# Patient Record
Sex: MALE | Race: WHITE | NOT HISPANIC OR LATINO | Employment: OTHER | URBAN - METROPOLITAN AREA
[De-identification: names, ages, dates, MRNs, and addresses within clinical notes are randomized per-mention and may not be internally consistent; named-entity substitution may affect disease eponyms.]

---

## 2017-01-04 ENCOUNTER — TRANSCRIBE ORDERS (OUTPATIENT)
Dept: ADMINISTRATIVE | Facility: HOSPITAL | Age: 72
End: 2017-01-04

## 2017-01-04 DIAGNOSIS — R91.8 PULMONARY INFILTRATE: Primary | ICD-10-CM

## 2017-01-06 ENCOUNTER — OFFICE VISIT (OUTPATIENT)
Dept: ORTHOPEDIC SURGERY | Facility: CLINIC | Age: 72
End: 2017-01-06

## 2017-01-06 VITALS — BODY MASS INDEX: 30.96 KG/M2 | WEIGHT: 209 LBS | HEIGHT: 69 IN | TEMPERATURE: 98.2 F

## 2017-01-06 DIAGNOSIS — G89.29 CHRONIC RIGHT SHOULDER PAIN: Primary | ICD-10-CM

## 2017-01-06 DIAGNOSIS — M25.511 CHRONIC RIGHT SHOULDER PAIN: Primary | ICD-10-CM

## 2017-01-06 PROCEDURE — 73030 X-RAY EXAM OF SHOULDER: CPT | Performed by: ORTHOPAEDIC SURGERY

## 2017-01-06 PROCEDURE — 73010 X-RAY EXAM OF SHOULDER BLADE: CPT | Performed by: ORTHOPAEDIC SURGERY

## 2017-01-06 PROCEDURE — 20610 DRAIN/INJ JOINT/BURSA W/O US: CPT | Performed by: ORTHOPAEDIC SURGERY

## 2017-01-06 PROCEDURE — 99204 OFFICE O/P NEW MOD 45 MIN: CPT | Performed by: ORTHOPAEDIC SURGERY

## 2017-01-06 RX ORDER — PANTOPRAZOLE SODIUM 40 MG/1
40 GRANULE, DELAYED RELEASE ORAL DAILY
COMMUNITY
End: 2019-08-05

## 2017-01-06 RX ORDER — QUETIAPINE FUMARATE 50 MG/1
50 TABLET, FILM COATED ORAL NIGHTLY
COMMUNITY
End: 2019-11-05 | Stop reason: SDUPTHER

## 2017-01-06 RX ORDER — MELOXICAM 15 MG/1
15 TABLET ORAL DAILY
Status: ON HOLD | COMMUNITY
End: 2019-04-17

## 2017-01-06 RX ADMIN — METHYLPREDNISOLONE ACETATE 160 MG: 80 INJECTION, SUSPENSION INTRA-ARTICULAR; INTRALESIONAL; INTRAMUSCULAR; SOFT TISSUE at 13:04

## 2017-01-06 RX ADMIN — LIDOCAINE HYDROCHLORIDE 2 ML: 10 INJECTION, SOLUTION INFILTRATION; PERINEURAL at 13:04

## 2017-01-06 RX ADMIN — BUPIVACAINE HYDROCHLORIDE 2 ML: 5 INJECTION, SOLUTION PERINEURAL at 13:04

## 2017-01-06 NOTE — MR AVS SNAPSHOT
Godfrey Shah   1/6/2017 8:45 AM   Office Visit    Dept Phone:  [phone]   Encounter #:  [id number]    Provider:  Cipriano Meng MD   Department:  [facility]                Your Full Care Plan              Your Updated Medication List          This list is accurate as of: 1/6/17 10:18 AM.  Always use your most recent med list.                cetirizine 10 MG tablet   Commonly known as:  zyrTEC       meloxicam 15 MG tablet   Commonly known as:  MOBIC       mirtazapine 7.5 MG tablet   Commonly known as:  REMERON       PROTONIX 40 MG pack packet   Generic drug:  pantoprazole       QUEtiapine 50 MG tablet   Commonly known as:  SEROquel               We Performed the Following     XR Scapula Right     XR Shoulder 2+ View Right       You Were Diagnosed With        Codes Comments    Chronic right shoulder pain    -  Primary ICD-10-CM: M25.511, G89.29  ICD-9-CM: 719.41, 338.29       Instructions     None    Patient Instructions History      Upcoming Appointments     Visit Type Date Time Department    NEW PATIENT 1/6/2017  8:45 AM [facility]    CT Y[facility]ZA CHEST W CONTRAST 4/4/2017  7:00 AM  [facility] CT      MyChart Signup     Our records indicate that your [facility] [employer] account has been deactivated. If you would like to reactivate your account, please email [email or web address] or call [phone] to talk to our [employer] staff.             Other Info from Your Visit           Your Appointments     Apr 04, 2017  7:00 AM EDT   CT yaritza chest w contrast with YARITZA CT 3   [facility]    [address] [facility] [address] [phone]           Bring current list of meds Please arrive 15 minutes prior to exam              Allergies     No Known Allergies      Reason for Visit     Right Shoulder - Establish Care           Vital Signs     Temperature Height Weight Body Mass Index Smoking Status    "   98.2 °F (36.8 °C) (Temporal Artery ) 68.5\" (174 cm) 209 lb (94.8 kg) 31.32 kg/m2 Former Smoker       Problems and Diagnoses Noted     Chronic right shoulder pain    -  Primary        "

## 2017-01-07 RX ORDER — METHYLPREDNISOLONE ACETATE 80 MG/ML
160 INJECTION, SUSPENSION INTRA-ARTICULAR; INTRALESIONAL; INTRAMUSCULAR; SOFT TISSUE
Status: COMPLETED | OUTPATIENT
Start: 2017-01-06 | End: 2017-01-06

## 2017-01-07 RX ORDER — BUPIVACAINE HYDROCHLORIDE 5 MG/ML
2 INJECTION, SOLUTION PERINEURAL
Status: COMPLETED | OUTPATIENT
Start: 2017-01-06 | End: 2017-01-06

## 2017-01-07 RX ORDER — LIDOCAINE HYDROCHLORIDE 10 MG/ML
2 INJECTION, SOLUTION INFILTRATION; PERINEURAL
Status: COMPLETED | OUTPATIENT
Start: 2017-01-06 | End: 2017-01-06

## 2017-01-07 NOTE — PROGRESS NOTES
"  Patient: Godfrey Shah    YOB: 1945    Medical Record Number: [id number]    Chief Complaints:   Right shoulder pain    History of Present Illness:     71 y.o. male patient who presents with right shoulder pain and weakness.  He tells me that this has been and off and on issue for approximately 1 year. It has gotten especially bad over the past 2 months.  He cannot recall any specific inciting event or factor.  Pain is mostly lateral.  He does experience a lot of night pain.  He has been taking Mobic and it seems to help somewhat.  He describes the pain as varying in severity from mild to severe depending upon his activity level.  Pain is constant and aching.  He is right-hand dominant and this is been a hardship for him.  He rates his current pain as 5 out of 10 in severity. Denies any shooting pain down the arm, distal weakness, numbness, or paresthesias.    Allergies: No Known Allergies    Home Medications:    Current Outpatient Prescriptions:   •  meloxicam (MOBIC) 15 MG tablet, Take 15 mg by mouth Daily., Disp: , Rfl:   •  QUEtiapine (SEROquel) 50 MG tablet, Take 50 mg by mouth Every Night., Disp: , Rfl:   •  cetirizine (ZyrTEC) 10 MG tablet, Take 10 mg by mouth daily., Disp: , Rfl:   •  mirtazapine (REMERON) 7.5 MG tablet, Take 7.5 mg by mouth every night., Disp: , Rfl:   •  pantoprazole (PROTONIX) 40 MG pack packet, Take  by mouth., Disp: , Rfl:     Past Medical History   Diagnosis Date   • Anxiety    • GERD (gastroesophageal reflux disease)        Past Surgical History   Procedure Laterality Date   • Penis surgery       Penile implant       Social History     Occupational History   • Not on file.     Social History Main Topics   • Smoking status: Former Smoker   • Smokeless tobacco: Not on file      Comment: Quit 2000   • Alcohol use Yes      Comment: \"I was drinking about a bottle a day for the last month\".    • Drug use: Defer   • Sexual activity: Defer      Social History     Social " "History Narrative       No family history on file.    Review of Systems:      Constitutional: Denies fever, shaking or chills   Eyes: Denies change in visual acuity   HEENT: Denies nasal congestion or sore throat   Respiratory: Denies cough or shortness of breath   Cardiovascular: Denies chest pain or edema  Endocrine: Denies tremors, palpitations, intolerance of heat or cold, polyuria, polydipsia.  GI: Denies abdominal pain, nausea, vomiting, bloody stools or diarrhea  : Denies frequency, urgency, incontinence, retention, or nocturia.  Musculoskeletal: Denies numbness tingling or loss of motor function except as above  Integument: Denies rash, lesion or ulceration   Neurologic: Denies headache or focal weakness, deficits  Heme: Denies epistaxis, spontaneous or excessive bleeding, epistaxis, hematuria, melena, fatigue, enlarged or tender lymph nodes.      All other pertinent positives and negatives as noted above in HPI.    Physical Exam: 71 y.o. male  Vitals:    01/06/17 0948   Temp: 98.2 °F (36.8 °C)   TempSrc: Temporal Artery    Weight: 209 lb (94.8 kg)   Height: 68.5\" (174 cm)       General:  Patient is awake and alert.  Appears in no acute distress or discomfort.    Psych:  Affect and demeanor are appropriate.    Eyes:  Conjunctiva and sclera appear grossly normal.  Eyes track well and EOM seem to be intact.    Ears:  No gross abnormalities.  Hearing adequate for the exam.    Cardiovascular:  Regular rate and rhythm.    Lungs:  Good chest expansion.  Breathing unlabored.    Lymph:  No palpable adenopathy about neck or axilla.    Neck:  Supple.  Normal ROM.  Negative Spurling's for shoulder or arm pain.    Right upper extremity:  Skin benign and intact without evidence for swelling, masses or atrophy.  No palpable masses.  No focal areas of exquisite tenderness.  Full active ROM.  No evident instability or apprehension.  Positive Neer and Rowan impingement maneuvers.  Negative Speeds, Yergason's and active " compression maneuvers.  Pain and weakness with resistive testing of elevation in scapular plane and external rotation.  Negative Hornblower's and ER lag sign.  Good strength in wrist and hand.  Intact sensation in arm, hand.  Palpable radial pulse with brisk cap refill.         Radiology:   AP, scapular Y, and axillary views of the right shoulder are ordered by myself and reviewed to evaluate the patient's complaint.  No comparison films are immediately available.  The x-rays show no obvious acute abnormalities, lesions, masses, or other concerning findings.  Type II acromion.  Moderate acromioclavicular osteoarthritis.The acromiohumeral interval is normal.  Glenoid version appears normal as well.    Assessment/Plan:   Right rotator cuff tear versus tendinopathy    Discussed options in detail including conservative versus surgical options. I have recommended that we start with a conservative approach. With regards to conservative options, we discussed appropriate activity modifications, icing as needed, anti-inflammatories, physical therapy, and injections.  Patient has acknowledged understanding of this information and stated that he would like to try an injection.  The risk benefits and alternatives were thoroughly discussed.  He consented to proceed as described below.  Going forward, I will release him to follow-up as needed.  If his pain persists and/or recurs, I told him I'll be happy to see him back at any point.    Large Joint Arthrocentesis  Date/Time: 1/6/2017 1:04 PM  Consent given by: patient  Site marked: site marked  Timeout: Immediately prior to procedure a time out was called to verify the correct patient, procedure, equipment, support staff and site/side marked as required   Supporting Documentation  Indications: pain and joint swelling   Procedure Details  Location: shoulder - R subacromial bursa  Preparation: Patient was prepped and draped in the usual sterile fashion  Needle size: 25 G  Approach:  posterior  Medications administered: 2 mL lidocaine 1 %; 160 mg methylPREDNISolone acetate 80 MG/ML; 2 mL bupivacaine 0.5 %  Patient tolerance: patient tolerated the procedure well with no immediate complications            Cirpiano Meng MD    01/06/2017    CC to Godfrey Jackson MD

## 2017-01-27 ENCOUNTER — TELEPHONE (OUTPATIENT)
Dept: ORTHOPEDIC SURGERY | Facility: CLINIC | Age: 72
End: 2017-01-27

## 2017-01-27 DIAGNOSIS — G89.29 CHRONIC RIGHT SHOULDER PAIN: Primary | ICD-10-CM

## 2017-01-27 DIAGNOSIS — M25.511 CHRONIC RIGHT SHOULDER PAIN: Primary | ICD-10-CM

## 2017-04-04 ENCOUNTER — HOSPITAL ENCOUNTER (OUTPATIENT)
Dept: CT IMAGING | Facility: HOSPITAL | Age: 72
Discharge: HOME | End: 2017-04-04
Attending: INTERNAL MEDICINE | Admitting: INTERNAL MEDICINE

## 2017-04-04 DIAGNOSIS — R91.8 PULMONARY INFILTRATE: ICD-10-CM

## 2017-04-04 LAB — CREAT BLDA-MCNC: 0.9 MG/DL (ref 0.6–1.3)

## 2017-04-04 PROCEDURE — 0 IOPAMIDOL 61 % SOLUTION: Performed by: INTERNAL MEDICINE

## 2017-04-04 PROCEDURE — 82565 ASSAY OF CREATININE: CPT

## 2017-04-04 PROCEDURE — 71260 CT THORAX DX C+: CPT

## 2017-04-04 RX ADMIN — IOPAMIDOL 75 ML: 612 INJECTION, SOLUTION INTRAVENOUS at 06:50

## 2017-04-13 ENCOUNTER — TRANSCRIBE ORDERS (OUTPATIENT)
Dept: ADMINISTRATIVE | Facility: HOSPITAL | Age: 72
End: 2017-04-13

## 2017-04-13 DIAGNOSIS — R93.89 INFILTRATE NOTED ON IMAGING STUDY: Primary | ICD-10-CM

## 2017-04-21 ENCOUNTER — TRANSCRIBE ORDERS (OUTPATIENT)
Dept: ADMINISTRATIVE | Facility: HOSPITAL | Age: 72
End: 2017-04-21

## 2017-04-21 DIAGNOSIS — R22.31 MASS OF UPPER EXTREMITY, RIGHT: Primary | ICD-10-CM

## 2017-05-01 ENCOUNTER — HOSPITAL ENCOUNTER (OUTPATIENT)
Dept: MRI IMAGING | Facility: HOSPITAL | Age: 72
Discharge: HOME | End: 2017-05-01
Admitting: FAMILY MEDICINE

## 2017-05-01 DIAGNOSIS — R22.31 MASS OF UPPER EXTREMITY, RIGHT: ICD-10-CM

## 2017-05-01 PROCEDURE — 73219 MRI UPPER EXTREMITY W/DYE: CPT

## 2017-05-01 PROCEDURE — A9577 INJ MULTIHANCE: HCPCS | Performed by: FAMILY MEDICINE

## 2017-05-01 PROCEDURE — 0 GADOBENATE DIMEGLUMINE 529 MG/ML SOLUTION: Performed by: FAMILY MEDICINE

## 2017-05-01 RX ADMIN — GADOBENATE DIMEGLUMINE 20 ML: 529 INJECTION, SOLUTION INTRAVENOUS at 15:15

## 2017-05-19 ENCOUNTER — OFFICE VISIT (OUTPATIENT)
Dept: ORTHOPEDIC SURGERY | Facility: CLINIC | Age: 72
End: 2017-05-19

## 2017-05-19 DIAGNOSIS — G89.29 CHRONIC RIGHT SHOULDER PAIN: Primary | ICD-10-CM

## 2017-05-19 DIAGNOSIS — M25.511 CHRONIC RIGHT SHOULDER PAIN: Primary | ICD-10-CM

## 2017-05-19 PROCEDURE — 99212 OFFICE O/P EST SF 10 MIN: CPT | Performed by: ORTHOPAEDIC SURGERY

## 2017-05-19 RX ORDER — ALBUTEROL SULFATE 90 UG/1
AEROSOL, METERED RESPIRATORY (INHALATION)
Refills: 3 | COMMUNITY
Start: 2017-04-12 | End: 2022-09-15 | Stop reason: SDUPTHER

## 2017-10-04 ENCOUNTER — HOSPITAL ENCOUNTER (OUTPATIENT)
Dept: CT IMAGING | Facility: HOSPITAL | Age: 72
Discharge: HOME | End: 2017-10-04
Attending: INTERNAL MEDICINE | Admitting: INTERNAL MEDICINE

## 2017-10-04 DIAGNOSIS — R93.89 INFILTRATE NOTED ON IMAGING STUDY: ICD-10-CM

## 2017-10-04 PROCEDURE — 71250 CT THORAX DX C-: CPT

## 2017-10-12 ENCOUNTER — TRANSCRIBE ORDERS (OUTPATIENT)
Dept: ADMINISTRATIVE | Facility: HOSPITAL | Age: 72
End: 2017-10-12

## 2017-10-12 DIAGNOSIS — R91.8 PULMONARY INFILTRATE: Primary | ICD-10-CM

## 2018-02-27 ENCOUNTER — TRANSCRIBE ORDERS (OUTPATIENT)
Dept: ADMINISTRATIVE | Facility: HOSPITAL | Age: 73
End: 2018-02-27

## 2018-02-27 DIAGNOSIS — N28.89 KIDNEY MASS: Primary | ICD-10-CM

## 2018-06-12 ENCOUNTER — HOSPITAL ENCOUNTER (OUTPATIENT)
Dept: CT IMAGING | Facility: HOSPITAL | Age: 73
Discharge: HOME | End: 2018-06-12
Attending: UROLOGY | Admitting: UROLOGY

## 2018-06-12 DIAGNOSIS — N28.89 KIDNEY MASS: ICD-10-CM

## 2018-06-12 LAB — CREAT BLDA-MCNC: 1 MG/DL (ref 0.6–1.3)

## 2018-06-12 PROCEDURE — 74178 CT ABD&PLV WO CNTR FLWD CNTR: CPT

## 2018-06-12 PROCEDURE — 84154 ASSAY OF PSA FREE: CPT | Performed by: UROLOGY

## 2018-06-12 PROCEDURE — 82565 ASSAY OF CREATININE: CPT

## 2018-06-12 PROCEDURE — 25010000002 IOPAMIDOL 61 % SOLUTION: Performed by: UROLOGY

## 2018-06-12 PROCEDURE — 84153 ASSAY OF PSA TOTAL: CPT | Performed by: UROLOGY

## 2018-06-12 RX ADMIN — IOPAMIDOL 85 ML: 612 INJECTION, SOLUTION INTRAVENOUS at 08:35

## 2018-06-13 LAB
PSA FREE MFR SERPL: 8.3 %
PSA FREE SERPL-MCNC: 0.1 NG/ML
PSA SERPL-MCNC: 1.2 NG/ML (ref 0–4)

## 2018-10-10 ENCOUNTER — HOSPITAL ENCOUNTER (OUTPATIENT)
Dept: CT IMAGING | Facility: HOSPITAL | Age: 73
Discharge: HOME | End: 2018-10-10
Attending: INTERNAL MEDICINE | Admitting: INTERNAL MEDICINE

## 2018-10-10 DIAGNOSIS — R91.8 PULMONARY INFILTRATE: ICD-10-CM

## 2018-10-10 PROCEDURE — 71250 CT THORAX DX C-: CPT

## 2019-04-15 ENCOUNTER — HOSPITAL ENCOUNTER (INPATIENT)
Facility: HOSPITAL | Age: 74
LOS: 2 days | Discharge: HOME | End: 2019-04-20
Attending: INTERNAL MEDICINE | Admitting: INTERNAL MEDICINE

## 2019-04-15 DIAGNOSIS — R00.1 SINUS BRADYCARDIA: ICD-10-CM

## 2019-04-15 DIAGNOSIS — R00.1 BRADYCARDIA, SINUS: ICD-10-CM

## 2019-04-15 DIAGNOSIS — I44.30 AV BLOCK: Primary | ICD-10-CM

## 2019-04-15 DIAGNOSIS — G47.33 OSA (OBSTRUCTIVE SLEEP APNEA): ICD-10-CM

## 2019-04-15 DIAGNOSIS — R00.1 SYMPTOMATIC BRADYCARDIA: ICD-10-CM

## 2019-04-15 PROBLEM — G47.30 SLEEP-DISORDERED BREATHING: Status: ACTIVE | Noted: 2019-04-15

## 2019-04-15 LAB
ALBUMIN SERPL-MCNC: 3.8 G/DL (ref 3.5–5.2)
ALBUMIN/GLOB SERPL: 1.4 G/DL
ALP SERPL-CCNC: 83 U/L (ref 39–117)
ALT SERPL W P-5'-P-CCNC: 11 U/L (ref 1–41)
ANION GAP SERPL CALCULATED.3IONS-SCNC: 12.4 MMOL/L
AST SERPL-CCNC: 15 U/L (ref 1–40)
BILIRUB SERPL-MCNC: 1 MG/DL (ref 0.2–1.2)
BUN BLD-MCNC: 11 MG/DL (ref 8–23)
BUN/CREAT SERPL: 12.1 (ref 7–25)
CALCIUM SPEC-SCNC: 8.5 MG/DL (ref 8.6–10.5)
CHLORIDE SERPL-SCNC: 105 MMOL/L (ref 98–107)
CK SERPL-CCNC: 77 U/L (ref 20–200)
CO2 SERPL-SCNC: 25.6 MMOL/L (ref 22–29)
CREAT BLD-MCNC: 0.91 MG/DL (ref 0.76–1.27)
GFR SERPL CREATININE-BSD FRML MDRD: 82 ML/MIN/1.73
GLOBULIN UR ELPH-MCNC: 2.8 GM/DL
GLUCOSE BLD-MCNC: 97 MG/DL (ref 65–99)
MAGNESIUM SERPL-MCNC: 2 MG/DL (ref 1.6–2.4)
POTASSIUM BLD-SCNC: 3.8 MMOL/L (ref 3.5–5.2)
PROT SERPL-MCNC: 6.6 G/DL (ref 6–8.5)
SODIUM BLD-SCNC: 143 MMOL/L (ref 136–145)
TROPONIN T SERPL-MCNC: <0.01 NG/ML (ref 0–0.03)

## 2019-04-15 PROCEDURE — 83735 ASSAY OF MAGNESIUM: CPT | Performed by: INTERNAL MEDICINE

## 2019-04-15 PROCEDURE — 85027 COMPLETE CBC AUTOMATED: CPT | Performed by: INTERNAL MEDICINE

## 2019-04-15 PROCEDURE — 85610 PROTHROMBIN TIME: CPT | Performed by: INTERNAL MEDICINE

## 2019-04-15 PROCEDURE — 84443 ASSAY THYROID STIM HORMONE: CPT | Performed by: INTERNAL MEDICINE

## 2019-04-15 PROCEDURE — 83036 HEMOGLOBIN GLYCOSYLATED A1C: CPT | Performed by: INTERNAL MEDICINE

## 2019-04-15 PROCEDURE — G0378 HOSPITAL OBSERVATION PER HR: HCPCS

## 2019-04-15 PROCEDURE — 85730 THROMBOPLASTIN TIME PARTIAL: CPT | Performed by: INTERNAL MEDICINE

## 2019-04-15 PROCEDURE — 84484 ASSAY OF TROPONIN QUANT: CPT | Performed by: INTERNAL MEDICINE

## 2019-04-15 PROCEDURE — 93010 ELECTROCARDIOGRAM REPORT: CPT | Performed by: INTERNAL MEDICINE

## 2019-04-15 PROCEDURE — 82550 ASSAY OF CK (CPK): CPT | Performed by: INTERNAL MEDICINE

## 2019-04-15 PROCEDURE — 93005 ELECTROCARDIOGRAM TRACING: CPT | Performed by: INTERNAL MEDICINE

## 2019-04-15 PROCEDURE — 80053 COMPREHEN METABOLIC PANEL: CPT | Performed by: INTERNAL MEDICINE

## 2019-04-15 RX ORDER — CETIRIZINE HYDROCHLORIDE 10 MG/1
10 TABLET ORAL DAILY
Status: DISCONTINUED | OUTPATIENT
Start: 2019-04-16 | End: 2019-04-20 | Stop reason: HOSPADM

## 2019-04-15 RX ORDER — NITROGLYCERIN 0.4 MG/1
0.4 TABLET SUBLINGUAL
Status: DISCONTINUED | OUTPATIENT
Start: 2019-04-15 | End: 2019-04-20 | Stop reason: HOSPADM

## 2019-04-15 RX ORDER — ONDANSETRON 2 MG/ML
4 INJECTION INTRAMUSCULAR; INTRAVENOUS EVERY 6 HOURS PRN
Status: DISCONTINUED | OUTPATIENT
Start: 2019-04-15 | End: 2019-04-20 | Stop reason: HOSPADM

## 2019-04-15 RX ORDER — LOSARTAN POTASSIUM 25 MG/1
25 TABLET ORAL DAILY
COMMUNITY
End: 2019-08-06 | Stop reason: SDUPTHER

## 2019-04-15 RX ORDER — QUETIAPINE FUMARATE 50 MG/1
50 TABLET, FILM COATED ORAL NIGHTLY
Status: DISCONTINUED | OUTPATIENT
Start: 2019-04-15 | End: 2019-04-20 | Stop reason: HOSPADM

## 2019-04-15 RX ORDER — ACETAMINOPHEN 325 MG/1
650 TABLET ORAL EVERY 6 HOURS PRN
Status: DISCONTINUED | OUTPATIENT
Start: 2019-04-15 | End: 2019-04-20 | Stop reason: HOSPADM

## 2019-04-15 RX ORDER — PANTOPRAZOLE SODIUM 40 MG/1
40 TABLET, DELAYED RELEASE ORAL
Status: DISCONTINUED | OUTPATIENT
Start: 2019-04-16 | End: 2019-04-17 | Stop reason: SDUPTHER

## 2019-04-15 RX ADMIN — QUETIAPINE FUMARATE 25 MG: 50 TABLET, FILM COATED ORAL at 23:46

## 2019-04-16 ENCOUNTER — APPOINTMENT (OUTPATIENT)
Dept: GENERAL RADIOLOGY | Facility: HOSPITAL | Age: 74
End: 2019-04-16

## 2019-04-16 ENCOUNTER — APPOINTMENT (OUTPATIENT)
Dept: CARDIOLOGY | Facility: HOSPITAL | Age: 74
End: 2019-04-16

## 2019-04-16 PROBLEM — R00.1 SYMPTOMATIC BRADYCARDIA: Status: ACTIVE | Noted: 2019-04-16

## 2019-04-16 LAB
ANION GAP SERPL CALCULATED.3IONS-SCNC: 13.8 MMOL/L
APTT PPP: 29.8 SECONDS (ref 22.7–35.4)
BH CV STRESS BP STAGE 1: NORMAL
BH CV STRESS BP STAGE 2: NORMAL
BH CV STRESS BP STAGE 3: NORMAL
BH CV STRESS DURATION MIN STAGE 1: 3
BH CV STRESS DURATION MIN STAGE 2: 3
BH CV STRESS DURATION MIN STAGE 3: 0
BH CV STRESS DURATION SEC STAGE 1: 0
BH CV STRESS DURATION SEC STAGE 2: 0
BH CV STRESS DURATION SEC STAGE 3: 5
BH CV STRESS GRADE STAGE 1: 10
BH CV STRESS GRADE STAGE 2: 12
BH CV STRESS GRADE STAGE 3: 14
BH CV STRESS HR STAGE 1: 101
BH CV STRESS HR STAGE 2: 124
BH CV STRESS HR STAGE 3: 125
BH CV STRESS METS STAGE 1: 5
BH CV STRESS METS STAGE 2: 7.5
BH CV STRESS METS STAGE 3: 10
BH CV STRESS PROTOCOL 1: NORMAL
BH CV STRESS RECOVERY BP: NORMAL MMHG
BH CV STRESS RECOVERY HR: 74 BPM
BH CV STRESS SPEED STAGE 1: 1.7
BH CV STRESS SPEED STAGE 2: 2.5
BH CV STRESS SPEED STAGE 3: 3.4
BH CV STRESS STAGE 1: 1
BH CV STRESS STAGE 2: 2
BH CV STRESS STAGE 3: 3
BUN BLD-MCNC: 12 MG/DL (ref 8–23)
BUN/CREAT SERPL: 12.2 (ref 7–25)
CALCIUM SPEC-SCNC: 8.6 MG/DL (ref 8.6–10.5)
CHLORIDE SERPL-SCNC: 104 MMOL/L (ref 98–107)
CHOLEST SERPL-MCNC: 154 MG/DL (ref 0–200)
CK SERPL-CCNC: 65 U/L (ref 20–200)
CO2 SERPL-SCNC: 24.2 MMOL/L (ref 22–29)
CREAT BLD-MCNC: 0.98 MG/DL (ref 0.76–1.27)
DEPRECATED RDW RBC AUTO: 43.9 FL (ref 37–54)
ERYTHROCYTE [DISTWIDTH] IN BLOOD BY AUTOMATED COUNT: 12.2 % (ref 12.3–15.4)
GFR SERPL CREATININE-BSD FRML MDRD: 75 ML/MIN/1.73
GLUCOSE BLD-MCNC: 104 MG/DL (ref 65–99)
HBA1C MFR BLD: 5.64 % (ref 4.8–5.6)
HCT VFR BLD AUTO: 46.6 % (ref 37.5–51)
HDLC SERPL-MCNC: 30 MG/DL (ref 40–60)
HGB BLD-MCNC: 15.4 G/DL (ref 13–17.7)
INR PPP: 1.04 (ref 0.9–1.1)
LDLC SERPL CALC-MCNC: 94 MG/DL (ref 0–100)
LDLC/HDLC SERPL: 3.15 {RATIO}
MAXIMAL PREDICTED HEART RATE: 147 BPM
MCH RBC QN AUTO: 32.2 PG (ref 26.6–33)
MCHC RBC AUTO-ENTMCNC: 33 G/DL (ref 31.5–35.7)
MCV RBC AUTO: 97.5 FL (ref 79–97)
PERCENT MAX PREDICTED HR: 86.39 %
PLATELET # BLD AUTO: 142 10*3/MM3 (ref 140–450)
PMV BLD AUTO: 10.5 FL (ref 6–12)
POTASSIUM BLD-SCNC: 3.8 MMOL/L (ref 3.5–5.2)
PROTHROMBIN TIME: 13.3 SECONDS (ref 11.7–14.2)
RBC # BLD AUTO: 4.78 10*6/MM3 (ref 4.14–5.8)
SODIUM BLD-SCNC: 142 MMOL/L (ref 136–145)
STRESS BASELINE BP: NORMAL MMHG
STRESS BASELINE HR: 63 BPM
STRESS PERCENT HR: 102 %
STRESS POST ESTIMATED WORKLOAD: 7.2 METS
STRESS POST EXERCISE DUR MIN: 6 MIN
STRESS POST EXERCISE DUR SEC: 5 SEC
STRESS POST PEAK BP: NORMAL MMHG
STRESS POST PEAK HR: 127 BPM
STRESS TARGET HR: 125 BPM
TRIGL SERPL-MCNC: 148 MG/DL (ref 0–150)
TROPONIN T SERPL-MCNC: <0.01 NG/ML (ref 0–0.03)
TSH SERPL DL<=0.05 MIU/L-ACNC: 1.03 MIU/ML (ref 0.27–4.2)
VLDLC SERPL-MCNC: 29.6 MG/DL (ref 5–40)
WBC NRBC COR # BLD: 5.24 10*3/MM3 (ref 3.4–10.8)

## 2019-04-16 PROCEDURE — G0378 HOSPITAL OBSERVATION PER HR: HCPCS

## 2019-04-16 PROCEDURE — 94640 AIRWAY INHALATION TREATMENT: CPT

## 2019-04-16 PROCEDURE — 94762 N-INVAS EAR/PLS OXIMTRY CONT: CPT

## 2019-04-16 PROCEDURE — 84484 ASSAY OF TROPONIN QUANT: CPT | Performed by: INTERNAL MEDICINE

## 2019-04-16 PROCEDURE — 94799 UNLISTED PULMONARY SVC/PX: CPT

## 2019-04-16 PROCEDURE — 80061 LIPID PANEL: CPT | Performed by: INTERNAL MEDICINE

## 2019-04-16 PROCEDURE — 71046 X-RAY EXAM CHEST 2 VIEWS: CPT

## 2019-04-16 PROCEDURE — 93010 ELECTROCARDIOGRAM REPORT: CPT | Performed by: INTERNAL MEDICINE

## 2019-04-16 PROCEDURE — 93017 CV STRESS TEST TRACING ONLY: CPT

## 2019-04-16 PROCEDURE — 93005 ELECTROCARDIOGRAM TRACING: CPT | Performed by: INTERNAL MEDICINE

## 2019-04-16 PROCEDURE — 82550 ASSAY OF CK (CPK): CPT | Performed by: INTERNAL MEDICINE

## 2019-04-16 PROCEDURE — 80048 BASIC METABOLIC PNL TOTAL CA: CPT | Performed by: INTERNAL MEDICINE

## 2019-04-16 RX ORDER — IPRATROPIUM BROMIDE AND ALBUTEROL SULFATE 2.5; .5 MG/3ML; MG/3ML
3 SOLUTION RESPIRATORY (INHALATION) EVERY 4 HOURS PRN
Status: DISCONTINUED | OUTPATIENT
Start: 2019-04-16 | End: 2019-04-20 | Stop reason: HOSPADM

## 2019-04-16 RX ORDER — ALPRAZOLAM 0.25 MG/1
0.25 TABLET ORAL 2 TIMES DAILY PRN
Status: DISCONTINUED | OUTPATIENT
Start: 2019-04-16 | End: 2019-04-20 | Stop reason: HOSPADM

## 2019-04-16 RX ORDER — ATROPINE SULFATE 0.4 MG/ML
0.4 AMPUL (ML) INJECTION ONCE AS NEEDED
Status: DISCONTINUED | OUTPATIENT
Start: 2019-04-16 | End: 2019-04-20 | Stop reason: HOSPADM

## 2019-04-16 RX ADMIN — PANTOPRAZOLE SODIUM 40 MG: 40 TABLET, DELAYED RELEASE ORAL at 06:10

## 2019-04-16 RX ADMIN — CETIRIZINE HYDROCHLORIDE 10 MG: 10 TABLET, FILM COATED ORAL at 11:04

## 2019-04-16 RX ADMIN — ALPRAZOLAM 0.25 MG: 0.25 TABLET ORAL at 14:02

## 2019-04-16 RX ADMIN — QUETIAPINE FUMARATE 50 MG: 50 TABLET, FILM COATED ORAL at 22:52

## 2019-04-16 RX ADMIN — IPRATROPIUM BROMIDE AND ALBUTEROL SULFATE 3 ML: 2.5; .5 SOLUTION RESPIRATORY (INHALATION) at 16:42

## 2019-04-16 NOTE — CONSULTS
Referring Provider: Dr. Jacinto  Reason for Consultation: Suspected sleep apnea    Patient Care Team:  Godfrey Jackson MD as PCP - General (Family Medicine)    Chief complaint:   Dizziness    History of present illness:    Subjective   This is a 73-year-old male patient, former smoker with a history of severe COPD/asthma (PFT: FEV1 of 48% on 10/24/18 and significant response to bronchodilators (+24%), air trapping and hyperinflation).    He was also diagnosed with obstructive sleep apnea about 15 years ago at [facility] sleep lab.  He was treated with CPAP which he used for about 6 months but then quit using it due to intolerance and problems with humidity and temperature.    It was until 2016 when he was again evaluated for sleep apnea by Dr. Naidu.  He underwent HST on 9/2/16 which showed no RYAN (DAVID = 1.4) but significant hypoxemia with duncan SPO2 84% and hypoxic burden of 55 minutes.    Despite the negative test, patient complains of loud snoring which does not awaken him but disturb his wife sometimes who actually sleeps in a different room.  He denies excessive daytime sleepiness.    He stated that since his original sleep study about 15 years ago, he has lost about 50 pounds.  He stopped drinking alcohol with his exercising regularly.  In addition, he underwent UPPP after the diagnosis of sleep apnea and his snoring has improved.    Concerning COPD/asthma, he uses Ventolin inhaler a few times a month only.  He does not have a maintenance inhaler.  He has difficulty breathing on moderate exertion but otherwise feels well and denies cough.    Patient presented to the hospital yesterday for  dizziness.  He stated that he was seen by his primary care physician a few months ago and was noted to have elevated blood pressure and was placed on antihypertensive.  Since then has been experiencing fluctuations in his blood pressure and heart rate.  We are consulted to reevaluate him for sleep disordered  "breathing due to his uncontrolled hypertension and arrhythmia.    Labs:  Hemoglobin 15.4; bicarb 25      Review of Systems  Constitutional: No fever or chills.   ENMT: No sinus congestion or postnasal drip  Cardiovascular: No chest pain, palpitation or legs swelling.    Respiratory: No significant dyspnea.  No cough or wheezing  Gastrointestinal: No constipation, diarrhea or abdominal pain   Neurology: No headache, weakness, numbness or dizziness.   Musculoskeletal: No joints pain, stiffness or swelling.   Psychiatry: No depression.  Genitourinary: No dysuria or frequent urination  Endo: No weight changes. No cold or warm intolerance.  Lymphatic: No swollen glands.  Integumentary: No rash.    History  Past Medical History:   Diagnosis Date   • Anxiety    • GERD (gastroesophageal reflux disease)    • Prostate cancer (CMS/[facility]) 05/2017   ,   Past Surgical History:   Procedure Laterality Date   • PENIS SURGERY      Penile implant   ,   Family History   Problem Relation Age of Onset   • No Known Problems Mother    • No Known Problems Father    ,   Social History     Tobacco Use   • Smoking status: Former Smoker   • Tobacco comment: Quit 2000   Substance Use Topics   • Alcohol use: Yes     Comment: \"I was drinking about a bottle a day for the last month\".    • Drug use: Defer   ,   Medications Prior to Admission   Medication Sig Dispense Refill Last Dose   • cetirizine (ZyrTEC) 10 MG tablet Take 10 mg by mouth daily.   4/15/2019 at Unknown time   • losartan (COZAAR) 25 MG tablet Take 25 mg by mouth Daily.      • pantoprazole (PROTONIX) 40 MG pack packet Take  by mouth.   4/15/2019 at Unknown time   • QUEtiapine (SEROquel) 50 MG tablet Take 50 mg by mouth Every Night.   Patient Taking Differently at Unknown time   • meloxicam (MOBIC) 15 MG tablet Take 15 mg by mouth Daily.   Taking   • mirtazapine (REMERON) 7.5 MG tablet Take 7.5 mg by mouth every night.   Taking   • VENTOLIN  (90 BASE) MCG/ACT inhaler INL 2 PFS PO " Q 4 H PRN  3 Taking   , Scheduled Meds:    cetirizine 10 mg Oral Daily   pantoprazole 40 mg Oral Q AM   QUEtiapine 50 mg Oral Nightly   , Continuous Infusions:    and Allergies:  Patient has no known allergies.    Objective     Vital Signs   Temp:  [97.7 °F (36.5 °C)] 97.7 °F (36.5 °C)  Heart Rate:  [56-59] 56  Resp:  [16] 16  BP: (152-154)/(91-98) 154/91    Physical Exam:  Constitutional: Not in acute distress.  Eyes: Injected conjunctiva, EOMI.  ENMT: Johnston 1.  Mallampati score 3.  Large tongue.. No oral thrush. Tonsils grade 0.  UPPP changes  Neck: Large. Trachea midline. No thyromegaly  Heart: RRR, no murmur  Lungs: Good and equal air entry bilaterally.  Non labored breathing.  Crackles on the left side extending to half of the chest.  Abdomen: Obese. Soft. No tenderness or dullness. No HSM.  Extremities: No cyanosis, clubbing or pitting edema: . Moves all extremities.  Neuro: Conscious, alert, oriented x3  Psych: Appropriate mood and affect.    Integumentary: No rash  Lymphatic: No palpable cervical or supraclavicular lymph nodes.      Assessment   1. COPD/asthma overlap, no exacerbation  2. Nocturnal hypoxemia, secondary #1 +/-sleep apnea  3. Snoring  4. 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 Overweight (BMI = 28)  6. Essential hypertension  7. Sinus bradycardia    Recommendations:  Despite the negative home sleep study, RYAN cannot be completely ruled out.  Would recommend outpatient evaluation with an lab polysomnography especially in the setting of severe obstructive lung disease.  Home sleep study does not entirely exclude the possibility of sleep apnea.  Patient is currently at moderate risk.    COPD is stable.  Continue as needed inhaler.    Will get an overnight oximetry to evaluate him again for nocturnal hypoxemia.  He may benefit from oxygen therapy.    Chest x-ray ordered by primary team and its pending.  Will evaluate later      Thank you for the  consultation.  We will follow along    Girma Arthur MD  04/16/19  1:32 AM

## 2019-04-16 NOTE — PLAN OF CARE
Problem: Patient Care Overview  Goal: Plan of Care Review  Outcome: Ongoing (interventions implemented as appropriate)   04/16/19 1721   Coping/Psychosocial   Plan of Care Reviewed With patient   Plan of Care Review   Progress no change   OTHER   Outcome Summary Patient underwent treadmill stress test today with episode of bradycardia/heart block. the plan is for left heart cath in the morning. short bursts of 2nd degree heart block with excersion (lowest rate-32). remains on RA. BPs stable. will continue to monitor.      Goal: Discharge Needs Assessment  Outcome: Ongoing (interventions implemented as appropriate)      Problem: Arrhythmia/Dysrhythmia (Symptomatic) (Adult)  Goal: Signs and Symptoms of Listed Potential Problems Will be Absent, Minimized or Managed (Arrhythmia/Dysrhythmia)  Outcome: Ongoing (interventions implemented as appropriate)

## 2019-04-16 NOTE — NURSING NOTE
Patient experiencing more frequent episodes of 2nd degree heart block. Patient is for the most part asymptomatic. JUAN Bond notified. To keep patient on strict bed rest. PRN order given for atropine if HR sustained <40. To call if patient becomes more symptomatic or if BP drops. Will continue to monitor.

## 2019-04-16 NOTE — H&P
"      Patient Care Team:  Godfrey Jackson MD as PCP - General (Family Medicine)    Chief complaint   malaise    Subjective     Pt presented at [facility] with Bradycardia with malaise during exercise on Fit Bit at home. Examined pt at [facility] at approx 1500, with wife present.  Pt of Dr. Jackson.  [place] like he was \"floating under\" during the exercise.  Present for approx 3 days during exercise. Stopped exercise on 4/14 due to symptoms.   No diaph or dyspnea or nausea.    Lost 30 lbs intentionally in past 3 months.     Recently started on meds for HTN.  Not sure of names.  First med = dizzy.  Second med never filled d/t cough years ago same med (must be ACEi). Third med Losartan - only took few pills - felt poorly.     Has early awakening and nightmares. Checked for RYAN by Dr. Evangelista keith - said negative. Uses sleeping pill.     No family history or tobacco.     Review of Systems   Pertinent items are noted in HPI    History    Social History     Socioeconomic History   • Marital status:      Spouse name: Not on file   • Number of children: Not on file   • Years of education: Not on file   • Highest education level: Not on file   Tobacco Use   • Smoking status: Former Smoker   • Tobacco comment: Quit 2000   Substance and Sexual Activity   • Alcohol use: Yes     Comment: \"I was drinking about a bottle a day for the last month\".    • Drug use: Defer   • Sexual activity: Defer      Family History   Problem Relation Age of Onset   • No Known Problems Mother    • No Known Problems Father         Objective     Vital Signs  Temp:  [97.7 °F (36.5 °C)] 97.7 °F (36.5 °C)  Heart Rate:  [56-59] 56  Resp:  [16] 16  BP: (152-154)/(91-98) 154/91    Physical Exam:      General Appearance:    Alert, cooperative, in no acute distress   Head:    Normocephalic, without obvious abnormality, atraumatic   Eyes:            Lids and lashes normal, conjunctivae and sclerae normal, no   icterus, no pallor, corneas clear, PERRLA   Ears:    Ears " appear intact with no abnormalities noted   Throat:   No oral lesions, no thrush, oral mucosa moist   Neck:   No adenopathy, supple, trachea midline, no thyromegaly, no   carotid bruit, no JVD   Back:     No kyphosis present, no scoliosis present, no skin lesions,      erythema or scars, no tenderness to percussion or                   palpation,   range of motion normal   Lungs:     Dullness at L base auscultation,respirations regular, even and                  unlabored    Heart:    Regular rhythm and normal rate, normal S1 and S2, no            murmur, no gallop, no rub, no click   Chest Wall:    No abnormalities observed   Abdomen:     Normal bowel sounds, no masses, no organomegaly, soft        non-tender, non-distended, no guarding, no rebound                tenderness   Rectal:     Deferred   Extremities:   Moves all extremities well, no edema, no cyanosis, no             redness   Pulses:   Pulses palpable and equal bilaterally   Skin:   No bleeding, bruising or rash   Lymph nodes:   No palpable adenopathy   Neurologic:   Cranial nerves 2 - 12 grossly intact, sensation intact, DTR       present and equal bilaterally     Results Review:    I reviewed the patient's new clinical results.  Results from last 7 days   Lab Units 04/15/19  2302   POTASSIUM mmol/L 3.8   CREATININE mg/dL 0.91   BILIRUBIN mg/dL 1.0   MAGNESIUM mg/dL 2.0           Lab Results   Lab Value Date/Time    TROPONINT <0.010 04/15/2019 2302     No results found for: CHOL  No results found for: HDL  No results found for: LDL  No results found for: TRIG  No components found for: CHOLHDL  No results found for: TSH  No results found for: INR, PTT     Echo EF Estimated  No results found for: ECHOEFEST      Assessment/Plan       Bradycardia, sinus    Sleep-disordered breathing      Bradycardia with malaise during exercise on Fit Bit at home. Reliable observer.  ECG = RBBB and 1 AVB.    Will check for RYAN again overnight.  Check thyroid. Dr. Evangelista michael.  Stress ECG.  Doubt ischemia, but possible.  Want to see if pt develops advanced HB during exercise - would be an ischemic response.  On no neg chronotropic meds. Serious about exercise and 30 lb wt loss in 3 mos, and symptoms have stopped his exercise.     PE = L base dull.  Check CXR.     Note mild thrombocytopenia. No bleeding noted.     I discussed the patients findings and my recommendations with patient and family.     Erick Jacinto MD  04/16/19  12:38 AM    Time: 39    EMR Dragon/Transcription disclaimer:   Much of this encounter note is an electronic transcription/translation of spoken language to printed text. The electronic translation of spoken language may permit erroneous, or at times, nonsensical words or phrases to be inadvertently transcribed.  Although I have reviewed the note for such errors, some may still exist. Please contact me if needed for clarification or correction.

## 2019-04-16 NOTE — PROGRESS NOTES
Seen by Dr. Arthur earlier today.  Will see again tomorrow.  Left lower lobe crackles noted.  Get chest x-ray now instead of in the morning.  No wheezing heard.  We will plan on outpatient polysomnogram study.

## 2019-04-16 NOTE — PLAN OF CARE
Problem: Patient Care Overview  Goal: Plan of Care Review  Outcome: Ongoing (interventions implemented as appropriate)   04/16/19 0129   Coping/Psychosocial   Plan of Care Reviewed With patient   Plan of Care Review   Progress no change   OTHER   Outcome Summary Patient admitted from [facility] with systematic bradycardia. VSS. HR 50-60's. RA. Bedrest. Stress test 4/16/19

## 2019-04-16 NOTE — PROGRESS NOTES
Godfrey Shah   73 y.o.  male    LOS: 0 days   Patient Care Team:  Godfrey Jackson MD as PCP - General (Family Medicine)      Subjective   No dyspnea  Interval History:     Patient Complaints:     Review of Systems:       Medication Review:   Current Facility-Administered Medications:   •  acetaminophen (TYLENOL) tablet 650 mg, 650 mg, Oral, Q6H PRN, Erick Jacinto MD  •  cetirizine (zyrTEC) tablet 10 mg, 10 mg, Oral, Daily, Erick Jacinto MD  •  nitroglycerin (NITROSTAT) SL tablet 0.4 mg, 0.4 mg, Sublingual, Q5 Min PRN, Erick Jacinto MD  •  ondansetron (ZOFRAN) injection 4 mg, 4 mg, Intravenous, Q6H PRN, Erick Jacinto MD  •  pantoprazole (PROTONIX) EC tablet 40 mg, 40 mg, Oral, Q AM, Erick Jacinto MD, 40 mg at 04/16/19 0610  •  QUEtiapine (SEROquel) tablet 50 mg, 50 mg, Oral, Nightly, Erick Jacinto MD, 25 mg at 04/15/19 2346      Objective   Vital Sign Min/Max for last 24 hours  Temp  Min: 97.7 °F (36.5 °C)  Max: 98.5 °F (36.9 °C)   BP  Min: 115/64  Max: 154/91    Pulse  Min: 55  Max: 59     Wt Readings from Last 3 Encounters:   04/15/19 85 kg (187 lb 6 oz)   01/06/17 94.8 kg (209 lb)   09/02/16 90.3 kg (199 lb)      No intake or output data in the 24 hours ending 04/16/19 1049  Physical Exam:      General Appearance:    Well developed and well nourished in no acute distress   Head:    Normocephalic, atraumatic   Eyes:            Conjunctivae normal, non icteric, no xanthelasma   Neck:   supple, trachea midline, no thyromegaly, no carotid bruit, no jvd, no elevated cvp   Lungs:     Clear to auscultation,respirations regular, even and                  unlabored    Heart:    Regular rhythm and normal rate, normal S1 and S2,            No murmur, no gallop, no rub, no click   Chest Wall:    No abnormalities observed   Abdomen:     Normal bowel sounds, no masses, no organomegaly, soft        non-tender, non-distended, no guarding, no rebound                tenderness    Rectal:     Deferred   Extremities:   No edema. Moves all extremities well, no cyanosis, no erythema   Pulses:   Pulses palpable and equal bilaterally   Skin:   No bleeding, bruising or rash   Neurologic:   awake alert and oriented x3, speech clear and approp, no facial drooping     :    Monitor:      Results Review:         Sodium Sodium   Date Value Ref Range Status   04/16/2019 142 136 - 145 mmol/L Final   04/15/2019 143 136 - 145 mmol/L Final      Potassium Potassium   Date Value Ref Range Status   04/16/2019 3.8 3.5 - 5.2 mmol/L Final   04/15/2019 3.8 3.5 - 5.2 mmol/L Final      Chloride Chloride   Date Value Ref Range Status   04/16/2019 104 98 - 107 mmol/L Final   04/15/2019 105 98 - 107 mmol/L Final      Bicarbonate No results found for: PLASMABICARB   BUN BUN   Date Value Ref Range Status   04/16/2019 12 8 - 23 mg/dL Final   04/15/2019 11 8 - 23 mg/dL Final      Creatinine Creatinine   Date Value Ref Range Status   04/16/2019 0.98 0.76 - 1.27 mg/dL Final   04/15/2019 0.91 0.76 - 1.27 mg/dL Final      Calcium Calcium   Date Value Ref Range Status   04/16/2019 8.6 8.6 - 10.5 mg/dL Final   04/15/2019 8.5 (L) 8.6 - 10.5 mg/dL Final      Magnesium Magnesium   Date Value Ref Range Status   04/15/2019 2.0 1.6 - 2.4 mg/dL Final        Results from last 7 days   Lab Units 04/15/19  2302   WBC 10*3/mm3 5.24   HEMOGLOBIN g/dL 15.4   HEMATOCRIT % 46.6   PLATELETS 10*3/mm3 142     Lab Results   Lab Value Date/Time    TROPONINT <0.010 04/16/2019 0344    TROPONINT <0.010 04/15/2019 2302     Lab Results   Component Value Date    CHOL 154 04/16/2019     Lab Results   Component Value Date    HDL 30 (L) 04/16/2019     Lab Results   Component Value Date    LDL 94 04/16/2019     Lab Results   Component Value Date    TRIG 148 04/16/2019     No components found for: CHOLHDL  PTT   Date Value Ref Range Status   04/15/2019 29.8 22.7 - 35.4 seconds Final     No components found for: PT/INR  Lab Results   Component Value Date     HGBA1C 5.64 (H) 04/15/2019      Lab Results   Component Value Date    TSH 1.030 04/15/2019        Echo EF Estimated  No results found for: ECHOEFEST      Assessment/ Plan    Active Hospital Problems    Diagnosis POA   • Bradycardia, sinus [R00.1] Unknown   • Sleep-disordered breathing [G47.30] Unknown       Bradycardia, sinus    Sleep-disordered breathing        Bradycardia with malaise during exercise on Fit Bit at home. Reliable observer.  ECG = RBBB and 1 AVB.    Will check for RYAN again overnight.  Check thyroid. Dr. Naidu see. Stress ECG.  Doubt ischemia, but possible.  Want to see if pt develops advanced HB during exercise - would be an ischemic response.  On no neg chronotropic meds. Serious about exercise and 30 lb wt loss in 3 mos, and symptoms have stopped his exercise.      PE = L base dull.  Check CXR.      Note mild thrombocytopenia. No bleeding noted.    Second degree AVB during TM   Heart cath today  May need PPM            Emelyn Donahue MD  04/16/19  10:49 AM

## 2019-04-17 PROCEDURE — B2151ZZ FLUOROSCOPY OF LEFT HEART USING LOW OSMOLAR CONTRAST: ICD-10-PCS | Performed by: INTERNAL MEDICINE

## 2019-04-17 PROCEDURE — C1769 GUIDE WIRE: HCPCS | Performed by: INTERNAL MEDICINE

## 2019-04-17 PROCEDURE — 99153 MOD SED SAME PHYS/QHP EA: CPT | Performed by: INTERNAL MEDICINE

## 2019-04-17 PROCEDURE — 25010000002 MIDAZOLAM PER 1 MG: Performed by: INTERNAL MEDICINE

## 2019-04-17 PROCEDURE — 4A023N7 MEASUREMENT OF CARDIAC SAMPLING AND PRESSURE, LEFT HEART, PERCUTANEOUS APPROACH: ICD-10-PCS | Performed by: INTERNAL MEDICINE

## 2019-04-17 PROCEDURE — 99152 MOD SED SAME PHYS/QHP 5/>YRS: CPT | Performed by: INTERNAL MEDICINE

## 2019-04-17 PROCEDURE — 25010000002 FENTANYL CITRATE (PF) 100 MCG/2ML SOLUTION: Performed by: INTERNAL MEDICINE

## 2019-04-17 PROCEDURE — G0378 HOSPITAL OBSERVATION PER HR: HCPCS

## 2019-04-17 PROCEDURE — 93458 L HRT ARTERY/VENTRICLE ANGIO: CPT | Performed by: INTERNAL MEDICINE

## 2019-04-17 PROCEDURE — 25010000002 HEPARIN (PORCINE) PER 1000 UNITS: Performed by: INTERNAL MEDICINE

## 2019-04-17 PROCEDURE — C1894 INTRO/SHEATH, NON-LASER: HCPCS | Performed by: INTERNAL MEDICINE

## 2019-04-17 PROCEDURE — 0 IOPAMIDOL PER 1 ML: Performed by: INTERNAL MEDICINE

## 2019-04-17 PROCEDURE — B2111ZZ FLUOROSCOPY OF MULTIPLE CORONARY ARTERIES USING LOW OSMOLAR CONTRAST: ICD-10-PCS | Performed by: INTERNAL MEDICINE

## 2019-04-17 RX ORDER — CETIRIZINE HYDROCHLORIDE 10 MG/1
10 TABLET ORAL DAILY
Status: DISCONTINUED | OUTPATIENT
Start: 2019-04-17 | End: 2019-04-17 | Stop reason: SDUPTHER

## 2019-04-17 RX ORDER — MIRTAZAPINE 15 MG/1
7.5 TABLET, FILM COATED ORAL NIGHTLY
Status: DISCONTINUED | OUTPATIENT
Start: 2019-04-17 | End: 2019-04-20 | Stop reason: HOSPADM

## 2019-04-17 RX ORDER — LIDOCAINE HYDROCHLORIDE 20 MG/ML
INJECTION, SOLUTION INFILTRATION; PERINEURAL AS NEEDED
Status: DISCONTINUED | OUTPATIENT
Start: 2019-04-17 | End: 2019-04-17 | Stop reason: HOSPADM

## 2019-04-17 RX ORDER — SODIUM CHLORIDE 0.9 % (FLUSH) 0.9 %
3-10 SYRINGE (ML) INJECTION AS NEEDED
Status: DISCONTINUED | OUTPATIENT
Start: 2019-04-18 | End: 2019-04-18

## 2019-04-17 RX ORDER — ACETAMINOPHEN 325 MG/1
650 TABLET ORAL EVERY 4 HOURS PRN
Status: DISCONTINUED | OUTPATIENT
Start: 2019-04-18 | End: 2019-04-18

## 2019-04-17 RX ORDER — ZOLPIDEM TARTRATE 5 MG/1
TABLET ORAL
Qty: 2 TABLET | Refills: 0 | Status: SHIPPED | OUTPATIENT
Start: 2019-04-17 | End: 2019-08-06

## 2019-04-17 RX ORDER — SODIUM CHLORIDE 0.9 % (FLUSH) 0.9 %
3 SYRINGE (ML) INJECTION EVERY 12 HOURS SCHEDULED
Status: DISCONTINUED | OUTPATIENT
Start: 2019-04-18 | End: 2019-04-18

## 2019-04-17 RX ORDER — ONDANSETRON 2 MG/ML
4 INJECTION INTRAMUSCULAR; INTRAVENOUS EVERY 6 HOURS PRN
Status: DISCONTINUED | OUTPATIENT
Start: 2019-04-18 | End: 2019-04-18

## 2019-04-17 RX ORDER — SODIUM CHLORIDE 9 MG/ML
100 INJECTION, SOLUTION INTRAVENOUS CONTINUOUS
Status: DISCONTINUED | OUTPATIENT
Start: 2019-04-17 | End: 2019-04-17

## 2019-04-17 RX ORDER — HYDROCODONE BITARTRATE AND ACETAMINOPHEN 5; 325 MG/1; MG/1
1 TABLET ORAL EVERY 4 HOURS PRN
Status: DISCONTINUED | OUTPATIENT
Start: 2019-04-18 | End: 2019-04-18

## 2019-04-17 RX ORDER — CEFAZOLIN SODIUM 2 G/100ML
2 INJECTION, SOLUTION INTRAVENOUS
Status: DISCONTINUED | OUTPATIENT
Start: 2019-04-18 | End: 2019-04-18

## 2019-04-17 RX ORDER — ALBUTEROL SULFATE 2.5 MG/3ML
2.5 SOLUTION RESPIRATORY (INHALATION) EVERY 6 HOURS PRN
Status: DISCONTINUED | OUTPATIENT
Start: 2019-04-17 | End: 2019-04-20 | Stop reason: HOSPADM

## 2019-04-17 RX ORDER — FENTANYL CITRATE 50 UG/ML
INJECTION, SOLUTION INTRAMUSCULAR; INTRAVENOUS AS NEEDED
Status: DISCONTINUED | OUTPATIENT
Start: 2019-04-17 | End: 2019-04-17 | Stop reason: HOSPADM

## 2019-04-17 RX ORDER — LOSARTAN POTASSIUM 25 MG/1
25 TABLET ORAL DAILY
Status: DISCONTINUED | OUTPATIENT
Start: 2019-04-17 | End: 2019-04-20 | Stop reason: HOSPADM

## 2019-04-17 RX ORDER — MIDAZOLAM HYDROCHLORIDE 1 MG/ML
INJECTION INTRAMUSCULAR; INTRAVENOUS AS NEEDED
Status: DISCONTINUED | OUTPATIENT
Start: 2019-04-17 | End: 2019-04-17 | Stop reason: HOSPADM

## 2019-04-17 RX ORDER — PANTOPRAZOLE SODIUM 40 MG/1
40 TABLET, DELAYED RELEASE ORAL
Status: DISCONTINUED | OUTPATIENT
Start: 2019-04-17 | End: 2019-04-20 | Stop reason: HOSPADM

## 2019-04-17 RX ADMIN — PANTOPRAZOLE SODIUM 40 MG: 40 TABLET, DELAYED RELEASE ORAL at 06:26

## 2019-04-17 RX ADMIN — SODIUM CHLORIDE 100 ML/HR: 9 INJECTION, SOLUTION INTRAVENOUS at 11:02

## 2019-04-17 RX ADMIN — CETIRIZINE HYDROCHLORIDE 10 MG: 10 TABLET, FILM COATED ORAL at 11:54

## 2019-04-17 RX ADMIN — SODIUM CHLORIDE 100 ML/HR: 9 INJECTION, SOLUTION INTRAVENOUS at 14:34

## 2019-04-17 RX ADMIN — LOSARTAN POTASSIUM 25 MG: 25 TABLET, FILM COATED ORAL at 11:53

## 2019-04-17 NOTE — PROGRESS NOTES
"Patient Care Team:  Godfrey Jackson MD as PCP - General (Family Medicine)    Sub: No chest pain    Interval History: Stress test developed AV Block      Objective   Vital Signs  Temp:  [97.5 °F (36.4 °C)-98.4 °F (36.9 °C)] 98.3 °F (36.8 °C)  Heart Rate:  [51-64] 59  Resp:  [16-18] 16  BP: (112-140)/(59-87) 125/71    Intake/Output Summary (Last 24 hours) at 4/17/2019 1033  Last data filed at 4/17/2019 1016  Gross per 24 hour   Intake 500 ml   Output --   Net 500 ml     Flowsheet Rows      First Filed Value   Admission Height  172.7 cm (68\") Documented at 04/15/2019 2021   Admission Weight  85 kg (187 lb 6 oz) Documented at 04/15/2019 2021          Physical Exam:   General Appearance:    Alert, cooperative, in no acute distress   Lungs:     Clear to auscultation,respirations regular, even and                  unlabored    Heart:    Regular rhythm and normal rate, normal S1 and S2, no            murmur, no gallop, no rub, no click   Chest Wall:    No abnormalities observed   Abdomen:     Normal bowel sounds, no masses, no organomegaly, soft        non-tender, non-distended, no guarding, no rebound                tenderness   Extremities:   Moves all extremities well, no edema, no cyanosis, no             redness     Results Review:    Results from last 7 days   Lab Units 04/16/19  0344   SODIUM mmol/L 142   POTASSIUM mmol/L 3.8   CHLORIDE mmol/L 104   CO2 mmol/L 24.2   BUN mg/dL 12   CREATININE mg/dL 0.98   GLUCOSE mg/dL 104*   CALCIUM mg/dL 8.6     Results from last 7 days   Lab Units 04/16/19  0344 04/15/19  2302   CK TOTAL U/L 65 77   TROPONIN T ng/mL <0.010 <0.010     Results from last 7 days   Lab Units 04/15/19  2302   WBC 10*3/mm3 5.24   HEMOGLOBIN g/dL 15.4   HEMATOCRIT % 46.6   PLATELETS 10*3/mm3 142     Results from last 7 days   Lab Units 04/15/19  2302   INR  1.04   APTT seconds 29.8     Results from last 7 days   Lab Units 04/15/19  2302   MAGNESIUM mg/dL 2.0     Results from last 7 days   Lab Units " 04/16/19  0344   CHOLESTEROL mg/dL 154   TRIGLYCERIDES mg/dL 148   HDL CHOL mg/dL 30*   LDL CHOL mg/dL 94         [MAR Hold] cetirizine 10 mg Oral Daily   [MAR Hold] pantoprazole 40 mg Oral Q AM   [MAR Hold] QUEtiapine 50 mg Oral Nightly        No current facility-administered medications on file prior to encounter.      Current Outpatient Medications on File Prior to Encounter   Medication Sig Dispense Refill   • cetirizine (ZyrTEC) 10 MG tablet Take 10 mg by mouth daily.     • losartan (COZAAR) 25 MG tablet Take 25 mg by mouth Daily.     • pantoprazole (PROTONIX) 40 MG pack packet Take  by mouth.     • QUEtiapine (SEROquel) 50 MG tablet Take 50 mg by mouth Every Night.     • meloxicam (MOBIC) 15 MG tablet Take 15 mg by mouth Daily.     • mirtazapine (REMERON) 7.5 MG tablet Take 7.5 mg by mouth every night.     • VENTOLIN  (90 BASE) MCG/ACT inhaler INL 2 PFS PO Q 4 H PRN  3         I reviewed the patient's new clinical results.  I personally viewed and interpreted the patient's EKG/Telemetry data    Assessment/Plan  Patient Active Problem List   Diagnosis   • Bradycardia, sinus   • Sleep-disordered breathing   • Symptomatic bradycardia     Stress test with st segment depression and second degree AV block.     Patient also reports symptomatic episodes of brradycardia    During cardiac cath developed high grade AV block also.     Cardiac cath with calcified 80% mid LCX and 80% inferior division of OM2. With ITALIA 3 flow. LCX is a dominant vessel.     We do not suspect arterial flow related rhythm issue.     AV block is due to likely due to degenerated electrical system.     EP to evaluate for possible PPM     LCX PCI can be done electively, with atherectomy, later.     NO AV blocking agents to be given       Lalo Naranjo MD  04/17/19  10:33 AM

## 2019-04-17 NOTE — PROGRESS NOTES
[facility]  [phone]  Rajinder Naidu MD    Subjective:  LOS: 0    Patient is status post heart cath.  He also had a stress test.  He developed high-grade AV block and is pending consideration of pacemaker by EP.  He denies any new breathing problems.  He denies new cough or phlegm.  He states he sleeps fine.  He is not sure that he needs a sleep study or treatment thereof.  His chest x-ray showed mild cardiomegaly only.    Objective   Vital Signs past 24hrs    Temp range: Temp (24hrs), Av °F (36.7 °C), Min:97.5 °F (36.4 °C), Max:98.3 °F (36.8 °C)    BP range: BP: (112-143)/(59-87) 129/75  Pulse range: Heart Rate:  [51-64] 53  Resp rate range: Resp:  [16-18] 18    Device (Oxygen Therapy): room air   Oxygen range:SpO2:  [91 %-94 %] 94 %      85 kg (187 lb 6 oz); Body mass index is 28.49 kg/m².    Intake/Output Summary (Last 24 hours) at 2019 1213  Last data filed at 2019 1121  Gross per 24 hour   Intake 440 ml   Output --   Net 440 ml       Physical Exam   Constitutional: He appears well-developed.   Cardiovascular: Normal rate and regular rhythm.   No murmur heard.  Pulmonary/Chest: He has no wheezes. He has no rales.   Abdominal: Soft. Bowel sounds are normal. There is no tenderness.   Musculoskeletal: He exhibits no edema.   Neurological: He is alert.   Nursing note and vitals reviewed.    Results Review:    I have reviewed the laboratory and imaging data since the last note by [facility] physician.  My annotations are noted in assessment and plan.    Overnight oximetry on 2019 on room air shows saturation remains above 90% for the entire night.  There was no events of desaturation.  Supplemental oxygen is therefore not required.    Medication Review:  I have reviewed the current MAR.  My annotations are noted in assessment and plan.      sodium chloride 100 mL/hr Last Rate: 100 mL/hr (19 1102)     Plan   PCCM Problems  COPD without exacerbation  High-grade AV block pending  pacemaker  Coronary artery disease  Previous diagnosis obstructive sleep apnea    Plan of Treatment  No exacerbation COPD.  Chest x-ray clear.  Continue with breathing treatments as needed.    Prior diagnosis sleep apnea 15 years ago.  Recent negative sleep study.  Will arrange outpatient sleep study for confirmatory diagnosis.  This is in view of AV block and now consideration of pacemaker placement along with coronary artery disease.  Explained to patient.  Supplemental oxygen however is not required at this time.    Rajinder Naidu MD  04/17/19  12:13 PM    Part of this note may be an electronic transcription/translation of spoken language to printed text using the Dragon Dictation System.

## 2019-04-17 NOTE — CONSULTS
"Godfrey Shah   73 y.o.  male    LOS: 0 days   Patient Care Team:  Godfrey Jackson MD as PCP - General (Family Medicine)    Electrophysiology Consult Note    Subjective     Patient Complaints: Dizziness, Near Syncope    History of Present Illness:     I was asked by Dr. Srivastava to evaluate this very pleasant 74 yo white male for consideration of Device Therapy. Godfrey has a past medical history of HTN, former tobacco abuse, COPD/Asthma, RYAN (noncompliant with CPAP) who had presented originally to the ER after he had called his PCP and mentioned that he was feeling light-headed and dizzy while on the Treadmill with recorded HRs in the 30 bpm range on his FitBit - he could not get his heart rate elevated while walking. He had recently been started on some HTN meds - cannot remember - but stopped them due to dizziness. He was admitted at [facility] and underwent a stress test that showed ST segment depression and second degree AV block. He also had mentioned symptomatic episodes of bradycardia. He underwent LHC Today - during cardiac cath developed high grade AV block as well. Cardiac cath with calcified 80% mid LCX and 80% inferior division of OM2. With ITALIA 3 flow. LCX is a dominant vessel. No intervention was done. Baseline ECG shows RBBB with prolonged LA interval. He has no family history of premature CAD, SCD, nor Arrhythmias.      Review of Systems:   Negative except as stated above.    Medication Review: Reviewed      Family History   Problem Relation Age of Onset   • No Known Problems Mother    • No Known Problems Father      Social History     Socioeconomic History   • Marital status:      Spouse name: Not on file   • Number of children: Not on file   • Years of education: Not on file   • Highest education level: Not on file   Tobacco Use   • Smoking status: Former Smoker   • Tobacco comment: Quit 2000   Substance and Sexual Activity   • Alcohol use: Yes     Comment: \"I was drinking about a " "bottle a day for the last month\".    • Drug use: Defer   • Sexual activity: Defer     Objective   Past Surgical History:   Procedure Laterality Date   • CARDIAC CATHETERIZATION N/A 4/17/2019    Procedure: LEFT HEART CATH;  Surgeon: Lalo Naranjo MD;  Location:  [facility];  Service: Cardiovascular   • CARDIAC CATHETERIZATION N/A 4/17/2019    Procedure: CORONARY ANGIOGRAPHY;  Surgeon: Lalo Naranjo MD;  Location:  [facility];  Service: Cardiovascular   • PENIS SURGERY      Penile implant     Past Medical History:   Diagnosis Date   • Anxiety    • COPD (chronic obstructive pulmonary disease) (CMS/[facility])    • GERD (gastroesophageal reflux disease)    • Prostate cancer (CMS/[facility]) 05/2017       Vital Sign Min/Max for last 24 hours  Temp  Min: 97.5 °F (36.4 °C)  Max: 98.3 °F (36.8 °C)   BP  Min: 112/59  Max: 143/86    Pulse  Min: 51  Max: 67     Wt Readings from Last 3 Encounters:   04/15/19 85 kg (187 lb 6 oz)   01/06/17 94.8 kg (209 lb)   09/02/16 90.3 kg (199 lb)        Physical Exam:      General Appearance:    Alert, cooperative, in no acute distress   Head:    Normocephalic, without obvious abnormality, atraumatic   Eyes:            Conjunctivae normal, no   icterus   Neck:   No adenopathy, supple, trachea midline, no thyromegaly, no   carotid bruit, no JVD   Lungs:     Clear to auscultation,respirations regular, even and                  unlabored    Heart:    Regular rhythm and normal rate, normal S1 and S2,            No murmur, no gallop, no rub, no click   Chest Wall:    No abnormalities observed   Abdomen:     Normal bowel sounds, no masses, no organomegaly, soft        non-tender, non-distended, no guarding, no rebound                tenderness   Rectal:     Deferred   Extremities:   No edema. Moves all extremities well, no cyanosis, no erythema   Pulses:   Pulses palpable and equal bilaterally   Skin:   No bleeding, bruising or rash   Neurologic:   Cranial nerves 2 - 12 " grossly intact, sensation intact, DTR       present and equal bilaterally        Results Review:     I reviewed the patient's new clinical results.  I personally viewed and interpreted the patient's EKG/Telemetry data  Potassium   Date Value Ref Range Status   04/16/2019 3.8 3.5 - 5.2 mmol/L Final     Creatinine   Date Value Ref Range Status   04/16/2019 0.98 0.76 - 1.27 mg/dL Final     Troponin T   Date Value Ref Range Status   04/16/2019 <0.010 0.000 - 0.030 ng/mL Final        Echo EF Estimated  No results found for: ECHOEFEST      Assessment/Plan       Bradycardia, sinus    Sleep-disordered breathing    Symptomatic bradycardia      A/P: 74 yo male with Symptomatic Bradycardia.    1. Symptomatic Bradycardia - in patient with baseline High degree AV Block with RBBB - now with intermittent 2:1 Heart Block - Mobitz Type 2 - block below the His - ECHO with preserved LV Function. Symptomatic with the Bradycardia. Episodes of bradycardia with LHC. Has CAD - intolerant of BB. Has multiple indications (Class I) for pacemaker implantation including Symptomatic Bradycardia, Symptomatic Mobitz Type 2 with Block below the His as well as intolerance of BB in patient with a history of Known CAD. Plan on Pacemaker implantation tomorrow morning.    Risks and Benefits were fully explained to the patient and his family. Despite knowing risks, they want to proceed. Shared-decision making was performed.      Thank you for allowing me to participate in the care of this patient. Warmest Regards.    Curtis Moore DO  04/17/19  7:03 PM      Time: > 75 minutes spent in evaluation and management.     EMR Dragon/Transcription disclaimer:   Much of this encounter note is an electronic transcription/translation of spoken language to printed text. The electronic translation of spoken language may permit erroneous, or at times, nonsensical words or phrases to be inadvertently transcribed.  Although I have reviewed the note for such  errors, some may still exist. Please contact me if needed for clarification or correction.

## 2019-04-17 NOTE — PROCEDURES
:   Lalo Naranjo MD    Procedures performed:  1.  Left heart catheterization.  2.  Left ventriculography    3.  Selective native coronary angiography    Indications: Abnormal plain treadmill stress test       Description of the procedure:  Patient was brought to the cardiac catheter was explained the risk and benefit and alternatives of the procedure. Patient signed informed consent, was prepped and draped in sterile fashion for right radial artery access.  Using micropuncture needle and modified Seldinger technique a 5/6 [place] sheath was advanced into the right radial artery.  JL3.5 and JR4 catheter was used to engage the left and right coronary artery respectively.  A pigtail catheter was used to cross the aortic valve and perform a left heart catheterization and LV gram.  Moderate sedation 30 minutes.    All exchanges were done over the wire.    Hemostasis was achieved by manual compression and radial band.    Patient went into high grade AV block during cardiac catheterization also.    Findings:  1.  Left heart catheterization: LVEDP 2 mmHg. No gradient across AV valve.      2.  Left ventriculography:  LV function normal.  LVEF 55% .     3.  Selective native coronary angiography:    A. Left main bifurcates into LAD and left circumflex. No angiographic disease.   B. LAD: Large vessel wraps around the apex. Proximal LAD 30%, followed by Mid 40-50% stenosis.   C. Left circumflex: Large dominant calcified vessel. Mid LCX 80% stenosis involving the ostia of OM2. OM2 is a bifurcating branch with inferior division also with 80% stenosis.  D. Right coronary artery: Small nondomiant vessel with Mid 60% stenosis.     Conclusion:    One vessel obstructive CAD involving the LCX.   Preserved LVEF .       Recommendations:  Patient rhythm abnormality not related to coronary disease with ITALIA 3 flow.   Consider EP evaluation for PPM.   LCX intervention can be staged electively with atherectomy.   Aggressive risk  factor modification.    CARINA BurgerD

## 2019-04-17 NOTE — PLAN OF CARE
Problem: Patient Care Overview  Goal: Plan of Care Review  Outcome: Ongoing (interventions implemented as appropriate)   04/17/19 2676   Coping/Psychosocial   Plan of Care Reviewed With patient;family   Plan of Care Review   Progress improving   OTHER   Outcome Summary Pt a&o. Vitals stable during shift. Pt had heart cath this AM. Procedure through RT radial. Site WNL. Pulse palpable. Pressure dressing in place. No c/o pain,SOA or discomfort noted during shift. Will monitor & follow up as needed.

## 2019-04-17 NOTE — PLAN OF CARE
Problem: Patient Care Overview  Goal: Plan of Care Review  Outcome: Ongoing (interventions implemented as appropriate)   04/17/19 0143   Coping/Psychosocial   Plan of Care Reviewed With patient   Plan of Care Review   Progress no change   OTHER   Outcome Summary VSS. SR with 1st degree, burst of 2nd degree. RA. Overnight sleep study, in progress. Crackles posterior lobes, lower. X-ray showed mild cardiomegaly. NPO for heart cath scheduled for 9:30, consents need to be signed (no order). Will continue to monitor.

## 2019-04-18 ENCOUNTER — APPOINTMENT (OUTPATIENT)
Dept: GENERAL RADIOLOGY | Facility: HOSPITAL | Age: 74
End: 2019-04-18

## 2019-04-18 PROBLEM — I44.30 AV BLOCK: Status: ACTIVE | Noted: 2019-04-15

## 2019-04-18 LAB
ANION GAP SERPL CALCULATED.3IONS-SCNC: 11.5 MMOL/L
ANION GAP SERPL CALCULATED.3IONS-SCNC: 12.5 MMOL/L
BUN BLD-MCNC: 15 MG/DL (ref 8–23)
BUN BLD-MCNC: 15 MG/DL (ref 8–23)
BUN/CREAT SERPL: 16.5 (ref 7–25)
BUN/CREAT SERPL: 17.4 (ref 7–25)
CALCIUM SPEC-SCNC: 8.1 MG/DL (ref 8.6–10.5)
CALCIUM SPEC-SCNC: 8.8 MG/DL (ref 8.6–10.5)
CHLORIDE SERPL-SCNC: 102 MMOL/L (ref 98–107)
CHLORIDE SERPL-SCNC: 103 MMOL/L (ref 98–107)
CHOLEST SERPL-MCNC: 149 MG/DL (ref 0–200)
CO2 SERPL-SCNC: 19.5 MMOL/L (ref 22–29)
CO2 SERPL-SCNC: 23.5 MMOL/L (ref 22–29)
CREAT BLD-MCNC: 0.86 MG/DL (ref 0.76–1.27)
CREAT BLD-MCNC: 0.91 MG/DL (ref 0.76–1.27)
DEPRECATED RDW RBC AUTO: 43.8 FL (ref 37–54)
DEPRECATED RDW RBC AUTO: 44.6 FL (ref 37–54)
ERYTHROCYTE [DISTWIDTH] IN BLOOD BY AUTOMATED COUNT: 12 % (ref 12.3–15.4)
ERYTHROCYTE [DISTWIDTH] IN BLOOD BY AUTOMATED COUNT: 12.3 % (ref 12.3–15.4)
GFR SERPL CREATININE-BSD FRML MDRD: 82 ML/MIN/1.73
GFR SERPL CREATININE-BSD FRML MDRD: 87 ML/MIN/1.73
GLUCOSE BLD-MCNC: 104 MG/DL (ref 65–99)
GLUCOSE BLD-MCNC: 141 MG/DL (ref 65–99)
HCT VFR BLD AUTO: 44.4 % (ref 37.5–51)
HCT VFR BLD AUTO: 47.8 % (ref 37.5–51)
HDLC SERPL-MCNC: 32 MG/DL (ref 40–60)
HGB BLD-MCNC: 14.3 G/DL (ref 13–17.7)
HGB BLD-MCNC: 15.5 G/DL (ref 13–17.7)
LDLC SERPL CALC-MCNC: 91 MG/DL (ref 0–100)
LDLC/HDLC SERPL: 2.84 {RATIO}
MCH RBC QN AUTO: 31.3 PG (ref 26.6–33)
MCH RBC QN AUTO: 32.4 PG (ref 26.6–33)
MCHC RBC AUTO-ENTMCNC: 32.2 G/DL (ref 31.5–35.7)
MCHC RBC AUTO-ENTMCNC: 32.4 G/DL (ref 31.5–35.7)
MCV RBC AUTO: 97.2 FL (ref 79–97)
MCV RBC AUTO: 99.8 FL (ref 79–97)
PLATELET # BLD AUTO: 125 10*3/MM3 (ref 140–450)
PLATELET # BLD AUTO: 125 10*3/MM3 (ref 140–450)
PMV BLD AUTO: 10 FL (ref 6–12)
PMV BLD AUTO: 10.1 FL (ref 6–12)
POTASSIUM BLD-SCNC: 3.8 MMOL/L (ref 3.5–5.2)
POTASSIUM BLD-SCNC: 4.1 MMOL/L (ref 3.5–5.2)
RBC # BLD AUTO: 4.57 10*6/MM3 (ref 4.14–5.8)
RBC # BLD AUTO: 4.79 10*6/MM3 (ref 4.14–5.8)
SODIUM BLD-SCNC: 135 MMOL/L (ref 136–145)
SODIUM BLD-SCNC: 137 MMOL/L (ref 136–145)
TRIGL SERPL-MCNC: 130 MG/DL (ref 0–150)
VLDLC SERPL-MCNC: 26 MG/DL (ref 5–40)
WBC NRBC COR # BLD: 4.88 10*3/MM3 (ref 3.4–10.8)
WBC NRBC COR # BLD: 5.89 10*3/MM3 (ref 3.4–10.8)

## 2019-04-18 PROCEDURE — 80061 LIPID PANEL: CPT | Performed by: INTERNAL MEDICINE

## 2019-04-18 PROCEDURE — C1898 LEAD, PMKR, OTHER THAN TRANS: HCPCS | Performed by: INTERNAL MEDICINE

## 2019-04-18 PROCEDURE — 0 IOPAMIDOL PER 1 ML: Performed by: INTERNAL MEDICINE

## 2019-04-18 PROCEDURE — 93010 ELECTROCARDIOGRAM REPORT: CPT | Performed by: INTERNAL MEDICINE

## 2019-04-18 PROCEDURE — 71045 X-RAY EXAM CHEST 1 VIEW: CPT

## 2019-04-18 PROCEDURE — C1785 PMKR, DUAL, RATE-RESP: HCPCS | Performed by: INTERNAL MEDICINE

## 2019-04-18 PROCEDURE — 0JH606Z INSERTION OF PACEMAKER, DUAL CHAMBER INTO CHEST SUBCUTANEOUS TISSUE AND FASCIA, OPEN APPROACH: ICD-10-PCS | Performed by: INTERNAL MEDICINE

## 2019-04-18 PROCEDURE — 93005 ELECTROCARDIOGRAM TRACING: CPT | Performed by: INTERNAL MEDICINE

## 2019-04-18 PROCEDURE — 25010000002 FENTANYL CITRATE (PF) 100 MCG/2ML SOLUTION: Performed by: INTERNAL MEDICINE

## 2019-04-18 PROCEDURE — 85027 COMPLETE CBC AUTOMATED: CPT | Performed by: INTERNAL MEDICINE

## 2019-04-18 PROCEDURE — 02HK3JZ INSERTION OF PACEMAKER LEAD INTO RIGHT VENTRICLE, PERCUTANEOUS APPROACH: ICD-10-PCS | Performed by: INTERNAL MEDICINE

## 2019-04-18 PROCEDURE — 33208 INSRT HEART PM ATRIAL & VENT: CPT | Performed by: INTERNAL MEDICINE

## 2019-04-18 PROCEDURE — 80048 BASIC METABOLIC PNL TOTAL CA: CPT | Performed by: INTERNAL MEDICINE

## 2019-04-18 PROCEDURE — C1894 INTRO/SHEATH, NON-LASER: HCPCS | Performed by: INTERNAL MEDICINE

## 2019-04-18 PROCEDURE — 25010000002 MIDAZOLAM PER 1 MG: Performed by: INTERNAL MEDICINE

## 2019-04-18 PROCEDURE — 02H63JZ INSERTION OF PACEMAKER LEAD INTO RIGHT ATRIUM, PERCUTANEOUS APPROACH: ICD-10-PCS | Performed by: INTERNAL MEDICINE

## 2019-04-18 PROCEDURE — C1769 GUIDE WIRE: HCPCS | Performed by: INTERNAL MEDICINE

## 2019-04-18 PROCEDURE — 25010000003 CEFAZOLIN IN DEXTROSE 2-4 GM/100ML-% SOLUTION: Performed by: INTERNAL MEDICINE

## 2019-04-18 DEVICE — LD PM TENDRIL STS 6F58CM 2088TC58: Type: IMPLANTABLE DEVICE | Status: FUNCTIONAL

## 2019-04-18 DEVICE — LD PM TENDRIL STS 6F52CM 2088TC52: Type: IMPLANTABLE DEVICE | Status: FUNCTIONAL

## 2019-04-18 DEVICE — GEN PM ASSURITY MRI DR RF PM2272: Type: IMPLANTABLE DEVICE | Status: FUNCTIONAL

## 2019-04-18 RX ORDER — ONDANSETRON 2 MG/ML
4 INJECTION INTRAMUSCULAR; INTRAVENOUS EVERY 6 HOURS PRN
Status: DISCONTINUED | OUTPATIENT
Start: 2019-04-18 | End: 2019-04-20 | Stop reason: HOSPADM

## 2019-04-18 RX ORDER — MIDAZOLAM HYDROCHLORIDE 1 MG/ML
INJECTION INTRAMUSCULAR; INTRAVENOUS AS NEEDED
Status: DISCONTINUED | OUTPATIENT
Start: 2019-04-18 | End: 2019-04-18 | Stop reason: HOSPADM

## 2019-04-18 RX ORDER — SODIUM CHLORIDE 0.9 % (FLUSH) 0.9 %
3 SYRINGE (ML) INJECTION EVERY 12 HOURS SCHEDULED
Status: DISCONTINUED | OUTPATIENT
Start: 2019-04-18 | End: 2019-04-20 | Stop reason: HOSPADM

## 2019-04-18 RX ORDER — SODIUM CHLORIDE 0.9 % (FLUSH) 0.9 %
3-10 SYRINGE (ML) INJECTION AS NEEDED
Status: DISCONTINUED | OUTPATIENT
Start: 2019-04-18 | End: 2019-04-20 | Stop reason: HOSPADM

## 2019-04-18 RX ORDER — SODIUM CHLORIDE 9 MG/ML
INJECTION, SOLUTION INTRAVENOUS CONTINUOUS PRN
Status: COMPLETED | OUTPATIENT
Start: 2019-04-18 | End: 2019-04-18

## 2019-04-18 RX ORDER — FENTANYL CITRATE 50 UG/ML
INJECTION, SOLUTION INTRAMUSCULAR; INTRAVENOUS AS NEEDED
Status: DISCONTINUED | OUTPATIENT
Start: 2019-04-18 | End: 2019-04-18 | Stop reason: HOSPADM

## 2019-04-18 RX ORDER — CEFAZOLIN SODIUM 2 G/100ML
2 INJECTION, SOLUTION INTRAVENOUS EVERY 8 HOURS
Status: COMPLETED | OUTPATIENT
Start: 2019-04-18 | End: 2019-04-19

## 2019-04-18 RX ORDER — ZOLPIDEM TARTRATE 5 MG/1
5 TABLET ORAL NIGHTLY PRN
Status: DISCONTINUED | OUTPATIENT
Start: 2019-04-18 | End: 2019-04-20 | Stop reason: HOSPADM

## 2019-04-18 RX ORDER — CEFAZOLIN SODIUM 2 G/100ML
INJECTION, SOLUTION INTRAVENOUS CONTINUOUS PRN
Status: DISCONTINUED | OUTPATIENT
Start: 2019-04-18 | End: 2019-04-18 | Stop reason: HOSPADM

## 2019-04-18 RX ORDER — OXYCODONE HYDROCHLORIDE AND ACETAMINOPHEN 5; 325 MG/1; MG/1
1 TABLET ORAL EVERY 4 HOURS PRN
Status: DISCONTINUED | OUTPATIENT
Start: 2019-04-18 | End: 2019-04-20 | Stop reason: HOSPADM

## 2019-04-18 RX ORDER — LIDOCAINE HYDROCHLORIDE 10 MG/ML
INJECTION, SOLUTION INFILTRATION; PERINEURAL AS NEEDED
Status: DISCONTINUED | OUTPATIENT
Start: 2019-04-18 | End: 2019-04-18 | Stop reason: HOSPADM

## 2019-04-18 RX ADMIN — OXYCODONE HYDROCHLORIDE AND ACETAMINOPHEN 1 TABLET: 5; 325 TABLET ORAL at 15:02

## 2019-04-18 RX ADMIN — LOSARTAN POTASSIUM 25 MG: 25 TABLET, FILM COATED ORAL at 12:49

## 2019-04-18 RX ADMIN — QUETIAPINE FUMARATE 50 MG: 50 TABLET, FILM COATED ORAL at 20:01

## 2019-04-18 RX ADMIN — CETIRIZINE HYDROCHLORIDE 10 MG: 10 TABLET, FILM COATED ORAL at 12:49

## 2019-04-18 RX ADMIN — MIRTAZAPINE 7.5 MG: 15 TABLET, FILM COATED ORAL at 20:01

## 2019-04-18 RX ADMIN — CEFAZOLIN SODIUM 2 G: 2 INJECTION, SOLUTION INTRAVENOUS at 15:12

## 2019-04-18 RX ADMIN — OXYCODONE HYDROCHLORIDE AND ACETAMINOPHEN 1 TABLET: 5; 325 TABLET ORAL at 22:45

## 2019-04-18 RX ADMIN — PANTOPRAZOLE SODIUM 40 MG: 40 TABLET, DELAYED RELEASE ORAL at 06:39

## 2019-04-18 NOTE — PLAN OF CARE
Problem: Patient Care Overview  Goal: Plan of Care Review  Outcome: Ongoing (interventions implemented as appropriate)   04/18/19 7060   Coping/Psychosocial   Plan of Care Reviewed With patient   Plan of Care Review   Progress improving   OTHER   Outcome Summary VSS. Hr 50-60's sinus isha. No complaints of pain. NPO for PPM this am. Will continue to monitor       Problem: Arrhythmia/Dysrhythmia (Symptomatic) (Adult)  Goal: Signs and Symptoms of Listed Potential Problems Will be Absent, Minimized or Managed (Arrhythmia/Dysrhythmia)  Outcome: Ongoing (interventions implemented as appropriate)      Problem: Fall Risk (Adult)  Goal: Absence of Fall  Outcome: Ongoing (interventions implemented as appropriate)

## 2019-04-18 NOTE — PLAN OF CARE
Problem: Patient Care Overview  Goal: Plan of Care Review  Outcome: Ongoing (interventions implemented as appropriate)   04/18/19 1800   Coping/Psychosocial   Plan of Care Reviewed With patient;spouse;family   Plan of Care Review   Progress improving   OTHER   Outcome Summary Pt A&O. VSS. Had permanent pacemaker placed this AM in [facility]. LUE mobilized with sling. Pt education given r/t post-op directions. Site to chest monitored for bleeding. Dsg in place & intact with no drainage. C/O pain & recv'd prn pain med with effective results. Voiding without difficulty. BM this shift. No cardiac cath orders at this time. Will make family aware once obtained. Will monitor & follow up as needed.

## 2019-04-18 NOTE — PROGRESS NOTES
Cardiology/Electrophysiology Progress Note      Patient Name: Godfrey Shah  Age/Sex: 73 y.o. male  : 1945  MRN: [id number]      Day of Service: 19       Chief Complaint/Follow-up:     Interval History:     S: Patient seen and examined. Tele Reviewed. No acute issues.      Temp:  [98 °F (36.7 °C)-98.2 °F (36.8 °C)] 98.2 °F (36.8 °C)  Heart Rate:  [47-67] 51  Resp:  [16-18] 18  BP: (119-143)/(73-87) 129/73     AO x 3, NAD  Eyes PERRL, EOMI  Neck supple. No JVD  CTA  RRR  BS intact  DP intact. No edema  Skin warm and dry      ECG/TELE: Reviewed.      Results from last 7 days   Lab Units 19  0311 19  0344 04/15/19  2302   SODIUM mmol/L 137 142 143   POTASSIUM mmol/L 3.8 3.8 3.8   CHLORIDE mmol/L 102 104 105   CO2 mmol/L 23.5 24.2 25.6   BUN mg/dL 15 12 11   CREATININE mg/dL 0.86 0.98 0.91   GLUCOSE mg/dL 141* 104* 97   CALCIUM mg/dL 8.1* 8.6 8.5*     Results from last 7 days   Lab Units 19  0311 04/15/19  2302   WBC 10*3/mm3 4.88 5.24   HEMOGLOBIN g/dL 14.3 15.4   HEMATOCRIT % 44.4 46.6   PLATELETS 10*3/mm3 125* 142     Results from last 7 days   Lab Units 04/15/19  2302   INR  1.04       Current Medications:   Scheduled Meds:  ceFAZolin 2 g Intravenous 60 Min Pre-Op   [MAR Hold] cetirizine 10 mg Oral Daily   losartan 25 mg Oral Daily   [MAR Hold] mirtazapine 7.5 mg Oral Nightly   [MAR Hold] pantoprazole 40 mg Oral Q AM   [MAR Hold] QUEtiapine 50 mg Oral Nightly   sodium chloride 3 mL Intravenous Q12H     Continuous Infusions:         AV block    Bradycardia, sinus    Sleep-disordered breathing    Symptomatic bradycardia         A/P: 72 yo male with Symptomatic Bradycardia.     1. Symptomatic Bradycardia - in patient with baseline High degree AV Block with RBBB - now with intermittent 2:1 Heart Block - Mobitz Type 2 - block below the His - ECHO with preserved LV Function. Symptomatic with the Bradycardia. Episodes of bradycardia with LHC. Has CAD - intolerant of BB. Has multiple  indications (Class I) for pacemaker implantation including Symptomatic Bradycardia, Symptomatic Mobitz Type 2 with Block below the His as well as intolerance of BB in patient with a history of Known CAD. Plan on Pacemaker implantation  Today - then clear for PCI tomorrow and initiation of BB.    Risks and Benefits were fully explained to the patient and his family. Despite knowing risks, they want to proceed. Shared-decision making was performed.          Curtis Moore DO  04/18/19  8:29 AM

## 2019-04-18 NOTE — PROGRESS NOTES
Discharge Planning Assessment  [facility]     Patient Name: Godfrey Shah  MRN: [id number]  Today's Date: 4/18/2019    Admit Date: 4/15/2019    Discharge Needs Assessment     Row Name 04/18/19 7567       Living Environment    Lives With  spouse    Name(s) of Who Lives With Patient  Silvia Shah, spouse     Current Living Arrangements  home/apartment/condo    Primary Care Provided by  self    Provides Primary Care For  no one    Family Caregiver if Needed  spouse    Quality of Family Relationships  helpful;supportive;involved    Able to Return to Prior Arrangements  yes       Resource/Environmental Concerns    Resource/Environmental Concerns  none       Transition Planning    Patient/Family Anticipates Transition to  home with family    Patient/Family Anticipated Services at Transition  none    Transportation Anticipated  family or friend will provide       Discharge Needs Assessment    Readmission Within the Last 30 Days  no previous admission in last 30 days    Concerns to be Addressed  no discharge needs identified;denies needs/concerns at this time    Equipment Currently Used at Home  grab bar    Anticipated Changes Related to Illness  none    Equipment Needed After Discharge  none    Offered/Gave Vendor List  no        Discharge Plan     Row Name 04/18/19 1727       Plan    Plan  Home;  Denies needs.     Patient/Family in Agreement with Plan  yes    Plan Comments  Spoke with Pt, spouse Silvia Severino ([phone]) and daughter, Tia at bedside.  CCP introduced self and role.  Pt confirmed information on face sheet.  Pt stated he is IADL'S, retired & drives.  Pt reports he stays active bowling & mowing his lawn.  Pt lives in a house with no entrance steps.  Pt reports he uses a walking stick at times for ambulation aid.  Pt has grab bars in bathroom.  Pt confirms pharmacy as [facility] on [facility].  Pt denies issues with affording his medications.  Pt denies past home health, subacute rehab and DME.  Pt  plans to return home at discharge and denies needs at this time.  CCP will continue to follow…GRIS ELIZABETH/CCP        Destination      No service coordination in this encounter.      Durable Medical Equipment      No service coordination in this encounter.      Dialysis/Infusion      No service coordination in this encounter.      Home Medical Care      No service coordination in this encounter.      Therapy      No service coordination in this encounter.      Community Resources      No service coordination in this encounter.          Demographic Summary     Row Name 04/18/19 1710       General Information    Admission Type  inpatient    Arrived From  home    Required Notices Provided  Important Message from Medicare    Referral Source  admission list    Reason for Consult  discharge planning    Preferred Language  English     Used During This Interaction  no        Functional Status     Row Name 04/18/19 1710       Functional Status    Usual Activity Tolerance  good    Current Activity Tolerance  good       Functional Status, IADL    Medications  independent    Meal Preparation  independent    Housekeeping  independent    Laundry  independent    Shopping  independent       Mental Status    General Appearance WDL  WDL       Mental Status Summary    Recent Changes in Mental Status/Cognitive Functioning  no changes       Employment/    Employment Status  retired        Psychosocial    No documentation.       Abuse/Neglect    No documentation.       Legal    No documentation.       Substance Abuse    No documentation.       Patient Forms    No documentation.           Trang Moya RN

## 2019-04-18 NOTE — PROGRESS NOTES
[facility]  [phone]  Rajinder Naidu MD    Subjective:  LOS: 0    Now status post pacemaker by cardiology.  Denies any respiratory complaints.    Objective   Vital Signs past 24hrs    Temp range: Temp (24hrs), Av.3 °F (36.8 °C), Min:98 °F (36.7 °C), Max:98.6 °F (37 °C)    BP range: BP: (119-143)/(73-85) 139/81  Pulse range: Heart Rate:  [47-67] 60  Resp rate range: Resp:  [16-18] 16    Device (Oxygen Therapy): room airFlow (L/min):  [3] 3  Oxygen range:SpO2:  [92 %-96 %] 94 %      85 kg (187 lb 6 oz); Body mass index is 28.49 kg/m².    Intake/Output Summary (Last 24 hours) at 2019 1222  Last data filed at 2019 0934  Gross per 24 hour   Intake 1472 ml   Output --   Net 1472 ml       Physical Exam   Constitutional: He appears well-developed.   Cardiovascular: Normal rate and regular rhythm.   No murmur heard.  Pulmonary/Chest: He has no wheezes. He has no rales.   Abdominal: Soft. Bowel sounds are normal. There is no tenderness.   Musculoskeletal: He exhibits no edema.   Neurological: He is alert.   Nursing note and vitals reviewed.    Results Review:    I have reviewed the laboratory and imaging data since the last note by [facility] physician.  My annotations are noted in assessment and plan.    Overnight oximetry on 2019 on room air shows saturation remains above 90% for the entire night.  There was no events of desaturation.  Supplemental oxygen is therefore not required.    Medication Review:  I have reviewed the current MAR.  My annotations are noted in assessment and plan.       Plan   PCCM Problems  COPD without exacerbation  High-grade AV block pending pacemaker  Coronary artery disease  Previous diagnosis obstructive sleep apnea    Plan of Treatment  No exacerbation COPD.  Chest x-ray clear.  Continue with breathing treatments as needed.    Chest x-ray after pacemaker placement shows leads in position but pacemaker is only partly visualized in the left lung is cut off.  However  the visualized lung fields are clear without effusion or infiltrate.    Prior diagnosis sleep apnea 15 years ago.  Recent negative sleep study.  Nocturnal oximetry study does not show evidence of hypoxia with sleep.    Will arrange outpatient sleep study for confirmatory diagnosis.  This is in view of AV block and now consideration of pacemaker placement along with coronary artery disease.  Explained to patient.      Rajinder Naidu MD  04/18/19  12:22 PM    Part of this note may be an electronic transcription/translation of spoken language to printed text using the Dragon Dictation System.

## 2019-04-19 ENCOUNTER — APPOINTMENT (OUTPATIENT)
Dept: GENERAL RADIOLOGY | Facility: HOSPITAL | Age: 74
End: 2019-04-19

## 2019-04-19 LAB
ANION GAP SERPL CALCULATED.3IONS-SCNC: 11.6 MMOL/L
BUN BLD-MCNC: 17 MG/DL (ref 8–23)
BUN/CREAT SERPL: 18.5 (ref 7–25)
CALCIUM SPEC-SCNC: 8.4 MG/DL (ref 8.6–10.5)
CHLORIDE SERPL-SCNC: 106 MMOL/L (ref 98–107)
CO2 SERPL-SCNC: 22.4 MMOL/L (ref 22–29)
CREAT BLD-MCNC: 0.92 MG/DL (ref 0.76–1.27)
DEPRECATED RDW RBC AUTO: 44.5 FL (ref 37–54)
ERYTHROCYTE [DISTWIDTH] IN BLOOD BY AUTOMATED COUNT: 12.2 % (ref 12.3–15.4)
GFR SERPL CREATININE-BSD FRML MDRD: 81 ML/MIN/1.73
GLUCOSE BLD-MCNC: 148 MG/DL (ref 65–99)
GLUCOSE BLDC GLUCOMTR-MCNC: 121 MG/DL (ref 70–130)
HCT VFR BLD AUTO: 43.3 % (ref 37.5–51)
HGB BLD-MCNC: 14.3 G/DL (ref 13–17.7)
INR PPP: 1.05 (ref 0.9–1.1)
MCH RBC QN AUTO: 32.5 PG (ref 26.6–33)
MCHC RBC AUTO-ENTMCNC: 33 G/DL (ref 31.5–35.7)
MCV RBC AUTO: 98.4 FL (ref 79–97)
PLATELET # BLD AUTO: 117 10*3/MM3 (ref 140–450)
PMV BLD AUTO: 10.1 FL (ref 6–12)
POTASSIUM BLD-SCNC: 3.8 MMOL/L (ref 3.5–5.2)
PROTHROMBIN TIME: 13.4 SECONDS (ref 11.7–14.2)
RBC # BLD AUTO: 4.4 10*6/MM3 (ref 4.14–5.8)
SODIUM BLD-SCNC: 140 MMOL/L (ref 136–145)
WBC NRBC COR # BLD: 6.08 10*3/MM3 (ref 3.4–10.8)

## 2019-04-19 PROCEDURE — 93010 ELECTROCARDIOGRAM REPORT: CPT | Performed by: INTERNAL MEDICINE

## 2019-04-19 PROCEDURE — 71046 X-RAY EXAM CHEST 2 VIEWS: CPT

## 2019-04-19 PROCEDURE — 85027 COMPLETE CBC AUTOMATED: CPT | Performed by: NURSE PRACTITIONER

## 2019-04-19 PROCEDURE — 25010000003 CEFAZOLIN IN DEXTROSE 2-4 GM/100ML-% SOLUTION: Performed by: INTERNAL MEDICINE

## 2019-04-19 PROCEDURE — 82962 GLUCOSE BLOOD TEST: CPT

## 2019-04-19 PROCEDURE — 93005 ELECTROCARDIOGRAM TRACING: CPT | Performed by: INTERNAL MEDICINE

## 2019-04-19 PROCEDURE — 71045 X-RAY EXAM CHEST 1 VIEW: CPT

## 2019-04-19 PROCEDURE — 80048 BASIC METABOLIC PNL TOTAL CA: CPT | Performed by: NURSE PRACTITIONER

## 2019-04-19 PROCEDURE — 85610 PROTHROMBIN TIME: CPT | Performed by: INTERNAL MEDICINE

## 2019-04-19 RX ORDER — ATORVASTATIN CALCIUM 20 MG/1
20 TABLET, FILM COATED ORAL NIGHTLY
Status: DISCONTINUED | OUTPATIENT
Start: 2019-04-19 | End: 2019-04-20 | Stop reason: HOSPADM

## 2019-04-19 RX ORDER — ASPIRIN 81 MG/1
81 TABLET ORAL DAILY
Status: DISCONTINUED | OUTPATIENT
Start: 2019-04-19 | End: 2019-04-20 | Stop reason: HOSPADM

## 2019-04-19 RX ORDER — CLOPIDOGREL BISULFATE 75 MG/1
75 TABLET ORAL DAILY
Status: DISCONTINUED | OUTPATIENT
Start: 2019-04-19 | End: 2019-04-20 | Stop reason: HOSPADM

## 2019-04-19 RX ADMIN — ASPIRIN 81 MG: 81 TABLET, DELAYED RELEASE ORAL at 18:09

## 2019-04-19 RX ADMIN — ATORVASTATIN CALCIUM 20 MG: 20 TABLET, FILM COATED ORAL at 21:53

## 2019-04-19 RX ADMIN — CETIRIZINE HYDROCHLORIDE 10 MG: 10 TABLET, FILM COATED ORAL at 11:31

## 2019-04-19 RX ADMIN — SODIUM CHLORIDE, PRESERVATIVE FREE 3 ML: 5 INJECTION INTRAVENOUS at 21:54

## 2019-04-19 RX ADMIN — CEFAZOLIN SODIUM 2 G: 2 INJECTION, SOLUTION INTRAVENOUS at 00:06

## 2019-04-19 RX ADMIN — SODIUM CHLORIDE, PRESERVATIVE FREE 3 ML: 5 INJECTION INTRAVENOUS at 09:00

## 2019-04-19 RX ADMIN — LOSARTAN POTASSIUM 25 MG: 25 TABLET, FILM COATED ORAL at 11:31

## 2019-04-19 RX ADMIN — OXYCODONE HYDROCHLORIDE AND ACETAMINOPHEN 1 TABLET: 5; 325 TABLET ORAL at 19:46

## 2019-04-19 RX ADMIN — MIRTAZAPINE 7.5 MG: 15 TABLET, FILM COATED ORAL at 21:53

## 2019-04-19 RX ADMIN — PANTOPRAZOLE SODIUM 40 MG: 40 TABLET, DELAYED RELEASE ORAL at 06:58

## 2019-04-19 RX ADMIN — OXYCODONE HYDROCHLORIDE AND ACETAMINOPHEN 1 TABLET: 5; 325 TABLET ORAL at 11:35

## 2019-04-19 RX ADMIN — QUETIAPINE FUMARATE 50 MG: 50 TABLET, FILM COATED ORAL at 21:54

## 2019-04-19 NOTE — PROGRESS NOTES
Godfrey Shah   73 y.o.  male    LOS: 1 day   Patient Care Team:  Godfrey Jackson MD as PCP - General (Family Medicine)      Subjective     Interval History:     Patient Complaints: No shortness of air of any significance.  No chest pain.  No palpitations or lightheadedness.    Review of Systems:   No subjective fever or chills  No headache no recent hearing or vision changes  No cough or hemoptysis  No hematochezia or melena  No hematuria dysuria  No significant rash  No localized numbness weakness tingling of extremities  Medication Review:   Current Facility-Administered Medications:   •  acetaminophen (TYLENOL) tablet 650 mg, 650 mg, Oral, Q6H PRN, Erick Jacinto MD  •  albuterol (PROVENTIL) nebulizer solution 0.083% 2.5 mg/3mL, 2.5 mg, Nebulization, Q6H PRN, Lalo Naranjo MD  •  ALPRAZolam (XANAX) tablet 0.25 mg, 0.25 mg, Oral, BID PRN, Emelyn Donahue MD, 0.25 mg at 04/16/19 1402  •  aspirin EC tablet 81 mg, 81 mg, Oral, Daily, Frandy Srivastava MD  •  atorvastatin (LIPITOR) tablet 20 mg, 20 mg, Oral, Nightly, Frandy Srivastava MD  •  atropine injection 0.4 mg, 0.4 mg, Intravenous, Once PRN, Erick Jacinto MD  •  cetirizine (zyrTEC) tablet 10 mg, 10 mg, Oral, Daily, Erick Jacinto MD, 10 mg at 04/19/19 1131  •  clopidogrel (PLAVIX) tablet 75 mg, 75 mg, Oral, Daily, Frandy Srivastava MD  •  ipratropium-albuterol (DUO-NEB) nebulizer solution 3 mL, 3 mL, Nebulization, Q4H PRN, Emelyn Donahue MD, 3 mL at 04/16/19 1642  •  losartan (COZAAR) tablet 25 mg, 25 mg, Oral, Daily, Lalo Naranjo MD, 25 mg at 04/19/19 1131  •  mirtazapine (REMERON) tablet 7.5 mg, 7.5 mg, Oral, Nightly, Lalo Naranjo MD, 7.5 mg at 04/18/19 2001  •  nitroglycerin (NITROSTAT) SL tablet 0.4 mg, 0.4 mg, Sublingual, Q5 Min PRN, Erick Jacinto MD  •  ondansetron (ZOFRAN) injection 4 mg, 4 mg, Intravenous, Q6H PRN, Erick Jacinto MD  •  ondansetron (ZOFRAN) injection 4 mg, 4  mg, Intravenous, Q6H PRN, Aparnaonde Curtis S., DO  •  oxyCODONE-acetaminophen (PERCOCET) 5-325 MG per tablet 1 tablet, 1 tablet, Oral, Q4H PRN, Stacey Mooreep S., DO, 1 tablet at 04/19/19 1135  •  pantoprazole (PROTONIX) EC tablet 40 mg, 40 mg, Oral, Q AM, Lalo Naranjo MD, 40 mg at 04/19/19 0658  •  QUEtiapine (SEROquel) tablet 50 mg, 50 mg, Oral, Nightly, Erick Jacinto MD, 50 mg at 04/18/19 2001  •  sodium chloride 0.9 % flush 3 mL, 3 mL, Intravenous, Q12H, Aparnaonde Curtis S., DO  •  sodium chloride 0.9 % flush 3-10 mL, 3-10 mL, Intravenous, PRN, Gaitonde, Curtis S., DO  •  zolpidem (AMBIEN) tablet 5 mg, 5 mg, Oral, Nightly PRN, Gaitonde, Curtis S., DO      Objective     Vital Sign Min/Max for last 24 hours  Temp  Min: 97.6 °F (36.4 °C)  Max: 98.6 °F (37 °C)   BP  Min: 123/67  Max: 145/76    Pulse  Min: 59  Max: 85     Wt Readings from Last 3 Encounters:   04/15/19 85 kg (187 lb 6 oz)   01/06/17 94.8 kg (209 lb)   09/02/16 90.3 kg (199 lb)        Intake/Output Summary (Last 24 hours) at 4/19/2019 1412  Last data filed at 4/19/2019 1300  Gross per 24 hour   Intake 738 ml   Output --   Net 738 ml     Physical Exam:      General Appearance:    Well developed and well nourished in no acute distress   Head:    Normocephalic, atraumatic   Eyes:            Conjunctivae normal, non icteric, no xanthelasma   Neck:   no carotid bruit, no JVD   Lungs:    Some decreased breath sounds bilaterally    Heart:    Regular rate and rhythm.  Normal S1 and S2. No murmur, no gallop, rub or lift.    Chest Wall:    No abnormalities observed   Abdomen:     Normal bowel sounds, no masses, no organomegaly, soft        non-tender, non-distended, no guarding, no rebound                tenderness   Rectal:     Deferred   Extremities:   No clubbing, cyanosis oredema.     Pulses:   Pulses palpable and equal bilaterally   Skin:   No bleeding, bruising or rash   Neurologic:   awake alert and oriented x3, speech clear and approp,  no facial drooping      Monitor:  nsr  Results Review:     I reviewed the patient's new clinical results.    Sodium Sodium   Date Value Ref Range Status   04/19/2019 140 136 - 145 mmol/L Final   04/18/2019 135 (L) 136 - 145 mmol/L Final   04/18/2019 137 136 - 145 mmol/L Final      Potassium Potassium   Date Value Ref Range Status   04/19/2019 3.8 3.5 - 5.2 mmol/L Final   04/18/2019 4.1 3.5 - 5.2 mmol/L Final   04/18/2019 3.8 3.5 - 5.2 mmol/L Final      Chloride Chloride   Date Value Ref Range Status   04/19/2019 106 98 - 107 mmol/L Final   04/18/2019 103 98 - 107 mmol/L Final   04/18/2019 102 98 - 107 mmol/L Final      Bicarbonate No results found for: PLASMABICARB   BUN BUN   Date Value Ref Range Status   04/19/2019 17 8 - 23 mg/dL Final   04/18/2019 15 8 - 23 mg/dL Final   04/18/2019 15 8 - 23 mg/dL Final      Creatinine Creatinine   Date Value Ref Range Status   04/19/2019 0.92 0.76 - 1.27 mg/dL Final   04/18/2019 0.91 0.76 - 1.27 mg/dL Final   04/18/2019 0.86 0.76 - 1.27 mg/dL Final      Calcium Calcium   Date Value Ref Range Status   04/19/2019 8.4 (L) 8.6 - 10.5 mg/dL Final   04/18/2019 8.8 8.6 - 10.5 mg/dL Final   04/18/2019 8.1 (L) 8.6 - 10.5 mg/dL Final      Magnesium No results found for: MG     Results from last 7 days   Lab Units 04/19/19  0308   WBC 10*3/mm3 6.08   HEMOGLOBIN g/dL 14.3   HEMATOCRIT % 43.3   PLATELETS 10*3/mm3 117*     Lab Results   Lab Value Date/Time    TROPONINT <0.010 04/16/2019 0344    TROPONINT <0.010 04/15/2019 2302     Lab Results   Component Value Date    CHOL 149 04/18/2019    CHOL 154 04/16/2019     Lab Results   Component Value Date    HDL 32 (L) 04/18/2019    HDL 30 (L) 04/16/2019     Lab Results   Component Value Date    LDL 91 04/18/2019    LDL 94 04/16/2019     Lab Results   Component Value Date    TRIG 130 04/18/2019    TRIG 148 04/16/2019     No components found for: CHOLHDL    Results from last 7 days   Lab Units 04/19/19  0308 04/15/19  2302   INR  1.05 1.04          Echo EF Estimated  No results found for: ECHOEFEST       Assessment/ Plan    Symptomatic bradycardia, Mobitz type II second-degree AV block occurred during exercise testing and he developed symptoms with exercise as an outpatient.  Coronary artery disease 80% circumflex stenosis to have PCI later  Mild thrombocytopenia  COPD  Question of tiny pneumothorax on today's chest x-ray.  Pulmonary has ordered an expiratory film which is not been done yet  Plan #1 I have thought about discharging the patient but then I realized the pulmonary has ordered another chest x-ray which is not been done yet.  It less likely that the patient will be able to be discharge today we will have to wait and see what that x-ray shows.  2.  Start him on aspirin Plavix and atorvastatin.  He has an abnormal lipid profile with a low HDL and a higher LDL than we would like in a patient with known coronary disease.  3.  Dr. Naranjo did not want to do the PCI today since he does use anticoagulation at the time of the procedure and was worried about any bleeding into the pacemaker pocket.  #4 had a long discussion today with the patient his wife and some family members.  Also extensive discussion with the patient's nurse  04/19/19  2:12 PM      Time: 22 min

## 2019-04-19 NOTE — PROGRESS NOTES
[facility]  [phone]  Rajinder Naidu MD    Subjective:  LOS: 1    He denies any respiratory complaints.  Mild cough is however present.    Objective   Vital Signs past 24hrs    Temp range: Temp (24hrs), Av.3 °F (36.8 °C), Min:97.6 °F (36.4 °C), Max:98.6 °F (37 °C)    BP range: BP: (123-145)/(67-83) 139/83  Pulse range: Heart Rate:  [59-85] 66  Resp rate range: Resp:  [16-17] 16    Device (Oxygen Therapy): room air   Oxygen range:SpO2:  [92 %-95 %] 95 %      85 kg (187 lb 6 oz); Body mass index is 28.49 kg/m².    Intake/Output Summary (Last 24 hours) at 2019 1252  Last data filed at 2019 2000  Gross per 24 hour   Intake 498 ml   Output --   Net 498 ml       Physical Exam   Constitutional: He appears well-developed.   Cardiovascular: Normal rate and regular rhythm.   No murmur heard.  Pulmonary/Chest: He has no wheezes. He has no rales.   Abdominal: Soft. Bowel sounds are normal. There is no tenderness.   Musculoskeletal: He exhibits no edema.   Neurological: He is alert.   Nursing note and vitals reviewed.    Results Review:    I have reviewed the laboratory and imaging data since the last note by [facility] physician.  My annotations are noted in assessment and plan.    Overnight oximetry on 2019 on room air shows saturation remains above 90% for the entire night.  There was no events of desaturation.  Supplemental oxygen is therefore not required.    Medication Review:  I have reviewed the current MAR.  My annotations are noted in assessment and plan.       Plan   PCCM Problems  COPD without exacerbation  High-grade AV block pending pacemaker  Coronary artery disease  Previous diagnosis obstructive sleep apnea    Plan of Treatment  No exacerbation COPD.  Continue with breathing treatments as needed.    Chest x-ray after pacemaker placement shows leads in position.  Report of small pneumothorax in left lung base.  Chest film today is very poor quality.  I will repeat expiratory  film.    Prior diagnosis sleep apnea 15 years ago.  Recent negative sleep study.  Nocturnal oximetry study does not show evidence of hypoxia with sleep.    Will arrange outpatient sleep study for confirmatory diagnosis.  This is in view of AV block and now consideration of pacemaker placement along with coronary artery disease.  Explained to patient.      Rajinder Naidu MD  04/19/19  12:52 PM    Part of this note may be an electronic transcription/translation of spoken language to printed text using the Dragon Dictation System.

## 2019-04-19 NOTE — CONSULTS
"Met with patient, discussed benefits of cardiac rehab. Provided phase II information packet, which includes; general information about cardiac rehab, hospitals Cardiac Rehab Programs handout and [employer] letter article entitled “Cardiac Rehab is often the Best Medicine for Recovery\", stresses the importance of cardiac rehab after a heart event.   I provided the contact information for cardiac rehab here at [facility] and encouraged him to call when discharged.      "

## 2019-04-19 NOTE — PLAN OF CARE
Problem: Patient Care Overview  Goal: Plan of Care Review  Outcome: Ongoing (interventions implemented as appropriate)   04/19/19 2206   Coping/Psychosocial   Plan of Care Reviewed With patient   Plan of Care Review   Progress improving   OTHER   Outcome Summary VSS. L upper chest site C/D/I and small bruising present. NPO for possible heart cath this AM. Complaints of pain medicated per MAR. Pacemaker rep to interrogate device this AM. Will continue to monitor       Problem: Arrhythmia/Dysrhythmia (Symptomatic) (Adult)  Goal: Signs and Symptoms of Listed Potential Problems Will be Absent, Minimized or Managed (Arrhythmia/Dysrhythmia)  Outcome: Ongoing (interventions implemented as appropriate)      Problem: Fall Risk (Adult)  Goal: Absence of Fall  Outcome: Ongoing (interventions implemented as appropriate)

## 2019-04-20 ENCOUNTER — APPOINTMENT (OUTPATIENT)
Dept: GENERAL RADIOLOGY | Facility: HOSPITAL | Age: 74
End: 2019-04-20

## 2019-04-20 VITALS
HEART RATE: 63 BPM | TEMPERATURE: 98.5 F | RESPIRATION RATE: 20 BRPM | BODY MASS INDEX: 28.73 KG/M2 | SYSTOLIC BLOOD PRESSURE: 149 MMHG | WEIGHT: 189.6 LBS | HEIGHT: 68 IN | OXYGEN SATURATION: 94 % | DIASTOLIC BLOOD PRESSURE: 73 MMHG

## 2019-04-20 PROCEDURE — 71045 X-RAY EXAM CHEST 1 VIEW: CPT

## 2019-04-20 RX ORDER — ASPIRIN 81 MG/1
81 TABLET ORAL DAILY
Qty: 30 TABLET | Refills: 5 | Status: SHIPPED | OUTPATIENT
Start: 2019-04-21 | End: 2020-06-16

## 2019-04-20 RX ORDER — ATORVASTATIN CALCIUM 20 MG/1
20 TABLET, FILM COATED ORAL NIGHTLY
Qty: 30 TABLET | Refills: 5 | Status: SHIPPED | OUTPATIENT
Start: 2019-04-20 | End: 2020-11-18 | Stop reason: SDUPTHER

## 2019-04-20 RX ORDER — NITROGLYCERIN 0.4 MG/1
0.4 TABLET SUBLINGUAL
Qty: 30 TABLET | Refills: 12 | Status: SHIPPED | OUTPATIENT
Start: 2019-04-20

## 2019-04-20 RX ORDER — CLOPIDOGREL BISULFATE 75 MG/1
75 TABLET ORAL DAILY
Qty: 30 TABLET | Refills: 5 | Status: SHIPPED | OUTPATIENT
Start: 2019-04-21 | End: 2020-11-18

## 2019-04-20 RX ORDER — OXYCODONE HYDROCHLORIDE AND ACETAMINOPHEN 5; 325 MG/1; MG/1
1 TABLET ORAL EVERY 4 HOURS PRN
Qty: 20 TABLET | Refills: 0 | Status: SHIPPED | OUTPATIENT
Start: 2019-04-20 | End: 2019-04-28

## 2019-04-20 RX ADMIN — ASPIRIN 81 MG: 81 TABLET, DELAYED RELEASE ORAL at 09:05

## 2019-04-20 RX ADMIN — LOSARTAN POTASSIUM 25 MG: 25 TABLET, FILM COATED ORAL at 09:05

## 2019-04-20 RX ADMIN — PANTOPRAZOLE SODIUM 40 MG: 40 TABLET, DELAYED RELEASE ORAL at 05:47

## 2019-04-20 RX ADMIN — CETIRIZINE HYDROCHLORIDE 10 MG: 10 TABLET, FILM COATED ORAL at 09:05

## 2019-04-20 RX ADMIN — SODIUM CHLORIDE, PRESERVATIVE FREE 3 ML: 5 INJECTION INTRAVENOUS at 09:05

## 2019-04-20 NOTE — CONSULTS
Thoracic surgery consult note.  Req phys Dr Naidu  Reason, PNtx following pacemaker  Patient underwent pacemaker placement via the left subclavian approach yesterday.  I personally discussed his case with the performing cardiologist .  Procedure was uneventful.  Minimally invasive technique was used with small bore needles.  Postoperatively a pneumothorax was present.  Follow-up chest x-ray today shows that the pneumothorax is stable.  Official report is pending but I have examined both sets of films.  The patient does not have dyspnea at rest.  Supplemental nasal cannula oxygen is in place.  He does not describe discomfort when moving.  No cough or hemoptysis.  The patient has underlying COPD with an FEV1 of 48% of predicted.  He uses CPAP in the past.  Not currently using.  The reason for the pacemaker was symptomatic bradycardia with 2-1 AV block  Patient's medications are reviewed.  He is to be taking Plavix and aspirin.  These have been held in anticipation of a possible chest tube.  Previous surgical and history includes cardiac catheterization.  Medical history notable for COPD anxiety.  Complete review of systems reviewed with the patient.  Also reviewed on the chart.  No other current symptomatology other than listed as above.  Family history not notable for  COPD alpha-1  Patient is a former smoker.  Staying smoke free.  Other pertinent history essential hypertension overweight  Physical examination patient resting comfortably in bed no distress.  Normocephalic conjunctiva pink sclera anicteric neck is supple no cervical supraclavicular axillary adenopathy no palpable subcutaneous emphysema trachea is midline not displaced.  Breath sounds are clear bilaterally.  Pacemaker is present in the left upper quadrant of the chest.  No wheezing.  Breath sounds only minimally diminished on the left.  No dullness to percussion.  No palpable subcutaneous emphysema.  Cardiac exam regular rate and rhythm.  PMI  not displaced.  Abdomen soft nontender nondistended no organomegaly.  Pelvis stable  Extremities Free of cyanosis clubbing or edema.  No evidence of DVT.  No joint deformity no spinal tenderness no rib tenderness no clavicular tenderness  Neurological examination nonfocal normal speech moves extremities well  Psychiatric normal mood affect and mentation.  Skin no lesions noted no subcutaneous emphysema.  No rash.    1.  Iatrogenic pneumothorax secondary to pacemaker placement pneumothorax is not excessive and is stable over 24-hour period.  I am okay with discharging the patient home provided that he follows up either with pulmonary medicine cardiology or thoracic surgery within the next several days.  He is instructed to return to the hospital should his symptoms worsen.  2.  History of COPD.  Is clinically stable.  3.  History of obstructive sleep apnea.  Do not recommend using CPAP while his pneumothorax has been acutely formed.  4.  Moderate obesity.  This is not a contraindication to placing the chest tube.  Case discussed extensively with cardiology and with the patient and his family.  They would prefer to follow-up with cardiology in several days.  As again I explained to the patient the importance of following up sooner should he develop symptoms.  Also possible the patient may developed a pleural effusion secondary to the space.  Therefore follow-up chest x-ray should be performed.

## 2019-04-20 NOTE — DISCHARGE INSTR - APPOINTMENTS
Please call Dr. Moore's office for a 4-6 week follow-up appointment. When you call to make this appointment, let them know that you had a pacemaker placed this week. They will need to schedule a time for you to come in and have your site checked within the next 7-10 days.

## 2019-04-20 NOTE — PROGRESS NOTES
Cardiology/Electrophysiology Progress Note      Patient Name: Godfrey Shah  Age/Sex: 73 y.o. male  : 1945  MRN: [id number]      Day of Service: 19       Chief Complaint/Follow-up:     Interval History:     S: Patient seen and examined. Tele Reviewed. No acute issues.        Temp:  [97.5 °F (36.4 °C)-98.6 °F (37 °C)] 98.5 °F (36.9 °C)  Heart Rate:  [60-63] 63  Resp:  [16-20] 20  BP: (118-149)/(62-74) 149/73     AO x 3, NAD  Eyes PERRL, EOMI  Neck supple. No JVD  CTA  RRR  BS intact  DP intact. No edema  Skin warm and dry. Incision intact and well-healed with no signs of infection.        ECG/TELE: Reviewed.          Results from last 7 days   Lab Units 19  0308 19  1100 19  0311   SODIUM mmol/L 140 135* 137   POTASSIUM mmol/L 3.8 4.1 3.8   CHLORIDE mmol/L 106 103 102   CO2 mmol/L 22.4 19.5* 23.5   BUN mg/dL 17 15 15   CREATININE mg/dL 0.92 0.91 0.86   GLUCOSE mg/dL 148* 104* 141*   CALCIUM mg/dL 8.4* 8.8 8.1*     Results from last 7 days   Lab Units 19  0308 19  1100 19  0311   WBC 10*3/mm3 6.08 5.89 4.88   HEMOGLOBIN g/dL 14.3 15.5 14.3   HEMATOCRIT % 43.3 47.8 44.4   PLATELETS 10*3/mm3 117* 125* 125*     Results from last 7 days   Lab Units 19  0308 04/15/19  2302   INR  1.05 1.04       Current Medications:   Scheduled Meds:  aspirin 81 mg Oral Daily   atorvastatin 20 mg Oral Nightly   cetirizine 10 mg Oral Daily   clopidogrel 75 mg Oral Daily   losartan 25 mg Oral Daily   mirtazapine 7.5 mg Oral Nightly   pantoprazole 40 mg Oral Q AM   QUEtiapine 50 mg Oral Nightly   sodium chloride 3 mL Intravenous Q12H     Continuous Infusions:         AV block    Bradycardia, sinus    Sleep-disordered breathing    Symptomatic bradycardia       A/P: 72 yo male with Symptomatic Bradycardia.     1. Symptomatic Bradycardia - in patient with baseline High degree AV Block with RBBB - now with intermittent 2:1 Heart Block - Mobitz Type 2 - block below the His - ECHO with  preserved LV Function. Symptomatic with the Bradycardia. Episodes of bradycardia with LHC. Has CAD - intolerant of BB. Has multiple indications (Class I) for pacemaker implantation including Symptomatic Bradycardia, Symptomatic Mobitz Type 2 with Block below the His as well as intolerance of BB in patient with a history of Known CAD. Underwent Pacemaker implantation.    2. S/p PPM - normal function.     3. H/o CAD - LCx lesion - ASA and Plavix - PCI as outpatient per Dr. Naranjo.    4. PTX - originally in base of lung now apical - I doubt secondary to procedure - likely a bleb that probably burst - patient with history of COPD. Awaiting Thoracic input.      Curtis Moore DO  04/20/19  1:27 PM

## 2019-04-20 NOTE — DISCHARGE SUMMARY
Date of Discharge:  4/20/2019    Discharge Diagnosis:   1. Symptomatic Heart Block  2. Symptomatic Bradycardia  3. S/p Pacemaker  4. CAD  5. COPD  6. PTX    Presenting Problem/History of Present Illness  Symptomatic bradycardia [R00.1]  Symptomatic bradycardia [R00.1]  AV block [I44.30]     Patient Active Problem List   Diagnosis   • Bradycardia, sinus   • Sleep-disordered breathing   • Symptomatic bradycardia   • AV block        I was asked by Dr. Srivastava to evaluate this very pleasant 74 yo white male for consideration of Device Therapy. Godfrey has a past medical history of HTN, former tobacco abuse, COPD/Asthma, RYAN (noncompliant with CPAP) who had presented originally to the ER after he had called his PCP and mentioned that he was feeling light-headed and dizzy while on the Treadmill with recorded HRs in the 30 bpm range on his FitBit - he could not get his heart rate elevated while walking. He had recently been started on some HTN meds - cannot remember - but stopped them due to dizziness. He was admitted at [facility] and underwent a stress test that showed ST segment depression and second degree AV block. He also had mentioned symptomatic episodes of bradycardia. He underwent LHC Today - during cardiac cath developed high grade AV block as well. Cardiac cath with calcified 80% mid LCX and 80% inferior division of OM2. With ITALIA 3 flow. LCX is a dominant vessel. No intervention was done. Baseline ECG shows RBBB with prolonged AR interval. He has no family history of premature CAD, SCD, nor Arrhythmias.     Hospital Course   1. Symptomatic Bradycardia - in patient with baseline High degree AV Block with RBBB - now with intermittent 2:1 Heart Block - Mobitz Type 2 - block below the His - ECHO with preserved LV Function. Symptomatic with the Bradycardia. Episodes of bradycardia with LHC. Has CAD - intolerant of BB. Has multiple indications (Class I) for pacemaker implantation including Symptomatic Bradycardia,  Symptomatic Mobitz Type 2 with Block below the His as well as intolerance of BB in patient with a history of Known CAD. Underwent Pacemaker implantation.     2. S/p PPM - normal function.      3. H/o CAD - LCx lesion - ASA and Plavix - PCI as outpatient per Dr. Naranjo.     4. PTX - originally in base of lung now apical - I doubt secondary to procedure - likely a bleb that probably burst - patient with history of COPD. Thoracic saw the patient - felt stable for DC with repeat CXR next week.    5. COPD - Pulmonary consulted.           Procedures Performed  Procedure(s):  Pacemaker DC new       Consults:   Consults     Date and Time Order Name Status Description    4/19/2019 1714 Inpatient Thoracic Surgery Consult      4/17/2019 1200 Inpatient Cardiology Consult Completed     4/16/2019 0037 Inpatient Sleep Medicine Consult Completed           Pertinent Test Results:   Ejection Fraction  No results found for: EF    Condition on Discharge:  Stable    Physical Exam at Discharge    Vital Signs  Temp:  [97.5 °F (36.4 °C)-98.6 °F (37 °C)] 98.5 °F (36.9 °C)  Heart Rate:  [60-63] 63  Resp:  [16-20] 20  BP: (118-149)/(62-74) 149/73  Wt Readings from Last 4 Encounters:   04/20/19 86 kg (189 lb 9.6 oz)   01/06/17 94.8 kg (209 lb)   09/02/16 90.3 kg (199 lb)   08/18/16 94.3 kg (208 lb)      General Appearance:    Alert, cooperative, in no acute distress   Head:    Normocephalic, without obvious abnormality, atraumatic   Eyes:            Conjunctivae normal, no   icterus   Neck:   No adenopathy, supple, trachea midline, no thyromegaly, no   carotid bruit, no JVD   Lungs:     Clear to auscultation,respirations regular, even and                  unlabored    Heart:    Regular rhythm and normal rate, normal S1 and S2,            No murmur, no gallop, no rub, no click   Chest Wall:    No abnormalities observed   Abdomen:     Normal bowel sounds, no masses, no organomegaly, soft        non-tender, non-distended, no guarding, no rebound                 tenderness   Rectal:     Deferred   Extremities:   No edema. Moves all extremities well, no cyanosis, no erythema   Pulses:   Pulses palpable and equal bilaterally   Skin:   No bleeding, bruising or rash   Neurologic:   Cranial nerves 2 - 12 grossly intact, sensation intact, DTR       present and equal bilaterally          Discharge Disposition  Home or Self Care    Discharge Medications     Discharge Medications      New Medications      Instructions Start Date   aspirin 81 MG EC tablet   81 mg, Oral, Daily   Start Date:  4/21/2019     atorvastatin 20 MG tablet  Commonly known as:  LIPITOR   20 mg, Oral, Nightly      clopidogrel 75 MG tablet  Commonly known as:  PLAVIX   75 mg, Oral, Daily   Start Date:  4/21/2019     nitroglycerin 0.4 MG SL tablet  Commonly known as:  NITROSTAT   0.4 mg, Sublingual, Every 5 Minutes PRN      oxyCODONE-acetaminophen 5-325 MG per tablet  Commonly known as:  PERCOCET   1 tablet, Oral, Every 4 Hours PRN      zolpidem 5 MG tablet  Commonly known as:  AMBIEN   Bring to sleep lab DO NOT use at home. PLEASE take if not asleep within 30 mins of start of study.         Continue These Medications      Instructions Start Date   cetirizine 10 MG tablet  Commonly known as:  zyrTEC   10 mg, Oral, Daily      losartan 25 MG tablet  Commonly known as:  COZAAR   25 mg, Oral, Daily      mirtazapine 7.5 MG tablet  Commonly known as:  REMERON   7.5 mg, Oral, Nightly      PROTONIX 40 MG pack packet  Generic drug:  pantoprazole   Oral      QUEtiapine 50 MG tablet  Commonly known as:  SEROquel   50 mg, Oral, Nightly      VENTOLIN  (90 Base) MCG/ACT inhaler  Generic drug:  albuterol sulfate HFA   INL 2 PFS PO Q 4 H PRN             Discharge Diet: Cardiac    Activity at Discharge: As tolerated    Follow-up Appointments  Future Appointments   Date Time Provider Department Center   5/26/2019  8:30 PM [facility] SLEEP [facility]     FU with Dr. Moore next Wednesday at [place]  [facility].  FU with Pulmonary.    Test Results at Discharge  Lab Results   Component Value Date    GLUCOSE 148 (H) 04/19/2019    CALCIUM 8.4 (L) 04/19/2019     04/19/2019    K 3.8 04/19/2019    CO2 22.4 04/19/2019     04/19/2019    BUN 17 04/19/2019    CREATININE 0.92 04/19/2019    EGFRIFNONA 81 04/19/2019    BCR 18.5 04/19/2019    ANIONGAP 11.6 04/19/2019        Lab Results   Component Value Date    GLUCOSE 148 (H) 04/19/2019    BUN 17 04/19/2019    CREATININE 0.92 04/19/2019    EGFRIFNONA 81 04/19/2019    BCR 18.5 04/19/2019    CO2 22.4 04/19/2019    CALCIUM 8.4 (L) 04/19/2019    ALBUMIN 3.80 04/15/2019    AST 15 04/15/2019    ALT 11 04/15/2019        No results found for: TROPONINT, MG    Lab Results   Component Value Date    WBC 6.08 04/19/2019    HGB 14.3 04/19/2019    HCT 43.3 04/19/2019    MCV 98.4 (H) 04/19/2019     (L) 04/19/2019      Lab Results   Component Value Date    CHOL 149 04/18/2019    CHOL 154 04/16/2019     Lab Results   Component Value Date    HDL 32 (L) 04/18/2019    HDL 30 (L) 04/16/2019     Lab Results   Component Value Date    LDL 91 04/18/2019    LDL 94 04/16/2019     Lab Results   Component Value Date    TRIG 130 04/18/2019    TRIG 148 04/16/2019     No components found for: CHOLHDL   Lab Results   Component Value Date    HGBA1C 5.64 (H) 04/15/2019      Lab Results   Component Value Date    TSH 1.030 04/15/2019           Curtis Moore DO  04/20/19  1:44 PM    Time: > 75 minutes spent at time of DC.

## 2019-04-21 ENCOUNTER — READMISSION MANAGEMENT (OUTPATIENT)
Dept: CALL CENTER | Facility: HOSPITAL | Age: 74
End: 2019-04-21

## 2019-04-21 NOTE — OUTREACH NOTE
Prep Survey      Responses   Facility patient discharged from?  [facility]   Is patient eligible?  Yes   Discharge diagnosis  Symptomatic HB, symptomatic bradycardia, s/p PPM, CAD, COPD, PTX   Does the patient have one of the following disease processes/diagnoses(primary or secondary)?  General Surgery   Does the patient have Home health ordered?  No   Is there a DME ordered?  No   Comments regarding appointments  See AVS   Prep survey completed?  Yes          Juliana Schmitz RN

## 2019-04-22 ENCOUNTER — HOSPITAL ENCOUNTER (OUTPATIENT)
Dept: GENERAL RADIOLOGY | Facility: HOSPITAL | Age: 74
Discharge: HOME | End: 2019-04-22
Admitting: INTERNAL MEDICINE

## 2019-04-22 DIAGNOSIS — J93.9 PNEUMOTHORAX, UNSPECIFIED TYPE: ICD-10-CM

## 2019-04-22 PROCEDURE — 71046 X-RAY EXAM CHEST 2 VIEWS: CPT

## 2019-04-23 ENCOUNTER — READMISSION MANAGEMENT (OUTPATIENT)
Dept: CALL CENTER | Facility: HOSPITAL | Age: 74
End: 2019-04-23

## 2019-04-23 NOTE — OUTREACH NOTE
General Surgery Week 1 Survey      Responses   Facility patient discharged from?  [facility]   Does the patient have one of the following disease processes/diagnoses(primary or secondary)?  General Surgery   Is there a successful TCM telephone encounter documented?  No   Week 1 attempt successful?  Yes   Call start time  1715   Call end time  1719   Is patient permission given to speak with other caregiver?  Yes   Medication alerts for this patient  review medications   Meds reviewed with patient/caregiver?  Yes   Is the patient having any side effects they believe may be caused by any medication additions or changes?  No   Does the patient have all medications related to this admission filled (includes all antibiotics, pain medications, etc.)  Yes   Is the patient taking all medications as directed (includes completed medication regime)?  Yes   Medication comments  not taking the ambien until a sleep study is done   Does the patient have a follow up appointment scheduled with their surgeon?  Yes   Has the patient kept scheduled appointments due by today?  Yes   Comments  keeping appointment Dr. Acevedo   Has [place] [facility] visited the patient within 72 hours of discharge?  N/A   Did the patient receive a copy of their discharge instructions?  Yes   Nursing interventions  Reviewed instructions with patient   What is the patient's perception of their health status since discharge?  Improving   Is the patient /caregiver able to teach back basic post-op care?  Drive as instructed by MD in discharge instructions, Take showers only when approved by MD-sponge bathe until then, No tub bath, swimming, or hot tub until instructed by MD, Lifting as instructed by MD in discharge instructions   Is the patient/caregiver able to teach back signs and symptoms of incisional infection?  Increased drainage or bleeding, Incisional warmth, Pus or odor from incision, Fever   Is the patient/caregiver able to teach back steps to recovery at  home?  Set small, achievable goals for return to baseline health, Rest and rebuild strength, gradually increase activity, Eat a well-balance diet   Is the patient/caregiver able to teach back the hierarchy of who to call/visit for symptoms/problems? PCP, Specialist, Home health nurse, Urgent Care, ED, 911  Yes   Week 1 call completed?  Yes   Wrap up additional comments  -- [doing well ]          Kristy Wise RN

## 2019-04-30 ENCOUNTER — READMISSION MANAGEMENT (OUTPATIENT)
Dept: CALL CENTER | Facility: HOSPITAL | Age: 74
End: 2019-04-30

## 2019-04-30 NOTE — OUTREACH NOTE
General Surgery Week 2 Survey      Responses   Facility patient discharged from?  [place]   Does the patient have one of the following disease processes/diagnoses(primary or secondary)?  General Surgery   Week 2 attempt successful?  Yes   Call start time  1414   Revoke  Readmitted [Currently having stent placement at [facility]]          Blossom Valera RN   Abdomen soft, nontender, nondistended, bowel sounds present in all 4 quadrants.

## 2019-06-20 ENCOUNTER — HOSPITAL ENCOUNTER (OUTPATIENT)
Dept: GENERAL RADIOLOGY | Facility: HOSPITAL | Age: 74
Discharge: HOME | End: 2019-06-20
Admitting: INTERNAL MEDICINE

## 2019-06-20 DIAGNOSIS — J90 PLEURAL EFFUSION: ICD-10-CM

## 2019-06-20 PROCEDURE — 71046 X-RAY EXAM CHEST 2 VIEWS: CPT

## 2019-07-21 ENCOUNTER — APPOINTMENT (OUTPATIENT)
Dept: CT IMAGING | Facility: HOSPITAL | Age: 74
End: 2019-07-21

## 2019-07-21 ENCOUNTER — HOSPITAL ENCOUNTER (EMERGENCY)
Facility: HOSPITAL | Age: 74
Discharge: HOME | End: 2019-07-21
Attending: EMERGENCY MEDICINE | Admitting: EMERGENCY MEDICINE

## 2019-07-21 VITALS
RESPIRATION RATE: 17 BRPM | HEIGHT: 68 IN | TEMPERATURE: 97.3 F | HEART RATE: 60 BPM | BODY MASS INDEX: 31.33 KG/M2 | SYSTOLIC BLOOD PRESSURE: 141 MMHG | WEIGHT: 206.7 LBS | OXYGEN SATURATION: 91 % | DIASTOLIC BLOOD PRESSURE: 79 MMHG

## 2019-07-21 DIAGNOSIS — H81.399 PERIPHERAL VERTIGO, UNSPECIFIED LATERALITY: Primary | ICD-10-CM

## 2019-07-21 LAB
ALBUMIN SERPL-MCNC: 4.3 G/DL (ref 3.5–5.2)
ALBUMIN/GLOB SERPL: 1.6 G/DL
ALP SERPL-CCNC: 101 U/L (ref 39–117)
ALT SERPL W P-5'-P-CCNC: 23 U/L (ref 1–41)
ANION GAP SERPL CALCULATED.3IONS-SCNC: 10 MMOL/L (ref 5–15)
AST SERPL-CCNC: 25 U/L (ref 1–40)
BASOPHILS # BLD AUTO: 0.01 10*3/MM3 (ref 0–0.2)
BASOPHILS NFR BLD AUTO: 0.2 % (ref 0–1.5)
BILIRUB SERPL-MCNC: 1.2 MG/DL (ref 0.2–1.2)
BUN BLD-MCNC: 13 MG/DL (ref 8–23)
BUN/CREAT SERPL: 13.5 (ref 7–25)
CALCIUM SPEC-SCNC: 8.9 MG/DL (ref 8.6–10.5)
CHLORIDE SERPL-SCNC: 103 MMOL/L (ref 98–107)
CO2 SERPL-SCNC: 25 MMOL/L (ref 22–29)
CREAT BLD-MCNC: 0.96 MG/DL (ref 0.76–1.27)
DEPRECATED RDW RBC AUTO: 44.6 FL (ref 37–54)
EOSINOPHIL # BLD AUTO: 0.04 10*3/MM3 (ref 0–0.4)
EOSINOPHIL NFR BLD AUTO: 0.8 % (ref 0.3–6.2)
ERYTHROCYTE [DISTWIDTH] IN BLOOD BY AUTOMATED COUNT: 12.4 % (ref 12.3–15.4)
GFR SERPL CREATININE-BSD FRML MDRD: 77 ML/MIN/1.73
GLOBULIN UR ELPH-MCNC: 2.7 GM/DL
GLUCOSE BLD-MCNC: 125 MG/DL (ref 65–99)
HCT VFR BLD AUTO: 45.8 % (ref 37.5–51)
HGB BLD-MCNC: 15.2 G/DL (ref 13–17.7)
HOLD SPECIMEN: NORMAL
HOLD SPECIMEN: NORMAL
LYMPHOCYTES # BLD AUTO: 1.52 10*3/MM3 (ref 0.7–3.1)
LYMPHOCYTES NFR BLD AUTO: 32.2 % (ref 19.6–45.3)
MCH RBC QN AUTO: 32.3 PG (ref 26.6–33)
MCHC RBC AUTO-ENTMCNC: 33.2 G/DL (ref 31.5–35.7)
MCV RBC AUTO: 97.2 FL (ref 79–97)
MONOCYTES # BLD AUTO: 0.3 10*3/MM3 (ref 0.1–0.9)
MONOCYTES NFR BLD AUTO: 6.4 % (ref 5–12)
NEUTROPHILS # BLD AUTO: 2.84 10*3/MM3 (ref 1.7–7)
NEUTROPHILS NFR BLD AUTO: 60.2 % (ref 42.7–76)
PLATELET # BLD AUTO: 115 10*3/MM3 (ref 140–450)
PMV BLD AUTO: 10 FL (ref 6–12)
POTASSIUM BLD-SCNC: 4.3 MMOL/L (ref 3.5–5.2)
PROT SERPL-MCNC: 7 G/DL (ref 6–8.5)
RBC # BLD AUTO: 4.71 10*6/MM3 (ref 4.14–5.8)
SODIUM BLD-SCNC: 138 MMOL/L (ref 136–145)
TROPONIN T SERPL-MCNC: <0.01 NG/ML (ref 0–0.03)
WBC NRBC COR # BLD: 4.72 10*3/MM3 (ref 3.4–10.8)
WHOLE BLOOD HOLD SPECIMEN: NORMAL
WHOLE BLOOD HOLD SPECIMEN: NORMAL

## 2019-07-21 PROCEDURE — 80053 COMPREHEN METABOLIC PANEL: CPT | Performed by: EMERGENCY MEDICINE

## 2019-07-21 PROCEDURE — 96374 THER/PROPH/DIAG INJ IV PUSH: CPT

## 2019-07-21 PROCEDURE — 25010000002 LORAZEPAM PER 2 MG: Performed by: EMERGENCY MEDICINE

## 2019-07-21 PROCEDURE — 70450 CT HEAD/BRAIN W/O DYE: CPT

## 2019-07-21 PROCEDURE — 84484 ASSAY OF TROPONIN QUANT: CPT | Performed by: EMERGENCY MEDICINE

## 2019-07-21 PROCEDURE — 99283 EMERGENCY DEPT VISIT LOW MDM: CPT

## 2019-07-21 PROCEDURE — 93005 ELECTROCARDIOGRAM TRACING: CPT | Performed by: EMERGENCY MEDICINE

## 2019-07-21 PROCEDURE — 85025 COMPLETE CBC W/AUTO DIFF WBC: CPT | Performed by: EMERGENCY MEDICINE

## 2019-07-21 PROCEDURE — 93010 ELECTROCARDIOGRAM REPORT: CPT | Performed by: INTERNAL MEDICINE

## 2019-07-21 RX ORDER — SODIUM CHLORIDE 0.9 % (FLUSH) 0.9 %
10 SYRINGE (ML) INJECTION AS NEEDED
Status: DISCONTINUED | OUTPATIENT
Start: 2019-07-21 | End: 2019-07-21 | Stop reason: HOSPADM

## 2019-07-21 RX ORDER — MECLIZINE HYDROCHLORIDE 25 MG/1
25 TABLET ORAL 3 TIMES DAILY PRN
Qty: 15 TABLET | Refills: 0 | Status: SHIPPED | OUTPATIENT
Start: 2019-07-21 | End: 2022-04-21 | Stop reason: SDUPTHER

## 2019-07-21 RX ORDER — LORAZEPAM 1 MG/1
1 TABLET ORAL EVERY 8 HOURS PRN
Qty: 5 TABLET | Refills: 0 | Status: SHIPPED | OUTPATIENT
Start: 2019-07-21 | End: 2019-08-06

## 2019-07-21 RX ORDER — MECLIZINE HYDROCHLORIDE 25 MG/1
25 TABLET ORAL ONCE
Status: COMPLETED | OUTPATIENT
Start: 2019-07-21 | End: 2019-07-21

## 2019-07-21 RX ORDER — LORAZEPAM 2 MG/ML
0.5 INJECTION INTRAMUSCULAR ONCE
Status: COMPLETED | OUTPATIENT
Start: 2019-07-21 | End: 2019-07-21

## 2019-07-21 RX ADMIN — LORAZEPAM 0.5 MG: 2 INJECTION INTRAMUSCULAR; INTRAVENOUS at 14:06

## 2019-07-21 RX ADMIN — MECLIZINE HYDROCHLORIDE 25 MG: 25 TABLET ORAL at 14:06

## 2019-08-05 PROBLEM — C61 PROSTATE CANCER: Status: ACTIVE | Noted: 2017-05-01

## 2019-08-05 RX ORDER — TICAGRELOR 90 MG/1
90 TABLET ORAL 2 TIMES DAILY
Refills: 11 | COMMUNITY
Start: 2019-05-01 | End: 2019-08-05

## 2019-08-05 RX ORDER — PANTOPRAZOLE SODIUM 40 MG/1
40 TABLET, DELAYED RELEASE ORAL DAILY
Refills: 3 | COMMUNITY
Start: 2019-05-04 | End: 2020-06-05 | Stop reason: SDUPTHER

## 2019-08-06 ENCOUNTER — OFFICE VISIT (OUTPATIENT)
Dept: FAMILY MEDICINE CLINIC | Facility: CLINIC | Age: 74
End: 2019-08-06

## 2019-08-06 VITALS
DIASTOLIC BLOOD PRESSURE: 80 MMHG | SYSTOLIC BLOOD PRESSURE: 128 MMHG | WEIGHT: 193 LBS | HEART RATE: 63 BPM | HEIGHT: 68 IN | BODY MASS INDEX: 29.25 KG/M2 | OXYGEN SATURATION: 95 % | TEMPERATURE: 98 F

## 2019-08-06 DIAGNOSIS — I25.10 CORONARY ARTERY DISEASE INVOLVING NATIVE CORONARY ARTERY OF NATIVE HEART WITHOUT ANGINA PECTORIS: Primary | ICD-10-CM

## 2019-08-06 DIAGNOSIS — F51.01 PRIMARY INSOMNIA: ICD-10-CM

## 2019-08-06 DIAGNOSIS — K21.9 GASTROESOPHAGEAL REFLUX DISEASE WITHOUT ESOPHAGITIS: ICD-10-CM

## 2019-08-06 PROCEDURE — 99214 OFFICE O/P EST MOD 30 MIN: CPT | Performed by: FAMILY MEDICINE

## 2019-08-06 RX ORDER — LOSARTAN POTASSIUM 50 MG/1
50 TABLET ORAL DAILY
Qty: 90 TABLET | Refills: 3 | Status: SHIPPED | OUTPATIENT
Start: 2019-08-06 | End: 2020-01-28 | Stop reason: ALTCHOICE

## 2019-08-06 NOTE — PROGRESS NOTES
Chief Complaint   Patient presents with   • Heartburn   • Hyperlipidemia   • Hypertension   • Anxiety       Subjective   Godfrey Shah is a 74 y.o. male.     Pt went to [facility] ER two weeks ago for vertigo       Heartburn   He reports no abdominal pain, no chest pain, no sore throat or no wheezing. Pertinent negatives include no fatigue.   Hyperlipidemia   Pertinent negatives include no chest pain, myalgias or shortness of breath.   Hypertension   Associated symptoms include anxiety. Pertinent negatives include no blurred vision, chest pain or shortness of breath.   Anxiety   Patient reports no chest pain, confusion, depressed mood or shortness of breath.       F/U ANTOINETTE.  Doing well with meds.    F/U HTN.  No orthostasis.  On meds regularly.      F/U insomnia.  Doing well with seroquel 50 at night.  No SE.   FU hyperlipidemia.  LDL 91 4/19.  On atorvastatin w/o SE.       The following portions of the patient's history were reviewed and updated as appropriate: allergies, current medications, past family history, past medical history, past social history, past surgical history and problem list.    Review of Systems   Constitutional: Negative for appetite change and fatigue.   HENT: Negative for nosebleeds and sore throat.    Eyes: Negative for blurred vision and visual disturbance.   Respiratory: Negative for shortness of breath and wheezing.    Cardiovascular: Negative for chest pain and leg swelling.   Gastrointestinal: Negative for abdominal distention and abdominal pain.   Endocrine: Negative for cold intolerance and polyuria.   Genitourinary: Negative for dysuria and hematuria.   Musculoskeletal: Negative for arthralgias and myalgias.   Skin: Negative for color change and rash.   Neurological: Negative for weakness and confusion.   Psychiatric/Behavioral: Negative for agitation and depressed mood.       Patient Active Problem List   Diagnosis   • Bradycardia, sinus   • Sleep-disordered breathing   • Symptomatic  bradycardia   • AV block   • Prostate cancer (CMS/HCC)   • Pacemaker   • Insomnia   • RYAN (obstructive sleep apnea)   • Nephrolithiasis   • Hyperglycemia   • GERD (gastroesophageal reflux disease)   • Osteoarthritis   • COPD (chronic obstructive pulmonary disease) (CMS/HCC)   • Inability to attain erection   • Coronary artery disease       No Known Allergies      Current Outpatient Medications:   •  aspirin 81 MG EC tablet, Take 1 tablet by mouth Daily., Disp: 30 tablet, Rfl: 5  •  atorvastatin (LIPITOR) 20 MG tablet, Take 1 tablet by mouth Every Night., Disp: 30 tablet, Rfl: 5  •  cetirizine (ZyrTEC) 10 MG tablet, Take 10 mg by mouth daily., Disp: , Rfl:   •  clopidogrel (PLAVIX) 75 MG tablet, Take 1 tablet by mouth Daily., Disp: 30 tablet, Rfl: 5  •  losartan (COZAAR) 25 MG tablet, Take 25 mg by mouth Daily., Disp: , Rfl:   •  meclizine (ANTIVERT) 25 MG tablet, Take 1 tablet by mouth 3 (Three) Times a Day As Needed for dizziness., Disp: 15 tablet, Rfl: 0  •  nitroglycerin (NITROSTAT) 0.4 MG SL tablet, Place 1 tablet under the tongue Every 5 (Five) Minutes As Needed for Chest Pain (Chest Pain With Systolic Blood Pressure Greater Than 100)., Disp: 30 tablet, Rfl: 12  •  pantoprazole (PROTONIX) 40 MG EC tablet, Take 40 mg by mouth Daily., Disp: , Rfl: 3  •  QUEtiapine (SEROquel) 50 MG tablet, Take 50 mg by mouth Every Night., Disp: , Rfl:   •  VENTOLIN  (90 BASE) MCG/ACT inhaler, INL 2 PFS PO Q 4 H PRN, Disp: , Rfl: 3    Past Medical History:   Diagnosis Date   • Acute bronchitis    • Allergic rhinitis    • Anxiety    • Arm mass, right    • ASCVD (arteriosclerotic cardiovascular disease)    • AV block    • Biceps muscle tear    • BPPV (benign paroxysmal positional vertigo)    • Bradycardia    • CAD (coronary artery disease)    • Colon polyps    • COPD (chronic obstructive pulmonary disease) (CMS/HCC)    • COPD exacerbation (CMS/[facility])    • Coronary artery disease    • Depression with anxiety    • Eczema    •  Elevated PSA    • GERD (gastroesophageal reflux disease)    • Heart block    • Hemorrhoids    • Hyperglycemia    • Hyperlipidemia    • Hypertension    • Inability to attain erection    • Insomnia    • Nephrolithiasis    • Obstructive chronic bronchitis (CMS/[facility])    • RYAN (obstructive sleep apnea)    • Osteoarthritis    • Other fatigue    • Pacemaker    • Prostate cancer (CMS/HCC) 05/2017   • Prostate cancer (CMS/[facility])    • Rotator cuff tendonitis, right    • Syncope        Past Surgical History:   Procedure Laterality Date   • CARDIAC CATHETERIZATION N/A 4/17/2019    Procedure: LEFT HEART CATH;  Surgeon: Lalo Naranjo MD;  Location:  [facility];  Service: Cardiovascular   • CARDIAC CATHETERIZATION N/A 4/17/2019    Procedure: CORONARY ANGIOGRAPHY;  Surgeon: Lalo Naranjo MD;  Location:  [facility];  Service: Cardiovascular   • CARDIAC CATHETERIZATION N/A 4/17/2019    Procedure: Left ventriculography;  Surgeon: Lalo Naranjo MD;  Location:  [facility];  Service: Cardiovascular   • CARDIAC ELECTROPHYSIOLOGY PROCEDURE Left 4/18/2019    Procedure: Pacemaker DC new;  Surgeon: Curtis Moore DO;  Location:  [facility];  Service: Cardiovascular   • CORONARY ANGIOPLASTY WITH STENT PLACEMENT     • PACEMAKER IMPLANTATION     • PENILE PROSTHESIS IMPLANT     • PENIS SURGERY      Penile implant   • PROSTATE BIOPSY  01/2015    histroy of needle biopsy of prostate - normal biopsy       Family History   Problem Relation Age of Onset   • No Known Problems Mother    • No Known Problems Father        Social History     Tobacco Use   • Smoking status: Former Smoker   • Smokeless tobacco: Never Used   • Tobacco comment: Quit 2000   Substance Use Topics   • Alcohol use: No     Frequency: Never            Objective     Vitals:    08/06/19 0814   BP: 128/80   Pulse:    Temp:    SpO2:      Body mass index is 29.35 kg/m².    Physical Exam   Constitutional:  He is oriented to person, place, and time. He appears well-developed and well-nourished.   HENT:   Head: Normocephalic and atraumatic.   Right Ear: External ear normal.   Left Ear: External ear normal.   Nose: Nose normal.   Mouth/Throat: Oropharynx is clear and moist.   Eyes: Conjunctivae and EOM are normal. Pupils are equal, round, and reactive to light.   Neck: Normal range of motion. Neck supple. No tracheal deviation present. No thyromegaly present.   Cardiovascular: Normal rate, regular rhythm and normal heart sounds. Exam reveals no gallop and no friction rub.   No murmur heard.  Pulmonary/Chest: Effort normal and breath sounds normal. No respiratory distress. He exhibits no tenderness.   Abdominal: Soft. Bowel sounds are normal. He exhibits no distension. There is no tenderness.   Musculoskeletal: Normal range of motion. He exhibits no edema or tenderness.   Lymphadenopathy:     He has no cervical adenopathy.   Neurological: He is alert and oriented to person, place, and time. He displays normal reflexes. No cranial nerve deficit or sensory deficit. Coordination normal.   Skin: Skin is warm and dry.   Psychiatric: He has a normal mood and affect. His behavior is normal. Judgment and thought content normal.   Nursing note and vitals reviewed.      Lab Results   Component Value Date    GLUCOSE 125 (H) 07/21/2019    BUN 13 07/21/2019    CREATININE 0.96 07/21/2019    EGFRIFNONA 77 07/21/2019    BCR 13.5 07/21/2019    K 4.3 07/21/2019    CO2 25.0 07/21/2019    CALCIUM 8.9 07/21/2019    ALBUMIN 4.30 07/21/2019    AST 25 07/21/2019    ALT 23 07/21/2019       WBC   Date Value Ref Range Status   07/21/2019 4.72 3.40 - 10.80 10*3/mm3 Final     RBC   Date Value Ref Range Status   07/21/2019 4.71 4.14 - 5.80 10*6/mm3 Final     Hemoglobin   Date Value Ref Range Status   07/21/2019 15.2 13.0 - 17.7 g/dL Final     Hematocrit   Date Value Ref Range Status   07/21/2019 45.8 37.5 - 51.0 % Final     MCV   Date Value Ref Range  Status   07/21/2019 97.2 (H) 79.0 - 97.0 fL Final     MCH   Date Value Ref Range Status   07/21/2019 32.3 26.6 - 33.0 pg Final     MCHC   Date Value Ref Range Status   07/21/2019 33.2 31.5 - 35.7 g/dL Final     RDW   Date Value Ref Range Status   07/21/2019 12.4 12.3 - 15.4 % Final     RDW-SD   Date Value Ref Range Status   07/21/2019 44.6 37.0 - 54.0 fl Final     MPV   Date Value Ref Range Status   07/21/2019 10.0 6.0 - 12.0 fL Final     Platelets   Date Value Ref Range Status   07/21/2019 115 (L) 140 - 450 10*3/mm3 Final     Neutrophil %   Date Value Ref Range Status   07/21/2019 60.2 42.7 - 76.0 % Final     Lymphocyte %   Date Value Ref Range Status   07/21/2019 32.2 19.6 - 45.3 % Final     Monocyte %   Date Value Ref Range Status   07/21/2019 6.4 5.0 - 12.0 % Final     Eosinophil %   Date Value Ref Range Status   07/21/2019 0.8 0.3 - 6.2 % Final     Basophil %   Date Value Ref Range Status   07/21/2019 0.2 0.0 - 1.5 % Final     Immature Grans %   Date Value Ref Range Status   08/18/2016 0.0 0.0 - 0.5 % Final     Neutrophils, Absolute   Date Value Ref Range Status   07/21/2019 2.84 1.70 - 7.00 10*3/mm3 Final     Lymphocytes, Absolute   Date Value Ref Range Status   07/21/2019 1.52 0.70 - 3.10 10*3/mm3 Final     Monocytes, Absolute   Date Value Ref Range Status   07/21/2019 0.30 0.10 - 0.90 10*3/mm3 Final     Eosinophils, Absolute   Date Value Ref Range Status   07/21/2019 0.04 0.00 - 0.40 10*3/mm3 Final     Basophils, Absolute   Date Value Ref Range Status   07/21/2019 0.01 0.00 - 0.20 10*3/mm3 Final     Immature Grans, Absolute   Date Value Ref Range Status   08/18/2016 0.00 0.00 - 0.03 10*3/mm3 Final       Lab Results   Component Value Date    HGBA1C 5.64 (H) 04/15/2019       No results found for: [id number]    TSH   Date Value Ref Range Status   04/15/2019 1.030 0.270 - 4.200 mIU/mL Final       Lab Results   Component Value Date    CHOL 149 04/18/2019     Lab Results   Component Value Date    TRIG 130  04/18/2019     Lab Results   Component Value Date    HDL 32 (L) 04/18/2019     Lab Results   Component Value Date    LDL 91 04/18/2019     Lab Results   Component Value Date    VLDL 26 04/18/2019     Lab Results   Component Value Date    LDLHDL 2.84 04/18/2019         Procedures    Assessment/Plan   Problems Addressed this Visit        Cardiovascular and Mediastinum    Coronary artery disease - Primary       Digestive    GERD (gastroesophageal reflux disease)    Relevant Medications    pantoprazole (PROTONIX) 40 MG EC tablet       Other    Insomnia          No orders of the defined types were placed in this encounter.      Current Outpatient Medications   Medication Sig Dispense Refill   • aspirin 81 MG EC tablet Take 1 tablet by mouth Daily. 30 tablet 5   • atorvastatin (LIPITOR) 20 MG tablet Take 1 tablet by mouth Every Night. 30 tablet 5   • cetirizine (ZyrTEC) 10 MG tablet Take 10 mg by mouth daily.     • clopidogrel (PLAVIX) 75 MG tablet Take 1 tablet by mouth Daily. 30 tablet 5   • losartan (COZAAR) 25 MG tablet Take 25 mg by mouth Daily.     • meclizine (ANTIVERT) 25 MG tablet Take 1 tablet by mouth 3 (Three) Times a Day As Needed for dizziness. 15 tablet 0   • nitroglycerin (NITROSTAT) 0.4 MG SL tablet Place 1 tablet under the tongue Every 5 (Five) Minutes As Needed for Chest Pain (Chest Pain With Systolic Blood Pressure Greater Than 100). 30 tablet 12   • pantoprazole (PROTONIX) 40 MG EC tablet Take 40 mg by mouth Daily.  3   • QUEtiapine (SEROquel) 50 MG tablet Take 50 mg by mouth Every Night.     • VENTOLIN  (90 BASE) MCG/ACT inhaler INL 2 PFS PO Q 4 H PRN  3     No current facility-administered medications for this visit.        Godfrey Shah had no medications administered during this visit.    Return in about 4 months (around 12/6/2019).    There are no Patient Instructions on file for this visit.

## 2019-09-03 ENCOUNTER — TELEPHONE (OUTPATIENT)
Dept: FAMILY MEDICINE CLINIC | Facility: CLINIC | Age: 74
End: 2019-09-03

## 2019-09-03 NOTE — TELEPHONE ENCOUNTER
Patient has been running a low grade temp off & on since last Wednesday & has some congestion, he is concerned that this is turning into pneumonia, please advise because we have no appts until OCT with Renetta

## 2019-09-03 NOTE — TELEPHONE ENCOUNTER
Patient called back because he hadn't heard anything.  He went to the [facility] today so he doesn't need a appointment.

## 2019-10-16 ENCOUNTER — HOSPITAL ENCOUNTER (EMERGENCY)
Facility: HOSPITAL | Age: 74
Discharge: HOME | End: 2019-10-16
Attending: EMERGENCY MEDICINE | Admitting: EMERGENCY MEDICINE

## 2019-10-16 VITALS
OXYGEN SATURATION: 94 % | DIASTOLIC BLOOD PRESSURE: 87 MMHG | HEIGHT: 68 IN | RESPIRATION RATE: 16 BRPM | BODY MASS INDEX: 28.79 KG/M2 | HEART RATE: 60 BPM | WEIGHT: 190 LBS | TEMPERATURE: 98.2 F | SYSTOLIC BLOOD PRESSURE: 146 MMHG

## 2019-10-16 DIAGNOSIS — S91.312A LACERATION OF LEFT FOOT, INITIAL ENCOUNTER: Primary | ICD-10-CM

## 2019-10-16 PROCEDURE — 99283 EMERGENCY DEPT VISIT LOW MDM: CPT

## 2019-10-16 RX ORDER — LIDOCAINE HYDROCHLORIDE AND EPINEPHRINE 10; 10 MG/ML; UG/ML
10 INJECTION, SOLUTION INFILTRATION; PERINEURAL ONCE
Status: COMPLETED | OUTPATIENT
Start: 2019-10-16 | End: 2019-10-16

## 2019-10-16 RX ADMIN — LIDOCAINE HYDROCHLORIDE,EPINEPHRINE BITARTRATE 10 ML: 10; .01 INJECTION, SOLUTION INFILTRATION; PERINEURAL at 23:03

## 2019-10-17 NOTE — ED NOTES
Towels and sock noted tied tightly around pt's foot. All home dressing removed and wound visualized. Pt has a linear laceration to left 5th toe, bleeding controlled. Wound covered with sterile gauze and wrapped in kvng.     Missy eWst RN  10/16/19 [id number]

## 2019-10-17 NOTE — ED PROVIDER NOTES
" EMERGENCY DEPARTMENT ENCOUNTER    CHIEF COMPLAINT  Chief Complaint: laceration   History given by: patient   History limited by: nothing   Room Number: 03/03  PMD: Godfrey Jackson MD      HPI:  Pt is a 74 y.o. male who presents complaining of laceration in web space between 4th and 5th toes on L foot that occurred tonight while he was getting out of the shower. Pt denies any pain. Pt denies numbness/tingling in toe. Pt's TDAP is up to date. There are no other complaints at this time.     Duration: hours   Onset: gradual   Timing: constant   Location: L 5th toe   Radiation: none   Quality: \"laceration\"   Intensity/Severity: moderate   Progression: unchanged   Associated Symptoms: none mentioned   Aggravating Factors: none mentioned   Alleviating Factors: none mentioned   Previous Episodes: none   Treatment before arrival: none     PAST MEDICAL HISTORY  Active Ambulatory Problems     Diagnosis Date Noted   • Bradycardia, sinus 04/15/2019   • Sleep-disordered breathing 04/15/2019   • Symptomatic bradycardia 04/16/2019   • AV block 04/15/2019   • Prostate cancer (CMS/[facility]) 05/01/2017   • Pacemaker    • Insomnia    • RYAN (obstructive sleep apnea)    • Nephrolithiasis    • Hyperglycemia    • GERD (gastroesophageal reflux disease)    • Osteoarthritis    • COPD (chronic obstructive pulmonary disease) (CMS/[facility])    • Inability to attain erection    • Coronary artery disease      Resolved Ambulatory Problems     Diagnosis Date Noted   • No Resolved Ambulatory Problems     Past Medical History:   Diagnosis Date   • Acute bronchitis    • Allergic rhinitis    • Anxiety    • Arm mass, right    • ASCVD (arteriosclerotic cardiovascular disease)    • AV block    • Biceps muscle tear    • BPPV (benign paroxysmal positional vertigo)    • Bradycardia    • CAD (coronary artery disease)    • Colon polyps    • COPD (chronic obstructive pulmonary disease) (CMS/[facility])    • COPD exacerbation (CMS/[facility])    • Coronary artery disease    • " Depression with anxiety    • Eczema    • Elevated PSA    • GERD (gastroesophageal reflux disease)    • Heart block    • Hemorrhoids    • Hyperglycemia    • Hyperlipidemia    • Hypertension    • Inability to attain erection    • Insomnia    • Nephrolithiasis    • Obstructive chronic bronchitis (CMS/[facility])    • RYAN (obstructive sleep apnea)    • Osteoarthritis    • Other fatigue    • Pacemaker    • Prostate cancer (CMS/[facility]) 05/2017   • Prostate cancer (CMS/[facility])    • Rotator cuff tendonitis, right    • Syncope        PAST SURGICAL HISTORY  Past Surgical History:   Procedure Laterality Date   • CARDIAC CATHETERIZATION N/A 4/17/2019    Procedure: LEFT HEART CATH;  Surgeon: Lalo Naranjo MD;  Location:  [facility];  Service: Cardiovascular   • CARDIAC CATHETERIZATION N/A 4/17/2019    Procedure: CORONARY ANGIOGRAPHY;  Surgeon: Lalo Naranjo MD;  Location:  [facility];  Service: Cardiovascular   • CARDIAC CATHETERIZATION N/A 4/17/2019    Procedure: Left ventriculography;  Surgeon: aLlo Naranjo MD;  Location:  [facility];  Service: Cardiovascular   • CARDIAC ELECTROPHYSIOLOGY PROCEDURE Left 4/18/2019    Procedure: Pacemaker DC new;  Surgeon: Curtis Moore DO;  Location:  [facility];  Service: Cardiovascular   • CORONARY ANGIOPLASTY WITH STENT PLACEMENT     • PACEMAKER IMPLANTATION     • PENILE PROSTHESIS IMPLANT     • PENIS SURGERY      Penile implant   • PROSTATE BIOPSY  01/2015    histroy of needle biopsy of prostate - normal biopsy       FAMILY HISTORY  Family History   Problem Relation Age of Onset   • No Known Problems Mother    • No Known Problems Father        SOCIAL HISTORY  Social History     Socioeconomic History   • Marital status:      Spouse name: Not on file   • Number of children: Not on file   • Years of education: Not on file   • Highest education level: Not on file   Tobacco Use   • Smoking status: Former Smoker    • Smokeless tobacco: Never Used   • Tobacco comment: Quit 2000   Substance and Sexual Activity   • Alcohol use: No     Frequency: Never   • Drug use: Defer   • Sexual activity: Defer       ALLERGIES  Patient has no known allergies.    REVIEW OF SYSTEMS  Review of Systems   Constitutional: Negative.    HENT: Negative.    Respiratory: Negative.    Cardiovascular: Negative.    Gastrointestinal: Negative.    Genitourinary: Negative.    Musculoskeletal: Negative.    Skin: Positive for wound (laceration in web space between 4th and 5th toes on L foot).   Neurological: Negative for weakness and numbness.   Psychiatric/Behavioral: Negative.        PHYSICAL EXAM  ED Triage Vitals [10/16/19 2130]   Temp Heart Rate Resp BP SpO2   98.2 °F (36.8 °C) 67 18 -- 93 %      Temp src Heart Rate Source Patient Position BP Location FiO2 (%)   Tympanic Monitor -- -- --       Physical Exam   Constitutional: He is oriented to person, place, and time. No distress.   HENT:   Head: Normocephalic and atraumatic.   Cardiovascular: Intact distal pulses.   Neurological: He is alert and oriented to person, place, and time.   Skin: Skin is warm and dry. Laceration noted.   LAC in web space between 4th and 5th toes on L foot.  No bony tenderness of toes.  Normal sensation.   Psychiatric: Mood and affect normal.   Nursing note and vitals reviewed.        PROCEDURES  Procedures      PROGRESS AND CONSULTS       2220  Discussed pt case with Joaquín Malik PA-C who agrees to perform laceration repair. See his note for further information.      MEDICAL DECISION MAKING  Results were reviewed/discussed with the patient and they were also made aware of online access. Pt also made aware that some labs, such as cultures, will not be resulted during ER visit and follow up with PMD is necessary.     MDM  Number of Diagnoses or Management Options     Amount and/or Complexity of Data Reviewed  Decide to obtain previous medical records or to obtain history from  someone other than the patient: yes  Review and summarize past medical records: yes           DIAGNOSIS  Final diagnoses:   Laceration of left foot, initial encounter       DISPOSITION  Discharge    Latest Documented Vital Signs:  As of 11:06 PM  BP- 154/88 HR- 67 Temp- 98.2 °F (36.8 °C) (Tympanic) O2 sat- 93%    --  Documentation assistance provided by marc Shankar for Dr. Douglass.  Information recorded by the scribe was done at my direction and has been verified and validated by me.     Elham Shankar  10/16/19 3218       Michel Douglass MD  10/16/19 [id number]

## 2019-10-17 NOTE — DISCHARGE INSTRUCTIONS
Have stitches removed in 7 to 8 days.  Return to the emergency department for fever, redness around wound, drainage from wound, or other concern.

## 2019-10-17 NOTE — ED TRIAGE NOTES
Pt reports cutting L baby toe while getting out of shower. Reports taking blood thinners. Foot is currently wrapped, no obvious signs of bleeding noted.

## 2019-10-17 NOTE — ED PROVIDER NOTES
Procedures:  Laceration Repair  Date/Time: 10/16/2019 10:29 PM  Performed by: Joaquín Malik III, PA  Authorized by: Michel Douglass MD     Consent:     Consent obtained:  Verbal    Consent given by:  Patient    Risks discussed:  Pain  Anesthesia (see MAR for exact dosages):     Anesthesia method:  Local infiltration    Local anesthetic:  Lidocaine 1% WITH epi  Laceration details:     Location:  Toe    Toe location: The webbed space between the 4th and 5th digit on the L foot.    Length (cm):  3  Repair type:     Repair type:  Simple  Pre-procedure details:     Preparation:  Patient was prepped and draped in usual sterile fashion  Treatment:     Area cleansed with:  Saline and Hibiclens    Amount of cleaning:  Standard    Irrigation solution:  Sterile saline    Irrigation volume:  1 Liter    Irrigation method:  Syringe    Visualized foreign bodies/material removed: no    Skin repair:     Repair method:  Sutures    Suture size:  4-0    Suture material:  Nylon    Suture technique:  Simple interrupted    Number of sutures:  6  Approximation:     Approximation:  Close  Post-procedure details:     Patient tolerance of procedure:  Tolerated well, no immediate complications              --  Documentation assistance provided by marc Abad for John Malik PA-C.  Information recorded by the scribe was done at my direction and has been verified and validated by me.       Tila Abad  10/16/19 [id number]       Joaquín Malik III, PA  10/17/19 1591

## 2019-10-24 ENCOUNTER — OFFICE VISIT (OUTPATIENT)
Dept: FAMILY MEDICINE CLINIC | Facility: CLINIC | Age: 74
End: 2019-10-24

## 2019-10-24 VITALS
RESPIRATION RATE: 19 BRPM | BODY MASS INDEX: 29.34 KG/M2 | WEIGHT: 193.6 LBS | DIASTOLIC BLOOD PRESSURE: 80 MMHG | HEART RATE: 71 BPM | OXYGEN SATURATION: 96 % | SYSTOLIC BLOOD PRESSURE: 162 MMHG | TEMPERATURE: 97.7 F | HEIGHT: 68 IN

## 2019-10-24 DIAGNOSIS — Z48.02 VISIT FOR SUTURE REMOVAL: Primary | ICD-10-CM

## 2019-10-24 PROCEDURE — 99024 POSTOP FOLLOW-UP VISIT: CPT | Performed by: FAMILY MEDICINE

## 2019-10-24 NOTE — PROGRESS NOTES
Subjective   Godfrey Shah is a 74 y.o. male.     Chief Complaint   Patient presents with   • Suture / Staple Removal     has some stitches to remove on his left foot       Laceration on bath tub L foot a week, UTD with Td these happened last week, patient got his last tetanus shot 4 years ago, he got sick stitches on the left foot between the fourth and fifth toe           The following portions of the patient's history were reviewed and updated as appropriate: allergies, current medications, past family history, past medical history, past social history, past surgical history and problem list.    Past Medical History:   Diagnosis Date   • Acute bronchitis    • Allergic rhinitis    • Anxiety    • Arm mass, right    • ASCVD (arteriosclerotic cardiovascular disease)    • AV block    • Biceps muscle tear    • BPPV (benign paroxysmal positional vertigo)    • Bradycardia    • CAD (coronary artery disease)    • Colon polyps    • COPD (chronic obstructive pulmonary disease) (CMS/[facility])    • COPD exacerbation (CMS/[facility])    • Coronary artery disease    • Depression with anxiety    • Eczema    • Elevated PSA    • GERD (gastroesophageal reflux disease)    • Heart block    • Hemorrhoids    • Hyperglycemia    • Hyperlipidemia    • Hypertension    • Inability to attain erection    • Insomnia    • Nephrolithiasis    • Obstructive chronic bronchitis (CMS/[facility])    • RYAN (obstructive sleep apnea)    • Osteoarthritis    • Other fatigue    • Pacemaker    • Prostate cancer (CMS/[facility]) 05/2017   • Prostate cancer (CMS/[facility])    • Rotator cuff tendonitis, right    • Syncope        Past Surgical History:   Procedure Laterality Date   • CARDIAC CATHETERIZATION N/A 4/17/2019    Procedure: LEFT HEART CATH;  Surgeon: Lalo Naranjo MD;  Location: [facility] CATH INVASIVE LOCATION;  Service: Cardiovascular   • CARDIAC CATHETERIZATION N/A 4/17/2019    Procedure: CORONARY ANGIOGRAPHY;  Surgeon: Lalo Naranjo MD;  Location: [facility] INVASIVE  LOCATION;  Service: Cardiovascular   • CARDIAC CATHETERIZATION N/A 4/17/2019    Procedure: Left ventriculography;  Surgeon: Lalo Naranjo MD;  Location:  [facility] CATH INVASIVE LOCATION;  Service: Cardiovascular   • CARDIAC ELECTROPHYSIOLOGY PROCEDURE Left 4/18/2019    Procedure: Pacemaker DC new;  Surgeon: Curtis Moore DO;  Location:  [facility] CATH INVASIVE LOCATION;  Service: Cardiovascular   • CORONARY ANGIOPLASTY WITH STENT PLACEMENT     • PACEMAKER IMPLANTATION     • PENILE PROSTHESIS IMPLANT     • PENIS SURGERY      Penile implant   • PROSTATE BIOPSY  01/2015    histroy of needle biopsy of prostate - normal biopsy       Family History   Problem Relation Age of Onset   • No Known Problems Mother    • No Known Problems Father        Social History     Socioeconomic History   • Marital status:      Spouse name: Not on file   • Number of children: Not on file   • Years of education: Not on file   • Highest education level: Not on file   Tobacco Use   • Smoking status: Former Smoker   • Smokeless tobacco: Never Used   • Tobacco comment: Quit 2000   Substance and Sexual Activity   • Alcohol use: No     Frequency: Never   • Drug use: Defer   • Sexual activity: Defer       Review of Systems   Constitutional: Negative.    Respiratory: Negative.    Cardiovascular: Negative.    Skin:        Laceration repair between the fourth and fifth toes on the  on the left side   Neurological: Negative.    Hematological: Negative.    All other systems reviewed and are negative.      Objective   Vitals:    10/24/19 1339   BP: 162/80   Pulse: 71   Resp: 19   Temp: 97.7 °F (36.5 °C)   SpO2: 96%     Body mass index is 29.34 kg/m².  Physical Exam   Constitutional: He appears well-developed and well-nourished.   Cardiovascular: Normal rate, regular rhythm and normal heart sounds. Exam reveals no gallop and no friction rub.   No murmur heard.  Pulmonary/Chest: Effort normal and breath sounds normal. No stridor. No respiratory  distress. He has no wheezes. He has no rales.   Musculoskeletal: Normal range of motion.   Skin:   6 stitches in between the left fourth and fifth toe they are healing well good pronation borders there is no sign of infection   Nursing note and vitals reviewed.    Procedure I have been cleaned and using sterile gloves the 6 stitches  have been removed, also after the procedure we applied some antibiotic ointment, and dressing, advised to the patient to keep the area clean    Assessment/Plan   Godfrey was seen today for suture / staple removal.    Diagnoses and all orders for this visit:    Visit for suture removal        Keep area clean

## 2019-11-05 ENCOUNTER — TELEPHONE (OUTPATIENT)
Dept: FAMILY MEDICINE CLINIC | Facility: CLINIC | Age: 74
End: 2019-11-05

## 2019-11-05 RX ORDER — QUETIAPINE FUMARATE 100 MG/1
100 TABLET, FILM COATED ORAL NIGHTLY
Qty: 30 TABLET | Refills: 5 | Status: SHIPPED | OUTPATIENT
Start: 2019-11-05 | End: 2020-04-28

## 2019-11-05 NOTE — TELEPHONE ENCOUNTER
Patient would like to know if the Seroquel can be increased to 100 mg to help him sleep & possibly something for anxiety , his 8 year old grandson has been diagnosed with terminal brain cancer, he starts radiation today which will only shrink the tumor. Mr. Shah isn't sleeping well & having a lot of anxiety

## 2019-11-05 NOTE — TELEPHONE ENCOUNTER
Pt wife is aware that we increased this medication. I told her tocall us if they need anything at all

## 2019-11-05 NOTE — TELEPHONE ENCOUNTER
Increase seroquel to 100 mg at night.  Tell him this helps with anxiety as well.  Disp 30 with 5 refills.

## 2019-12-10 ENCOUNTER — OFFICE VISIT (OUTPATIENT)
Dept: FAMILY MEDICINE CLINIC | Facility: CLINIC | Age: 74
End: 2019-12-10

## 2019-12-10 ENCOUNTER — TELEPHONE (OUTPATIENT)
Dept: FAMILY MEDICINE CLINIC | Facility: CLINIC | Age: 74
End: 2019-12-10

## 2019-12-10 VITALS
BODY MASS INDEX: 30.55 KG/M2 | DIASTOLIC BLOOD PRESSURE: 79 MMHG | HEIGHT: 68 IN | HEART RATE: 97 BPM | WEIGHT: 201.6 LBS | SYSTOLIC BLOOD PRESSURE: 146 MMHG | OXYGEN SATURATION: 93 % | TEMPERATURE: 98.8 F

## 2019-12-10 DIAGNOSIS — Z00.00 MEDICARE ANNUAL WELLNESS VISIT, SUBSEQUENT: Primary | ICD-10-CM

## 2019-12-10 DIAGNOSIS — R73.9 HYPERGLYCEMIA: ICD-10-CM

## 2019-12-10 DIAGNOSIS — E78.2 MIXED HYPERLIPIDEMIA: ICD-10-CM

## 2019-12-10 DIAGNOSIS — F51.01 PRIMARY INSOMNIA: ICD-10-CM

## 2019-12-10 DIAGNOSIS — I25.10 CORONARY ARTERY DISEASE INVOLVING NATIVE CORONARY ARTERY OF NATIVE HEART WITHOUT ANGINA PECTORIS: ICD-10-CM

## 2019-12-10 PROCEDURE — 99214 OFFICE O/P EST MOD 30 MIN: CPT | Performed by: FAMILY MEDICINE

## 2019-12-10 PROCEDURE — G0439 PPPS, SUBSEQ VISIT: HCPCS | Performed by: FAMILY MEDICINE

## 2019-12-10 NOTE — PROGRESS NOTES
The ABCs of the Annual Wellness Visit  Subsequent Medicare Wellness Visit    Chief Complaint   Patient presents with   • Hyperlipidemia   FU CAD    Subjective   History of Present Illness:  Godfrey Shah is a 74 y.o. male who presents for a Subsequent Medicare Wellness Visit.  FU CAD.  Walking 2 miles a day.  Bowling 5 days a weeks.    F/U hyperlipidemia.  No myalgias.    F/U insomnia.  Doing wel lwith quetiapine 100 once a day.    HEALTH RISK ASSESSMENT    Recent Hospitalizations:  No hospitalization(s) within the last year.    Current Medical Providers:  Patient Care Team:  Godfrey Jackson MD as PCP - General (Family Medicine)    Smoking Status:  Social History     Tobacco Use   Smoking Status Former Smoker   Smokeless Tobacco Never Used   Tobacco Comment    Quit 2000       Alcohol Consumption:  Social History     Substance and Sexual Activity   Alcohol Use No   • Frequency: Never       Depression Screen:   PHQ-2/PHQ-9 Depression Screening 10/24/2019   Little interest or pleasure in doing things 0   Feeling down, depressed, or hopeless 0   Total Score 0       Fall Risk Screen:  STEADI Fall Risk Assessment was completed, and patient is at LOW risk for falls.Assessment completed on:12/10/2019    Health Habits and Functional and Cognitive Screening:  Functional & Cognitive Status 12/10/2019   Do you have difficulty preparing food and eating? No   Do you have difficulty bathing yourself, getting dressed or grooming yourself? No   Do you have difficulty using the toilet? No   Do you have difficulty moving around from place to place? No   Do you have trouble with steps or getting out of a bed or a chair? No   Current Diet Well Balanced Diet   Dental Exam Up to date   Eye Exam Up to date   Exercise (times per week) 7 times per week   Current Exercise Activities Include Walking   Do you need help using the phone?  No   Are you deaf or do you have serious difficulty hearing?  No   Do you need help with  transportation? No   Do you need help shopping? No   Do you need help preparing meals?  No   Do you need help with housework?  No   Do you need help with laundry? No   Do you need help taking your medications? No   Do you need help managing money? No   Do you ever drive or ride in a car without wearing a seat belt? No   Have you felt unusual stress, anger or loneliness in the last month? No   Who do you live with? Spouse   If you need help, do you have trouble finding someone available to you? No   Have you been bothered in the last four weeks by sexual problems? No   Do you have difficulty concentrating, remembering or making decisions? No         Does the patient have evidence of cognitive impairment? No    Asprin use counseling:Taking ASA appropriately as indicated    Age-appropriate Screening Schedule:  Refer to the list below for future screening recommendations based on patient's age, sex and/or medical conditions. Orders for these recommended tests are listed in the plan section. The patient has been provided with a written plan.    Health Maintenance   Topic Date Due   • TDAP/TD VACCINES (1 - Tdap) 05/23/1956   • ZOSTER VACCINE (2 of 3) 09/24/2012   • INFLUENZA VACCINE  08/01/2019   • LIPID PANEL  04/18/2020   • COLONOSCOPY  07/17/2024   • PNEUMOCOCCAL VACCINES (65+ LOW/MEDIUM RISK)  Completed          The following portions of the patient's history were reviewed and updated as appropriate: allergies, current medications, past family history, past medical history, past social history, past surgical history and problem list.    Outpatient Medications Prior to Visit   Medication Sig Dispense Refill   • aspirin 81 MG EC tablet Take 1 tablet by mouth Daily. 30 tablet 5   • atorvastatin (LIPITOR) 20 MG tablet Take 1 tablet by mouth Every Night. 30 tablet 5   • cetirizine (ZyrTEC) 10 MG tablet Take 10 mg by mouth daily.     • clopidogrel (PLAVIX) 75 MG tablet Take 1 tablet by mouth Daily. 30 tablet 5   • losartan  (COZAAR) 50 MG tablet Take 1 tablet by mouth Daily. 90 tablet 3   • meclizine (ANTIVERT) 25 MG tablet Take 1 tablet by mouth 3 (Three) Times a Day As Needed for dizziness. 15 tablet 0   • nitroglycerin (NITROSTAT) 0.4 MG SL tablet Place 1 tablet under the tongue Every 5 (Five) Minutes As Needed for Chest Pain (Chest Pain With Systolic Blood Pressure Greater Than 100). 30 tablet 12   • pantoprazole (PROTONIX) 40 MG EC tablet Take 40 mg by mouth Daily.  3   • QUEtiapine (SEROquel) 100 MG tablet Take 1 tablet by mouth Every Night. 30 tablet 5   • VENTOLIN  (90 BASE) MCG/ACT inhaler INL 2 PFS PO Q 4 H PRN  3     No facility-administered medications prior to visit.        Patient Active Problem List   Diagnosis   • Bradycardia, sinus   • Sleep-disordered breathing   • Symptomatic bradycardia   • AV block   • Prostate cancer (CMS/[facility])   • Pacemaker   • Insomnia   • RYAN (obstructive sleep apnea)   • Nephrolithiasis   • Hyperglycemia   • GERD (gastroesophageal reflux disease)   • Osteoarthritis   • COPD (chronic obstructive pulmonary disease) (CMS/[facility])   • Inability to attain erection   • Coronary artery disease   • Visit for suture removal   • Medicare annual wellness visit, subsequent   • Mixed hyperlipidemia       Advanced Care Planning:  Patient has an advance directive - a copy has not been provided. Have asked the patient to send this to us to add to record    Review of Systems   Constitutional: Negative for activity change, appetite change and fatigue.   HENT: Negative for hearing loss and postnasal drip.    Eyes: Negative for discharge and itching.   Respiratory: Negative for cough and shortness of breath.    Cardiovascular: Negative for chest pain and leg swelling.   Gastrointestinal: Negative for abdominal distention and abdominal pain.   Endocrine: Negative for cold intolerance and heat intolerance.   Genitourinary: Negative for difficulty urinating and flank pain.   Musculoskeletal: Negative for  "arthralgias and myalgias.   Skin: Negative for color change.   Neurological: Negative for dizziness and facial asymmetry.   Hematological: Negative for adenopathy.   Psychiatric/Behavioral: Negative for agitation and confusion.       Compared to one year ago, the patient feels his physical health is the same.  Compared to one year ago, the patient feels his mental health is the same.    Reviewed chart for potential of high risk medication in the elderly: yes  Reviewed chart for potential of harmful drug interactions in the elderly:yes    Objective         Vitals:    12/10/19 0800   BP: 146/79   Pulse: 97   Temp: 98.8 °F (37.1 °C)   SpO2: 93%   Weight: 91.4 kg (201 lb 9.6 oz)   Height: 172.7 cm (68\")       Body mass index is 30.65 kg/m².  Discussed the patient's BMI with him. The BMI is above average; BMI management plan is completed.    Physical Exam   Constitutional: He appears well-developed and well-nourished.   HENT:   Head: Normocephalic and atraumatic.   Right Ear: External ear normal.   Left Ear: External ear normal.   Nose: Nose normal.   Mouth/Throat: Oropharynx is clear and moist.   Eyes: Pupils are equal, round, and reactive to light. Conjunctivae and EOM are normal. Right eye exhibits no discharge. Left eye exhibits no discharge. No scleral icterus.   Neck: Normal range of motion. Neck supple. No JVD present. No tracheal deviation present. No thyromegaly present.   Cardiovascular: Normal rate, regular rhythm, normal heart sounds and intact distal pulses. Exam reveals no gallop and no friction rub.   No murmur heard.  Pulmonary/Chest: Effort normal and breath sounds normal. No respiratory distress. He has no wheezes. He has no rales. He exhibits no tenderness.   Abdominal: Soft. Bowel sounds are normal. He exhibits no distension and no mass. There is no tenderness. There is no rebound and no guarding. No hernia.   Musculoskeletal: Normal range of motion. He exhibits no edema or tenderness. "   Lymphadenopathy:     He has no cervical adenopathy.   Neurological: He displays normal reflexes. No cranial nerve deficit or sensory deficit. He exhibits normal muscle tone. Coordination normal.   Skin: Skin is warm and dry.   Psychiatric: He has a normal mood and affect. His behavior is normal. Judgment and thought content normal.   Nursing note and vitals reviewed.            Assessment/Plan   Medicare Risks and Personalized Health Plan  CMS Preventative Services Quick Reference  Inactivity/Sedentary    The above risks/problems have been discussed with the patient.  Pertinent information has been shared with the patient in the After Visit Summary.  Follow up plans and orders are seen below in the Assessment/Plan Section.    Diagnoses and all orders for this visit:    1. Medicare annual wellness visit, subsequent (Primary)    2. Coronary artery disease involving native coronary artery of native heart without angina pectoris  Comments:  Stable.  Continue asa/plavix/statin.   Orders:  -     Comprehensive Metabolic Panel  -     Lipid Panel With / Chol / HDL Ratio    3. Hyperglycemia  -     Hemoglobin A1c    4. Primary insomnia  Comments:  Controlled.  continue seroquel.      5. Mixed hyperlipidemia    Continue statin.   Follow Up:  Return in about 6 months (around 6/10/2020).     An After Visit Summary and PPPS were given to the patient.

## 2019-12-11 LAB
ALBUMIN SERPL-MCNC: 4.5 G/DL (ref 3.5–5.2)
ALBUMIN/GLOB SERPL: 1.7 G/DL
ALP SERPL-CCNC: 105 U/L (ref 39–117)
ALT SERPL-CCNC: 15 U/L (ref 1–41)
AST SERPL-CCNC: 19 U/L (ref 1–40)
BILIRUB SERPL-MCNC: 1.3 MG/DL (ref 0.2–1.2)
BUN SERPL-MCNC: 13 MG/DL (ref 8–23)
BUN/CREAT SERPL: 13.4 (ref 7–25)
CALCIUM SERPL-MCNC: 9.1 MG/DL (ref 8.6–10.5)
CHLORIDE SERPL-SCNC: 102 MMOL/L (ref 98–107)
CHOLEST SERPL-MCNC: 147 MG/DL (ref 0–200)
CHOLEST/HDLC SERPL: 3.59 {RATIO}
CO2 SERPL-SCNC: 30.1 MMOL/L (ref 22–29)
CREAT SERPL-MCNC: 0.97 MG/DL (ref 0.76–1.27)
GLOBULIN SER CALC-MCNC: 2.7 GM/DL
GLUCOSE SERPL-MCNC: 111 MG/DL (ref 65–99)
HBA1C MFR BLD: 5.8 % (ref 4.8–5.6)
HDLC SERPL-MCNC: 41 MG/DL (ref 40–60)
LDLC SERPL CALC-MCNC: 76 MG/DL (ref 0–100)
POTASSIUM SERPL-SCNC: 4.7 MMOL/L (ref 3.5–5.2)
PROT SERPL-MCNC: 7.2 G/DL (ref 6–8.5)
SODIUM SERPL-SCNC: 143 MMOL/L (ref 136–145)
TRIGL SERPL-MCNC: 150 MG/DL (ref 0–150)
VLDLC SERPL CALC-MCNC: 30 MG/DL

## 2020-01-28 ENCOUNTER — OFFICE VISIT (OUTPATIENT)
Dept: FAMILY MEDICINE CLINIC | Facility: CLINIC | Age: 75
End: 2020-01-28

## 2020-01-28 VITALS
BODY MASS INDEX: 30.31 KG/M2 | WEIGHT: 200 LBS | SYSTOLIC BLOOD PRESSURE: 122 MMHG | HEIGHT: 68 IN | HEART RATE: 90 BPM | OXYGEN SATURATION: 90 % | DIASTOLIC BLOOD PRESSURE: 82 MMHG | TEMPERATURE: 97.8 F

## 2020-01-28 DIAGNOSIS — I10 ESSENTIAL HYPERTENSION: ICD-10-CM

## 2020-01-28 DIAGNOSIS — J44.1 COPD WITH ACUTE EXACERBATION (HCC): Primary | ICD-10-CM

## 2020-01-28 DIAGNOSIS — I25.10 CORONARY ARTERY DISEASE INVOLVING NATIVE CORONARY ARTERY OF NATIVE HEART WITHOUT ANGINA PECTORIS: ICD-10-CM

## 2020-01-28 PROCEDURE — 99214 OFFICE O/P EST MOD 30 MIN: CPT | Performed by: FAMILY MEDICINE

## 2020-01-28 PROCEDURE — 96372 THER/PROPH/DIAG INJ SC/IM: CPT | Performed by: FAMILY MEDICINE

## 2020-01-28 RX ORDER — METOPROLOL SUCCINATE 25 MG/1
25 TABLET, EXTENDED RELEASE ORAL DAILY
COMMUNITY
End: 2022-05-17 | Stop reason: SDUPTHER

## 2020-01-28 RX ORDER — CEFPROZIL 500 MG/1
500 TABLET, FILM COATED ORAL 2 TIMES DAILY
Qty: 20 TABLET | Refills: 0 | Status: SHIPPED | OUTPATIENT
Start: 2020-01-28 | End: 2020-11-18

## 2020-01-28 RX ORDER — METHYLPREDNISOLONE ACETATE 80 MG/ML
80 INJECTION, SUSPENSION INTRA-ARTICULAR; INTRALESIONAL; INTRAMUSCULAR; SOFT TISSUE ONCE
Status: COMPLETED | OUTPATIENT
Start: 2020-01-28 | End: 2020-01-28

## 2020-01-28 RX ORDER — CEFPROZIL 500 MG/1
500 TABLET, FILM COATED ORAL 2 TIMES DAILY
Qty: 20 TABLET | Refills: 0 | Status: SHIPPED | OUTPATIENT
Start: 2020-01-28 | End: 2020-01-28

## 2020-01-28 RX ORDER — LOSARTAN POTASSIUM AND HYDROCHLOROTHIAZIDE 25; 100 MG/1; MG/1
0.5 TABLET ORAL DAILY
COMMUNITY
Start: 2020-01-10 | End: 2020-06-16 | Stop reason: SDUPTHER

## 2020-01-28 RX ADMIN — METHYLPREDNISOLONE ACETATE 80 MG: 80 INJECTION, SUSPENSION INTRA-ARTICULAR; INTRALESIONAL; INTRAMUSCULAR; SOFT TISSUE at 09:12

## 2020-01-28 NOTE — PROGRESS NOTES
Chief Complaint   Patient presents with   • Hypertension   • Cough     pt states he has been coughing up blood since sunday morning       Subjective   Godfrey Shah is a 74 y.o. male.     History of Present Illness   C/o lot of coughing with drainage.  Occ wheezing for 1 week.   Fu HTN.  Doing well with losartan/hct just started.    FU CAD.  Doing well with meds.  On plavix and aspirin now.  NO CP.       The following portions of the patient's history were reviewed and updated as appropriate: allergies, current medications, past family history, past medical history, past social history, past surgical history and problem list.    Review of Systems   Constitutional: Negative for appetite change, fatigue and fever.   HENT: Positive for postnasal drip. Negative for nosebleeds and sore throat.    Eyes: Negative for blurred vision and visual disturbance.   Respiratory: Positive for shortness of breath and wheezing.    Cardiovascular: Negative for chest pain and leg swelling.   Gastrointestinal: Negative for abdominal distention and abdominal pain.   Endocrine: Negative for cold intolerance and polyuria.   Genitourinary: Negative for dysuria and hematuria.   Musculoskeletal: Negative for arthralgias and myalgias.   Skin: Negative for color change and rash.   Neurological: Negative for weakness and confusion.   Psychiatric/Behavioral: Negative for agitation and depressed mood.       Patient Active Problem List   Diagnosis   • Bradycardia, sinus   • Sleep-disordered breathing   • Symptomatic bradycardia   • AV block   • Prostate cancer (CMS/HCC)   • Pacemaker   • Insomnia   • RYAN (obstructive sleep apnea)   • Nephrolithiasis   • Hyperglycemia   • GERD (gastroesophageal reflux disease)   • Osteoarthritis   • COPD (chronic obstructive pulmonary disease) (CMS/[facility])   • Inability to attain erection   • Coronary artery disease   • Visit for suture removal   • Medicare annual wellness visit, subsequent   • Mixed hyperlipidemia    • COPD with acute exacerbation (CMS/HCC)   • Essential hypertension       No Known Allergies      Current Outpatient Medications:   •  aspirin 81 MG EC tablet, Take 1 tablet by mouth Daily., Disp: 30 tablet, Rfl: 5  •  atorvastatin (LIPITOR) 20 MG tablet, Take 1 tablet by mouth Every Night., Disp: 30 tablet, Rfl: 5  •  cetirizine (ZyrTEC) 10 MG tablet, Take 10 mg by mouth daily., Disp: , Rfl:   •  clopidogrel (PLAVIX) 75 MG tablet, Take 1 tablet by mouth Daily., Disp: 30 tablet, Rfl: 5  •  losartan-hydrochlorothiazide (HYZAAR) 100-25 MG per tablet, Take 1 tablet by mouth Daily., Disp: , Rfl:   •  meclizine (ANTIVERT) 25 MG tablet, Take 1 tablet by mouth 3 (Three) Times a Day As Needed for dizziness., Disp: 15 tablet, Rfl: 0  •  metoprolol succinate XL (TOPROL-XL) 25 MG 24 hr tablet, Take 25 mg by mouth Daily., Disp: , Rfl:   •  nitroglycerin (NITROSTAT) 0.4 MG SL tablet, Place 1 tablet under the tongue Every 5 (Five) Minutes As Needed for Chest Pain (Chest Pain With Systolic Blood Pressure Greater Than 100)., Disp: 30 tablet, Rfl: 12  •  pantoprazole (PROTONIX) 40 MG EC tablet, Take 40 mg by mouth Daily., Disp: , Rfl: 3  •  QUEtiapine (SEROquel) 100 MG tablet, Take 1 tablet by mouth Every Night., Disp: 30 tablet, Rfl: 5  •  VENTOLIN  (90 BASE) MCG/ACT inhaler, INL 2 PFS PO Q 4 H PRN, Disp: , Rfl: 3  •  cefprozil (CEFZIL) 500 MG tablet, Take 1 tablet by mouth 2 (Two) Times a Day., Disp: 20 tablet, Rfl: 0    Current Facility-Administered Medications:   •  methylPREDNISolone acetate (DEPO-medrol) injection 80 mg, 80 mg, Intramuscular, Once, Godfrey Jackson MD    Past Medical History:   Diagnosis Date   • Acute bronchitis    • Allergic rhinitis    • Anxiety    • Arm mass, right    • ASCVD (arteriosclerotic cardiovascular disease)    • AV block    • Biceps muscle tear    • BPPV (benign paroxysmal positional vertigo)    • Bradycardia    • CAD (coronary artery disease)    • Colon polyps    • COPD (chronic  obstructive pulmonary disease) (CMS/[facility])    • COPD exacerbation (CMS/[facility])    • Coronary artery disease    • Depression with anxiety    • Eczema    • Elevated PSA    • GERD (gastroesophageal reflux disease)    • Heart block    • Hemorrhoids    • Hyperglycemia    • Hyperlipidemia    • Hypertension    • Inability to attain erection    • Insomnia    • Nephrolithiasis    • Obstructive chronic bronchitis (CMS/[facility])    • RYAN (obstructive sleep apnea)    • Osteoarthritis    • Other fatigue    • Pacemaker    • Prostate cancer (CMS/[facility]) 05/2017   • Prostate cancer (CMS/[facility])    • Rotator cuff tendonitis, right    • Syncope        Past Surgical History:   Procedure Laterality Date   • CARDIAC CATHETERIZATION N/A 4/17/2019    Procedure: LEFT HEART CATH;  Surgeon: Lalo Naranjo MD;  Location:  [facility];  Service: Cardiovascular   • CARDIAC CATHETERIZATION N/A 4/17/2019    Procedure: CORONARY ANGIOGRAPHY;  Surgeon: Lalo Naranjo MD;  Location:  [facility];  Service: Cardiovascular   • CARDIAC CATHETERIZATION N/A 4/17/2019    Procedure: Left ventriculography;  Surgeon: Lalo Naranjo MD;  Location:  [facility];  Service: Cardiovascular   • CARDIAC ELECTROPHYSIOLOGY PROCEDURE Left 4/18/2019    Procedure: Pacemaker DC new;  Surgeon: Curtis Moore DO;  Location:  [facility];  Service: Cardiovascular   • COLONOSCOPY  04/2017   • CORONARY ANGIOPLASTY WITH STENT PLACEMENT     • PACEMAKER IMPLANTATION     • PENILE PROSTHESIS IMPLANT     • PENIS SURGERY      Penile implant   • PROSTATE BIOPSY  01/2015    histroy of needle biopsy of prostate - normal biopsy       Family History   Problem Relation Age of Onset   • No Known Problems Mother    • No Known Problems Father        Social History     Tobacco Use   • Smoking status: Former Smoker   • Smokeless tobacco: Never Used   • Tobacco comment: Quit 2000   Substance Use Topics   • Alcohol use: No      Frequency: Never            Objective     Vitals:    01/28/20 0836   BP: 122/82   Pulse: 90   Temp: 97.8 °F (36.6 °C)   SpO2: 90%     Body mass index is 30.41 kg/m².    Physical Exam   Constitutional: He appears well-developed and well-nourished.   HENT:   Head: Normocephalic and atraumatic.   Mouth/Throat: Oropharynx is clear and moist.   Eyes: Pupils are equal, round, and reactive to light. No scleral icterus.   Neck: No thyromegaly present.   Cardiovascular: Normal rate and regular rhythm. Exam reveals no gallop and no friction rub.   No murmur heard.  Pulmonary/Chest: Effort normal. No respiratory distress. He has wheezes. He has no rales. He exhibits no tenderness.   Scattered end exp wheezing.   Abdominal: Soft. Bowel sounds are normal. He exhibits no distension. There is no tenderness.   Musculoskeletal: Normal range of motion. He exhibits no edema or deformity.   Lymphadenopathy:     He has no cervical adenopathy.   Neurological: No cranial nerve deficit. He exhibits normal muscle tone.   Skin: Skin is warm and dry. No rash noted. He is not diaphoretic.   Vitals reviewed.      Lab Results   Component Value Date    GLUCOSE 125 (H) 07/21/2019    BUN 13 12/10/2019    CREATININE 0.97 12/10/2019    EGFRIFNONA 76 12/10/2019    EGFRIFAFRI 92 12/10/2019    BCR 13.4 12/10/2019    K 4.7 12/10/2019    CO2 30.1 (H) 12/10/2019    CALCIUM 9.1 12/10/2019    PROTENTOTREF 7.2 12/10/2019    ALBUMIN 4.50 12/10/2019    LABIL2 1.7 12/10/2019    AST 19 12/10/2019    ALT 15 12/10/2019       WBC   Date Value Ref Range Status   07/21/2019 4.72 3.40 - 10.80 10*3/mm3 Final     RBC   Date Value Ref Range Status   07/21/2019 4.71 4.14 - 5.80 10*6/mm3 Final     Hemoglobin   Date Value Ref Range Status   07/21/2019 15.2 13.0 - 17.7 g/dL Final     Hematocrit   Date Value Ref Range Status   07/21/2019 45.8 37.5 - 51.0 % Final     MCV   Date Value Ref Range Status   07/21/2019 97.2 (H) 79.0 - 97.0 fL Final     MCH   Date Value Ref Range  Status   07/21/2019 32.3 26.6 - 33.0 pg Final     MCHC   Date Value Ref Range Status   07/21/2019 33.2 31.5 - 35.7 g/dL Final     RDW   Date Value Ref Range Status   07/21/2019 12.4 12.3 - 15.4 % Final     RDW-SD   Date Value Ref Range Status   07/21/2019 44.6 37.0 - 54.0 fl Final     MPV   Date Value Ref Range Status   07/21/2019 10.0 6.0 - 12.0 fL Final     Platelets   Date Value Ref Range Status   07/21/2019 115 (L) 140 - 450 10*3/mm3 Final     Neutrophil %   Date Value Ref Range Status   07/21/2019 60.2 42.7 - 76.0 % Final     Lymphocyte %   Date Value Ref Range Status   07/21/2019 32.2 19.6 - 45.3 % Final     Monocyte %   Date Value Ref Range Status   07/21/2019 6.4 5.0 - 12.0 % Final     Eosinophil %   Date Value Ref Range Status   07/21/2019 0.8 0.3 - 6.2 % Final     Basophil %   Date Value Ref Range Status   07/21/2019 0.2 0.0 - 1.5 % Final     Immature Grans %   Date Value Ref Range Status   08/18/2016 0.0 0.0 - 0.5 % Final     Neutrophils, Absolute   Date Value Ref Range Status   07/21/2019 2.84 1.70 - 7.00 10*3/mm3 Final     Lymphocytes, Absolute   Date Value Ref Range Status   07/21/2019 1.52 0.70 - 3.10 10*3/mm3 Final     Monocytes, Absolute   Date Value Ref Range Status   07/21/2019 0.30 0.10 - 0.90 10*3/mm3 Final     Eosinophils, Absolute   Date Value Ref Range Status   07/21/2019 0.04 0.00 - 0.40 10*3/mm3 Final     Basophils, Absolute   Date Value Ref Range Status   07/21/2019 0.01 0.00 - 0.20 10*3/mm3 Final     Immature Grans, Absolute   Date Value Ref Range Status   08/18/2016 0.00 0.00 - 0.03 10*3/mm3 Final       Lab Results   Component Value Date    HGBA1C 5.80 (H) 12/10/2019       No results found for: LGQYXTVH10    TSH   Date Value Ref Range Status   04/15/2019 1.030 0.270 - 4.200 mIU/mL Final       Lab Results   Component Value Date    CHOL 149 04/18/2019     Lab Results   Component Value Date    TRIG 150 12/10/2019     Lab Results   Component Value Date    HDL 41 12/10/2019     Lab Results    Component Value Date    LDL 76 12/10/2019     Lab Results   Component Value Date    VLDL 30 12/10/2019     Lab Results   Component Value Date    LDLHDL 2.84 04/18/2019         Procedures    Assessment/Plan   Problems Addressed this Visit        Cardiovascular and Mediastinum    Coronary artery disease    Relevant Medications    metoprolol succinate XL (TOPROL-XL) 25 MG 24 hr tablet    Essential hypertension    Relevant Medications    losartan-hydrochlorothiazide (HYZAAR) 100-25 MG per tablet    metoprolol succinate XL (TOPROL-XL) 25 MG 24 hr tablet       Respiratory    COPD with acute exacerbation (CMS/HCC) - Primary    Relevant Medications    methylPREDNISolone acetate (DEPO-medrol) injection 80 mg (Start on 1/28/2020 10:00 AM)    cefprozil (CEFZIL) 500 MG tablet          No orders of the defined types were placed in this encounter.      Current Outpatient Medications   Medication Sig Dispense Refill   • aspirin 81 MG EC tablet Take 1 tablet by mouth Daily. 30 tablet 5   • atorvastatin (LIPITOR) 20 MG tablet Take 1 tablet by mouth Every Night. 30 tablet 5   • cetirizine (ZyrTEC) 10 MG tablet Take 10 mg by mouth daily.     • clopidogrel (PLAVIX) 75 MG tablet Take 1 tablet by mouth Daily. 30 tablet 5   • losartan-hydrochlorothiazide (HYZAAR) 100-25 MG per tablet Take 1 tablet by mouth Daily.     • meclizine (ANTIVERT) 25 MG tablet Take 1 tablet by mouth 3 (Three) Times a Day As Needed for dizziness. 15 tablet 0   • metoprolol succinate XL (TOPROL-XL) 25 MG 24 hr tablet Take 25 mg by mouth Daily.     • nitroglycerin (NITROSTAT) 0.4 MG SL tablet Place 1 tablet under the tongue Every 5 (Five) Minutes As Needed for Chest Pain (Chest Pain With Systolic Blood Pressure Greater Than 100). 30 tablet 12   • pantoprazole (PROTONIX) 40 MG EC tablet Take 40 mg by mouth Daily.  3   • QUEtiapine (SEROquel) 100 MG tablet Take 1 tablet by mouth Every Night. 30 tablet 5   • VENTOLIN  (90 BASE) MCG/ACT inhaler INL 2 PFS PO Q 4  H PRN  3   • cefprozil (CEFZIL) 500 MG tablet Take 1 tablet by mouth 2 (Two) Times a Day. 20 tablet 0     Current Facility-Administered Medications   Medication Dose Route Frequency Provider Last Rate Last Dose   • methylPREDNISolone acetate (DEPO-medrol) injection 80 mg  80 mg Intramuscular Once Godfrey Jackson MD [facility] had no medications administered during this visit.    No follow-ups on file.    There are no Patient Instructions on file for this visit.

## 2020-04-28 RX ORDER — QUETIAPINE FUMARATE 100 MG/1
100 TABLET, FILM COATED ORAL NIGHTLY
Qty: 30 TABLET | Refills: 5 | Status: SHIPPED | OUTPATIENT
Start: 2020-04-28 | End: 2020-11-18 | Stop reason: SDUPTHER

## 2020-06-05 RX ORDER — PANTOPRAZOLE SODIUM 40 MG/1
40 TABLET, DELAYED RELEASE ORAL DAILY
Qty: 90 TABLET | Refills: 3 | Status: SHIPPED | OUTPATIENT
Start: 2020-06-05 | End: 2021-05-19 | Stop reason: SDUPTHER

## 2020-06-16 ENCOUNTER — OFFICE VISIT (OUTPATIENT)
Dept: FAMILY MEDICINE CLINIC | Facility: CLINIC | Age: 75
End: 2020-06-16

## 2020-06-16 VITALS
TEMPERATURE: 98.4 F | WEIGHT: 207.2 LBS | OXYGEN SATURATION: 92 % | HEIGHT: 68 IN | BODY MASS INDEX: 31.4 KG/M2 | DIASTOLIC BLOOD PRESSURE: 68 MMHG | HEART RATE: 67 BPM | SYSTOLIC BLOOD PRESSURE: 111 MMHG

## 2020-06-16 DIAGNOSIS — R73.9 HYPERGLYCEMIA: Primary | ICD-10-CM

## 2020-06-16 DIAGNOSIS — E78.2 MIXED HYPERLIPIDEMIA: ICD-10-CM

## 2020-06-16 DIAGNOSIS — I10 ESSENTIAL HYPERTENSION: ICD-10-CM

## 2020-06-16 DIAGNOSIS — I48.0 PAROXYSMAL ATRIAL FIBRILLATION (HCC): ICD-10-CM

## 2020-06-16 PROCEDURE — 99214 OFFICE O/P EST MOD 30 MIN: CPT | Performed by: FAMILY MEDICINE

## 2020-06-16 RX ORDER — WARFARIN SODIUM 3 MG/1
TABLET ORAL
Qty: 90 TABLET | Refills: 3
Start: 2020-06-16 | End: 2021-09-16 | Stop reason: SDUPTHER

## 2020-06-16 RX ORDER — LOSARTAN POTASSIUM AND HYDROCHLOROTHIAZIDE 25; 100 MG/1; MG/1
0.5 TABLET ORAL DAILY
Qty: 45 TABLET | Refills: 3
Start: 2020-06-16 | End: 2021-05-19 | Stop reason: SDUPTHER

## 2020-06-16 NOTE — PROGRESS NOTES
Chief Complaint   Patient presents with   • Hyperlipidemia   • Hypertension   • COPD   • Insomnia       Subjective   Godfrey Shah is a 75 y.o. male.     History of Present Illness   F/U HTN.  No orhtostasis.  On losartan /hctz 100/25 1/2 a day.   F/U hyperlipidemia.  No myalgias.  LDL 76 6 months.    F/U afib.  On coumadin 3mg  Aday.  Noted a fib on monitoring of pacemaker.  Sees Dr Naranjo.    The following portions of the patient's history were reviewed and updated as appropriate: allergies, current medications, past family history, past medical history, past social history, past surgical history and problem list.    Review of Systems   Constitutional: Negative for appetite change and fatigue.   HENT: Negative for nosebleeds and sore throat.    Eyes: Negative for blurred vision and visual disturbance.   Respiratory: Negative for shortness of breath and wheezing.    Cardiovascular: Negative for chest pain and leg swelling.   Gastrointestinal: Negative for abdominal distention and abdominal pain.   Endocrine: Negative for cold intolerance and polyuria.   Genitourinary: Negative for dysuria and hematuria.   Musculoskeletal: Negative for arthralgias and myalgias.   Skin: Negative for color change and rash.   Neurological: Negative for weakness and confusion.   Psychiatric/Behavioral: Negative for agitation and depressed mood.       Patient Active Problem List   Diagnosis   • Bradycardia, sinus   • Sleep-disordered breathing   • Symptomatic bradycardia   • AV block   • Prostate cancer (CMS/[facility])   • Pacemaker   • Insomnia   • RYAN (obstructive sleep apnea)   • Nephrolithiasis   • Hyperglycemia   • GERD (gastroesophageal reflux disease)   • Osteoarthritis   • COPD (chronic obstructive pulmonary disease) (CMS/[facility])   • Inability to attain erection   • Coronary artery disease   • Visit for suture removal   • Medicare annual wellness visit, subsequent   • Mixed hyperlipidemia   • COPD with acute exacerbation (CMS/[facility])   •  Essential hypertension   • Paroxysmal atrial fibrillation (CMS/HCC)       No Known Allergies      Current Outpatient Medications:   •  atorvastatin (LIPITOR) 20 MG tablet, Take 1 tablet by mouth Every Night., Disp: 30 tablet, Rfl: 5  •  cetirizine (ZyrTEC) 10 MG tablet, Take 10 mg by mouth daily., Disp: , Rfl:   •  clopidogrel (PLAVIX) 75 MG tablet, Take 1 tablet by mouth Daily., Disp: 30 tablet, Rfl: 5  •  losartan-hydrochlorothiazide (HYZAAR) 100-25 MG per tablet, Take 0.5 tablets by mouth Daily., Disp: 45 tablet, Rfl: 3  •  meclizine (ANTIVERT) 25 MG tablet, Take 1 tablet by mouth 3 (Three) Times a Day As Needed for dizziness., Disp: 15 tablet, Rfl: 0  •  metoprolol succinate XL (TOPROL-XL) 25 MG 24 hr tablet, Take 25 mg by mouth Daily., Disp: , Rfl:   •  nitroglycerin (NITROSTAT) 0.4 MG SL tablet, Place 1 tablet under the tongue Every 5 (Five) Minutes As Needed for Chest Pain (Chest Pain With Systolic Blood Pressure Greater Than 100)., Disp: 30 tablet, Rfl: 12  •  pantoprazole (PROTONIX) 40 MG EC tablet, Take 1 tablet by mouth Daily., Disp: 90 tablet, Rfl: 3  •  QUEtiapine (SEROquel) 100 MG tablet, TAKE 1 TABLET BY MOUTH EVERY NIGHT, Disp: 30 tablet, Rfl: 5  •  VENTOLIN  (90 BASE) MCG/ACT inhaler, INL 2 PFS PO Q 4 H PRN, Disp: , Rfl: 3  •  cefprozil (CEFZIL) 500 MG tablet, Take 1 tablet by mouth 2 (Two) Times a Day., Disp: 20 tablet, Rfl: 0  •  warfarin (Coumadin) 3 MG tablet, One a day, Disp: 90 tablet, Rfl: 3    Past Medical History:   Diagnosis Date   • Acute bronchitis    • Allergic rhinitis    • Anxiety    • Arm mass, right    • ASCVD (arteriosclerotic cardiovascular disease)    • AV block    • Biceps muscle tear    • BPPV (benign paroxysmal positional vertigo)    • Bradycardia    • CAD (coronary artery disease)    • Colon polyps    • COPD (chronic obstructive pulmonary disease) (CMS/HCC)    • COPD exacerbation (CMS/HCC)    • Coronary artery disease    • Depression with anxiety    • Eczema    •  Elevated PSA    • GERD (gastroesophageal reflux disease)    • Heart block    • Hemorrhoids    • Hyperglycemia    • Hyperlipidemia    • Hypertension    • Inability to attain erection    • Insomnia    • Nephrolithiasis    • Obstructive chronic bronchitis (CMS/[facility])    • RYAN (obstructive sleep apnea)    • Osteoarthritis    • Other fatigue    • Pacemaker    • Prostate cancer (CMS/HCC) 05/2017   • Prostate cancer (CMS/[facility])    • Rotator cuff tendonitis, right    • Syncope        Past Surgical History:   Procedure Laterality Date   • CARDIAC CATHETERIZATION N/A 4/17/2019    Procedure: LEFT HEART CATH;  Surgeon: Lalo Naranjo MD;  Location:  [facility];  Service: Cardiovascular   • CARDIAC CATHETERIZATION N/A 4/17/2019    Procedure: CORONARY ANGIOGRAPHY;  Surgeon: Lalo Naranjo MD;  Location:  [facility];  Service: Cardiovascular   • CARDIAC CATHETERIZATION N/A 4/17/2019    Procedure: Left ventriculography;  Surgeon: Lalo Naranjo MD;  Location:  [facility];  Service: Cardiovascular   • CARDIAC ELECTROPHYSIOLOGY PROCEDURE Left 4/18/2019    Procedure: Pacemaker DC new;  Surgeon: Curtis Moore DO;  Location:  [facility];  Service: Cardiovascular   • COLONOSCOPY  04/2017   • CORONARY ANGIOPLASTY WITH STENT PLACEMENT     • PACEMAKER IMPLANTATION     • PENILE PROSTHESIS IMPLANT     • PENIS SURGERY      Penile implant   • PROSTATE BIOPSY  01/2015    histroy of needle biopsy of prostate - normal biopsy       Family History   Problem Relation Age of Onset   • No Known Problems Mother    • No Known Problems Father        Social History     Tobacco Use   • Smoking status: Former Smoker   • Smokeless tobacco: Never Used   • Tobacco comment: Quit 2000   Substance Use Topics   • Alcohol use: No     Frequency: Never            Objective     Vitals:    06/16/20 1017   BP: 111/68   Pulse: 67   Temp: 98.4 °F (36.9 °C)   SpO2: 92%     Body mass index is  31.5 kg/m².    Physical Exam    Lab Results   Component Value Date    GLUCOSE 125 (H) 07/21/2019    BUN 13 12/10/2019    CREATININE 0.97 12/10/2019    EGFRIFNONA 76 12/10/2019    EGFRIFAFRI 92 12/10/2019    BCR 13.4 12/10/2019    K 4.7 12/10/2019    CO2 30.1 (H) 12/10/2019    CALCIUM 9.1 12/10/2019    PROTENTOTREF 7.2 12/10/2019    ALBUMIN 4.50 12/10/2019    LABIL2 1.7 12/10/2019    AST 19 12/10/2019    ALT 15 12/10/2019       WBC   Date Value Ref Range Status   07/21/2019 4.72 3.40 - 10.80 10*3/mm3 Final     RBC   Date Value Ref Range Status   07/21/2019 4.71 4.14 - 5.80 10*6/mm3 Final     Hemoglobin   Date Value Ref Range Status   07/21/2019 15.2 13.0 - 17.7 g/dL Final     Hematocrit   Date Value Ref Range Status   07/21/2019 45.8 37.5 - 51.0 % Final     MCV   Date Value Ref Range Status   07/21/2019 97.2 (H) 79.0 - 97.0 fL Final     MCH   Date Value Ref Range Status   07/21/2019 32.3 26.6 - 33.0 pg Final     MCHC   Date Value Ref Range Status   07/21/2019 33.2 31.5 - 35.7 g/dL Final     RDW   Date Value Ref Range Status   07/21/2019 12.4 12.3 - 15.4 % Final     RDW-SD   Date Value Ref Range Status   07/21/2019 44.6 37.0 - 54.0 fl Final     MPV   Date Value Ref Range Status   07/21/2019 10.0 6.0 - 12.0 fL Final     Platelets   Date Value Ref Range Status   07/21/2019 115 (L) 140 - 450 10*3/mm3 Final     Neutrophil %   Date Value Ref Range Status   07/21/2019 60.2 42.7 - 76.0 % Final     Lymphocyte %   Date Value Ref Range Status   07/21/2019 32.2 19.6 - 45.3 % Final     Monocyte %   Date Value Ref Range Status   07/21/2019 6.4 5.0 - 12.0 % Final     Eosinophil %   Date Value Ref Range Status   07/21/2019 0.8 0.3 - 6.2 % Final     Basophil %   Date Value Ref Range Status   07/21/2019 0.2 0.0 - 1.5 % Final     Immature Grans %   Date Value Ref Range Status   08/18/2016 0.0 0.0 - 0.5 % Final     Neutrophils, Absolute   Date Value Ref Range Status   07/21/2019 2.84 1.70 - 7.00 10*3/mm3 Final     Lymphocytes, Absolute    Date Value Ref Range Status   07/21/2019 1.52 0.70 - 3.10 10*3/mm3 Final     Monocytes, Absolute   Date Value Ref Range Status   07/21/2019 0.30 0.10 - 0.90 10*3/mm3 Final     Eosinophils, Absolute   Date Value Ref Range Status   07/21/2019 0.04 0.00 - 0.40 10*3/mm3 Final     Basophils, Absolute   Date Value Ref Range Status   07/21/2019 0.01 0.00 - 0.20 10*3/mm3 Final     Immature Grans, Absolute   Date Value Ref Range Status   08/18/2016 0.00 0.00 - 0.03 10*3/mm3 Final       Lab Results   Component Value Date    HGBA1C 5.80 (H) 12/10/2019       No results found for: [id number]    TSH   Date Value Ref Range Status   04/15/2019 1.030 0.270 - 4.200 mIU/mL Final       Lab Results   Component Value Date    CHOL 149 04/18/2019     Lab Results   Component Value Date    TRIG 150 12/10/2019     Lab Results   Component Value Date    HDL 41 12/10/2019     Lab Results   Component Value Date    LDL 76 12/10/2019     Lab Results   Component Value Date    VLDL 30 12/10/2019     Lab Results   Component Value Date    LDLHDL 2.84 04/18/2019         Procedures    Assessment/Plan   Problems Addressed this Visit        Cardiovascular and Mediastinum    Mixed hyperlipidemia    Relevant Orders    Comprehensive Metabolic Panel    Lipid Panel With / Chol / HDL Ratio    Essential hypertension    Relevant Medications    losartan-hydrochlorothiazide (HYZAAR) 100-25 MG per tablet    Paroxysmal atrial fibrillation (CMS/HCC)       Other    Hyperglycemia - Primary    Relevant Orders    Hemoglobin A1c      HTN controlled.  Continue meds.    Continue anticoagulation.      Orders Placed This Encounter   Procedures   • Comprehensive Metabolic Panel   • Hemoglobin A1c   • Lipid Panel With / Chol / HDL Ratio       Current Outpatient Medications   Medication Sig Dispense Refill   • atorvastatin (LIPITOR) 20 MG tablet Take 1 tablet by mouth Every Night. 30 tablet 5   • cetirizine (ZyrTEC) 10 MG tablet Take 10 mg by mouth daily.     • clopidogrel  (PLAVIX) 75 MG tablet Take 1 tablet by mouth Daily. 30 tablet 5   • losartan-hydrochlorothiazide (HYZAAR) 100-25 MG per tablet Take 0.5 tablets by mouth Daily. 45 tablet 3   • meclizine (ANTIVERT) 25 MG tablet Take 1 tablet by mouth 3 (Three) Times a Day As Needed for dizziness. 15 tablet 0   • metoprolol succinate XL (TOPROL-XL) 25 MG 24 hr tablet Take 25 mg by mouth Daily.     • nitroglycerin (NITROSTAT) 0.4 MG SL tablet Place 1 tablet under the tongue Every 5 (Five) Minutes As Needed for Chest Pain (Chest Pain With Systolic Blood Pressure Greater Than 100). 30 tablet 12   • pantoprazole (PROTONIX) 40 MG EC tablet Take 1 tablet by mouth Daily. 90 tablet 3   • QUEtiapine (SEROquel) 100 MG tablet TAKE 1 TABLET BY MOUTH EVERY NIGHT 30 tablet 5   • VENTOLIN  (90 BASE) MCG/ACT inhaler INL 2 PFS PO Q 4 H PRN  3   • cefprozil (CEFZIL) 500 MG tablet Take 1 tablet by mouth 2 (Two) Times a Day. 20 tablet 0   • warfarin (Coumadin) 3 MG tablet One a day 90 tablet 3     No current facility-administered medications for this visit.        Godfrey Shah had no medications administered during this visit.    Return in about 5 months (around 11/16/2020).    There are no Patient Instructions on file for this visit.

## 2020-06-17 LAB
ALBUMIN SERPL-MCNC: 4.4 G/DL (ref 3.5–5.2)
ALBUMIN/GLOB SERPL: 1.8 G/DL
ALP SERPL-CCNC: 90 U/L (ref 39–117)
ALT SERPL-CCNC: 19 U/L (ref 1–41)
AST SERPL-CCNC: 20 U/L (ref 1–40)
BILIRUB SERPL-MCNC: 1.3 MG/DL (ref 0.2–1.2)
BUN SERPL-MCNC: 11 MG/DL (ref 8–23)
BUN/CREAT SERPL: 10.8 (ref 7–25)
CALCIUM SERPL-MCNC: 8.6 MG/DL (ref 8.6–10.5)
CHLORIDE SERPL-SCNC: 103 MMOL/L (ref 98–107)
CHOLEST SERPL-MCNC: 130 MG/DL (ref 0–200)
CHOLEST/HDLC SERPL: 3.82 {RATIO}
CO2 SERPL-SCNC: 27.4 MMOL/L (ref 22–29)
CREAT SERPL-MCNC: 1.02 MG/DL (ref 0.76–1.27)
GLOBULIN SER CALC-MCNC: 2.4 GM/DL
GLUCOSE SERPL-MCNC: 112 MG/DL (ref 65–99)
HBA1C MFR BLD: 6 % (ref 4.8–5.6)
HDLC SERPL-MCNC: 34 MG/DL (ref 40–60)
LDLC SERPL CALC-MCNC: 59 MG/DL (ref 0–100)
POTASSIUM SERPL-SCNC: 4.2 MMOL/L (ref 3.5–5.2)
PROT SERPL-MCNC: 6.8 G/DL (ref 6–8.5)
SODIUM SERPL-SCNC: 140 MMOL/L (ref 136–145)
TRIGL SERPL-MCNC: 187 MG/DL (ref 0–150)
VLDLC SERPL CALC-MCNC: 37.4 MG/DL

## 2020-07-23 ENCOUNTER — TRANSCRIBE ORDERS (OUTPATIENT)
Dept: ADMINISTRATIVE | Facility: HOSPITAL | Age: 75
End: 2020-07-23

## 2020-07-23 DIAGNOSIS — R59.0 MEDIASTINAL ADENOPATHY: Primary | ICD-10-CM

## 2020-08-06 ENCOUNTER — HOSPITAL ENCOUNTER (OUTPATIENT)
Dept: CT IMAGING | Facility: HOSPITAL | Age: 75
Discharge: HOME | End: 2020-08-06
Admitting: INTERNAL MEDICINE

## 2020-08-06 DIAGNOSIS — R59.0 MEDIASTINAL ADENOPATHY: ICD-10-CM

## 2020-08-06 LAB — CREAT BLDA-MCNC: 1.1 MG/DL (ref 0.6–1.3)

## 2020-08-06 PROCEDURE — 71260 CT THORAX DX C+: CPT

## 2020-08-06 PROCEDURE — 82565 ASSAY OF CREATININE: CPT

## 2020-08-06 PROCEDURE — 25010000002 IOPAMIDOL 61 % SOLUTION: Performed by: INTERNAL MEDICINE

## 2020-08-06 RX ADMIN — IOPAMIDOL 75 ML: 612 INJECTION, SOLUTION INTRAVENOUS at 06:50

## 2020-09-23 ENCOUNTER — TELEPHONE (OUTPATIENT)
Dept: FAMILY MEDICINE CLINIC | Facility: CLINIC | Age: 75
End: 2020-09-23

## 2020-11-18 ENCOUNTER — OFFICE VISIT (OUTPATIENT)
Dept: FAMILY MEDICINE CLINIC | Facility: CLINIC | Age: 75
End: 2020-11-18

## 2020-11-18 VITALS
SYSTOLIC BLOOD PRESSURE: 116 MMHG | WEIGHT: 211 LBS | OXYGEN SATURATION: 95 % | BODY MASS INDEX: 31.25 KG/M2 | HEIGHT: 69 IN | HEART RATE: 61 BPM | TEMPERATURE: 97.5 F | DIASTOLIC BLOOD PRESSURE: 64 MMHG

## 2020-11-18 DIAGNOSIS — C61 PROSTATE CANCER (HCC): ICD-10-CM

## 2020-11-18 DIAGNOSIS — I25.10 CORONARY ARTERY DISEASE INVOLVING NATIVE CORONARY ARTERY OF NATIVE HEART WITHOUT ANGINA PECTORIS: ICD-10-CM

## 2020-11-18 DIAGNOSIS — I48.0 PAROXYSMAL ATRIAL FIBRILLATION (HCC): ICD-10-CM

## 2020-11-18 DIAGNOSIS — R73.9 HYPERGLYCEMIA: ICD-10-CM

## 2020-11-18 DIAGNOSIS — I10 ESSENTIAL HYPERTENSION: Primary | ICD-10-CM

## 2020-11-18 PROCEDURE — 99214 OFFICE O/P EST MOD 30 MIN: CPT | Performed by: FAMILY MEDICINE

## 2020-11-18 RX ORDER — QUETIAPINE FUMARATE 100 MG/1
100 TABLET, FILM COATED ORAL NIGHTLY
Qty: 30 TABLET | Refills: 5 | Status: SHIPPED | OUTPATIENT
Start: 2020-11-18 | End: 2021-08-24

## 2020-11-18 RX ORDER — ATORVASTATIN CALCIUM 20 MG/1
20 TABLET, FILM COATED ORAL NIGHTLY
Qty: 90 TABLET | Refills: 3 | Status: SHIPPED | OUTPATIENT
Start: 2020-11-18 | End: 2021-11-11 | Stop reason: SDUPTHER

## 2020-11-18 RX ORDER — ASPIRIN 81 MG/1
81 TABLET ORAL DAILY
COMMUNITY

## 2020-11-18 NOTE — PROGRESS NOTES
Chief Complaint   Patient presents with   • Hyperlipidemia   • COPD       Subjective   Godfrey Shah is a 75 y.o. male.     History of Present Illness   F/u HTN.  I am taking only 1/2 of losartan/hctz.  It is doing well.    F/U CAD.  Stents placed last 4/19.   On aspirin only.  On lipitor 20 once a day.  LDL 59 5 months ago.    F/U Afib.    On coumadin 3mg once a day.   INR 2.2 9 days ago, per pt.    F/u insomnia.  Doing well with seroquel 100 once a day.   F/U hyperglycemia.  No triad of diabetes.      The following portions of the patient's history were reviewed and updated as appropriate: allergies, current medications, past family history, past medical history, past social history, past surgical history and problem list.    Review of Systems   Constitutional: Negative for appetite change and fatigue.   HENT: Negative for nosebleeds and sore throat.    Eyes: Negative for blurred vision and visual disturbance.   Respiratory: Negative for shortness of breath and wheezing.    Cardiovascular: Negative for chest pain and leg swelling.   Gastrointestinal: Negative for abdominal distention and abdominal pain.   Endocrine: Negative for cold intolerance and polyuria.   Genitourinary: Negative for dysuria and hematuria.   Musculoskeletal: Negative for arthralgias and myalgias.   Skin: Negative for color change and rash.   Neurological: Negative for weakness and confusion.   Psychiatric/Behavioral: Negative for agitation and depressed mood.       Patient Active Problem List   Diagnosis   • Bradycardia, sinus   • Sleep-disordered breathing   • Symptomatic bradycardia   • AV block   • Prostate cancer (CMS/[facility])   • Pacemaker   • Insomnia   • RYAN (obstructive sleep apnea)   • Nephrolithiasis   • Hyperglycemia   • GERD (gastroesophageal reflux disease)   • Osteoarthritis   • COPD (chronic obstructive pulmonary disease) (CMS/[facility])   • Inability to attain erection   • Coronary artery disease   • Visit for suture removal   •  Medicare annual wellness visit, subsequent   • Mixed hyperlipidemia   • COPD with acute exacerbation (CMS/HCC)   • Essential hypertension   • Paroxysmal atrial fibrillation (CMS/HCC)       No Known Allergies      Current Outpatient Medications:   •  aspirin (ASPIR) 81 MG EC tablet, Take 81 mg by mouth Daily., Disp: , Rfl:   •  atorvastatin (LIPITOR) 20 MG tablet, Take 1 tablet by mouth Every Night., Disp: 90 tablet, Rfl: 3  •  cetirizine (ZyrTEC) 10 MG tablet, Take 10 mg by mouth daily., Disp: , Rfl:   •  losartan-hydrochlorothiazide (HYZAAR) 100-25 MG per tablet, Take 0.5 tablets by mouth Daily., Disp: 45 tablet, Rfl: 3  •  metoprolol succinate XL (TOPROL-XL) 25 MG 24 hr tablet, Take 25 mg by mouth Daily., Disp: , Rfl:   •  nitroglycerin (NITROSTAT) 0.4 MG SL tablet, Place 1 tablet under the tongue Every 5 (Five) Minutes As Needed for Chest Pain (Chest Pain With Systolic Blood Pressure Greater Than 100)., Disp: 30 tablet, Rfl: 12  •  pantoprazole (PROTONIX) 40 MG EC tablet, Take 1 tablet by mouth Daily., Disp: 90 tablet, Rfl: 3  •  QUEtiapine (SEROquel) 100 MG tablet, Take 1 tablet by mouth Every Night., Disp: 30 tablet, Rfl: 5  •  VENTOLIN  (90 BASE) MCG/ACT inhaler, INL 2 PFS PO Q 4 H PRN, Disp: , Rfl: 3  •  warfarin (Coumadin) 3 MG tablet, One a day, Disp: 90 tablet, Rfl: 3  •  meclizine (ANTIVERT) 25 MG tablet, Take 1 tablet by mouth 3 (Three) Times a Day As Needed for dizziness., Disp: 15 tablet, Rfl: 0    Past Medical History:   Diagnosis Date   • Acute bronchitis    • Allergic rhinitis    • Anxiety    • Arm mass, right    • ASCVD (arteriosclerotic cardiovascular disease)    • AV block    • Biceps muscle tear    • BPPV (benign paroxysmal positional vertigo)    • Bradycardia    • CAD (coronary artery disease)    • Colon polyps    • COPD (chronic obstructive pulmonary disease) (CMS/HCC)    • COPD exacerbation (CMS/HCC)    • Coronary artery disease    • Depression with anxiety    • Eczema    • Elevated  PSA    • GERD (gastroesophageal reflux disease)    • Heart block    • Hemorrhoids    • Hyperglycemia    • Hyperlipidemia    • Hypertension    • Inability to attain erection    • Insomnia    • Nephrolithiasis    • Obstructive chronic bronchitis (CMS/[facility])    • RYAN (obstructive sleep apnea)    • Osteoarthritis    • Other fatigue    • Pacemaker    • Prostate cancer (CMS/HCC) 05/2017   • Prostate cancer (CMS/[facility])    • Rotator cuff tendonitis, right    • Syncope        Past Surgical History:   Procedure Laterality Date   • CARDIAC CATHETERIZATION N/A 4/17/2019    Procedure: LEFT HEART CATH;  Surgeon: Lalo Naranjo MD;  Location:  [facility] CATH INVASIVE LOCATION;  Service: Cardiovascular   • CARDIAC CATHETERIZATION N/A 4/17/2019    Procedure: CORONARY ANGIOGRAPHY;  Surgeon: Lalo Naranjo MD;  Location:  [facility] CATH INVASIVE LOCATION;  Service: Cardiovascular   • CARDIAC CATHETERIZATION N/A 4/17/2019    Procedure: Left ventriculography;  Surgeon: Lalo Naranjo MD;  Location:  [facility] CATH INVASIVE LOCATION;  Service: Cardiovascular   • CARDIAC ELECTROPHYSIOLOGY PROCEDURE Left 4/18/2019    Procedure: Pacemaker DC new;  Surgeon: Curtis Moore DO;  Location: [facility] CATH INVASIVE LOCATION;  Service: Cardiovascular   • COLONOSCOPY  04/2017   • CORONARY ANGIOPLASTY WITH STENT PLACEMENT     • PACEMAKER IMPLANTATION     • PENILE PROSTHESIS IMPLANT     • PENIS SURGERY      Penile implant   • PROSTATE BIOPSY  01/2015    histroy of needle biopsy of prostate - normal biopsy       Family History   Problem Relation Age of Onset   • No Known Problems Mother    • No Known Problems Father        Social History     Tobacco Use   • Smoking status: Former Smoker   • Smokeless tobacco: Never Used   • Tobacco comment: Quit 2000   Substance Use Topics   • Alcohol use: No     Frequency: Never            Objective     Vitals:    11/18/20 0907   BP: 116/64   Pulse: 61   Temp: 97.5 °F (36.4 °C)   SpO2: 95%     Body mass index is 31.62  kg/m².    Physical Exam  Vitals signs reviewed.   Constitutional:       Appearance: He is well-developed. He is not diaphoretic.   HENT:      Head: Normocephalic and atraumatic.   Eyes:      General: No scleral icterus.     Pupils: Pupils are equal, round, and reactive to light.   Neck:      Thyroid: No thyromegaly.   Cardiovascular:      Rate and Rhythm: Normal rate and regular rhythm.      Heart sounds: No murmur. No friction rub. No gallop.    Pulmonary:      Effort: Pulmonary effort is normal. No respiratory distress.      Breath sounds: No wheezing or rales.   Chest:      Chest wall: No tenderness.   Abdominal:      General: Bowel sounds are normal. There is no distension.      Palpations: Abdomen is soft.      Tenderness: There is no abdominal tenderness.   Musculoskeletal: Normal range of motion.         General: No deformity.   Lymphadenopathy:      Cervical: No cervical adenopathy.   Skin:     General: Skin is warm and dry.      Findings: No rash.   Neurological:      Cranial Nerves: No cranial nerve deficit.      Motor: No abnormal muscle tone.         Lab Results   Component Value Date    GLUCOSE 125 (H) 07/21/2019    BUN 11 06/16/2020    CREATININE 1.10 08/06/2020    EGFRIFNONA 71 06/16/2020    EGFRIFAFRI 86 06/16/2020    BCR 10.8 06/16/2020    K 4.2 06/16/2020    CO2 27.4 06/16/2020    CALCIUM 8.6 06/16/2020    PROTENTOTREF 6.8 06/16/2020    ALBUMIN 4.40 06/16/2020    LABIL2 1.8 06/16/2020    AST 20 06/16/2020    ALT 19 06/16/2020       WBC   Date Value Ref Range Status   07/21/2019 4.72 3.40 - 10.80 10*3/mm3 Final     RBC   Date Value Ref Range Status   07/21/2019 4.71 4.14 - 5.80 10*6/mm3 Final     Hemoglobin   Date Value Ref Range Status   07/21/2019 15.2 13.0 - 17.7 g/dL Final     Hematocrit   Date Value Ref Range Status   07/21/2019 45.8 37.5 - 51.0 % Final     MCV   Date Value Ref Range Status   07/21/2019 97.2 (H) 79.0 - 97.0 fL Final     MCH   Date Value Ref Range Status   07/21/2019 32.3 26.6 -  33.0 pg Final     MCHC   Date Value Ref Range Status   07/21/2019 33.2 31.5 - 35.7 g/dL Final     RDW   Date Value Ref Range Status   07/21/2019 12.4 12.3 - 15.4 % Final     RDW-SD   Date Value Ref Range Status   07/21/2019 44.6 37.0 - 54.0 fl Final     MPV   Date Value Ref Range Status   07/21/2019 10.0 6.0 - 12.0 fL Final     Platelets   Date Value Ref Range Status   07/21/2019 115 (L) 140 - 450 10*3/mm3 Final     Neutrophil %   Date Value Ref Range Status   07/21/2019 60.2 42.7 - 76.0 % Final     Lymphocyte %   Date Value Ref Range Status   07/21/2019 32.2 19.6 - 45.3 % Final     Monocyte %   Date Value Ref Range Status   07/21/2019 6.4 5.0 - 12.0 % Final     Eosinophil %   Date Value Ref Range Status   07/21/2019 0.8 0.3 - 6.2 % Final     Basophil %   Date Value Ref Range Status   07/21/2019 0.2 0.0 - 1.5 % Final     Immature Grans %   Date Value Ref Range Status   08/18/2016 0.0 0.0 - 0.5 % Final     Neutrophils, Absolute   Date Value Ref Range Status   07/21/2019 2.84 1.70 - 7.00 10*3/mm3 Final     Lymphocytes, Absolute   Date Value Ref Range Status   07/21/2019 1.52 0.70 - 3.10 10*3/mm3 Final     Monocytes, Absolute   Date Value Ref Range Status   07/21/2019 0.30 0.10 - 0.90 10*3/mm3 Final     Eosinophils, Absolute   Date Value Ref Range Status   07/21/2019 0.04 0.00 - 0.40 10*3/mm3 Final     Basophils, Absolute   Date Value Ref Range Status   07/21/2019 0.01 0.00 - 0.20 10*3/mm3 Final     Immature Grans, Absolute   Date Value Ref Range Status   08/18/2016 0.00 0.00 - 0.03 10*3/mm3 Final       Lab Results   Component Value Date    HGBA1C 6.00 (H) 06/16/2020       No results found for: [id number]    TSH   Date Value Ref Range Status   04/15/2019 1.030 0.270 - 4.200 mIU/mL Final       Lab Results   Component Value Date    CHOL 149 04/18/2019     Lab Results   Component Value Date    TRIG 187 (H) 06/16/2020     Lab Results   Component Value Date    HDL 34 (L) 06/16/2020     Lab Results   Component Value Date     LDL 59 06/16/2020     Lab Results   Component Value Date    VLDL 37.4 06/16/2020     Lab Results   Component Value Date    LDLHDL 2.84 04/18/2019         Procedures    Assessment/Plan   Problems Addressed this Visit        Cardiovascular and Mediastinum    Coronary artery disease    Relevant Medications    atorvastatin (LIPITOR) 20 MG tablet    Other Relevant Orders    Comprehensive Metabolic Panel    Essential hypertension - Primary    Relevant Orders    Comprehensive Metabolic Panel    Paroxysmal atrial fibrillation (CMS/[facility])       Genitourinary    Prostate cancer (CMS/[facility])    Relevant Orders    PSA DIAGNOSTIC       Other    Hyperglycemia    Relevant Orders    Hemoglobin A1c      Diagnoses       Codes Comments    Essential hypertension    -  Primary ICD-10-CM: I10  ICD-9-CM: 401.9     Coronary artery disease involving native coronary artery of native heart without angina pectoris     ICD-10-CM: I25.10  ICD-9-CM: 414.01     Paroxysmal atrial fibrillation (CMS/[facility])     ICD-10-CM: I48.0  ICD-9-CM: 427.31     Hyperglycemia     ICD-10-CM: R73.9  ICD-9-CM: 790.29     Prostate cancer (CMS/[facility])     ICD-10-CM: C61  ICD-9-CM: 185           Orders Placed This Encounter   Procedures   • Comprehensive Metabolic Panel   • Hemoglobin A1c   • PSA DIAGNOSTIC       Current Outpatient Medications   Medication Sig Dispense Refill   • aspirin (ASPIR) 81 MG EC tablet Take 81 mg by mouth Daily.     • atorvastatin (LIPITOR) 20 MG tablet Take 1 tablet by mouth Every Night. 90 tablet 3   • cetirizine (ZyrTEC) 10 MG tablet Take 10 mg by mouth daily.     • losartan-hydrochlorothiazide (HYZAAR) 100-25 MG per tablet Take 0.5 tablets by mouth Daily. 45 tablet 3   • metoprolol succinate XL (TOPROL-XL) 25 MG 24 hr tablet Take 25 mg by mouth Daily.     • nitroglycerin (NITROSTAT) 0.4 MG SL tablet Place 1 tablet under the tongue Every 5 (Five) Minutes As Needed for Chest Pain (Chest Pain With Systolic Blood Pressure Greater Than 100). 30 tablet 12    • pantoprazole (PROTONIX) 40 MG EC tablet Take 1 tablet by mouth Daily. 90 tablet 3   • QUEtiapine (SEROquel) 100 MG tablet Take 1 tablet by mouth Every Night. 30 tablet 5   • VENTOLIN  (90 BASE) MCG/ACT inhaler INL 2 PFS PO Q 4 H PRN  3   • warfarin (Coumadin) 3 MG tablet One a day 90 tablet 3   • meclizine (ANTIVERT) 25 MG tablet Take 1 tablet by mouth 3 (Three) Times a Day As Needed for dizziness. 15 tablet 0     No current facility-administered medications for this visit.        Godfrey Shah had no medications administered during this visit.    No follow-ups on file.    There are no Patient Instructions on file for this visit.

## 2020-11-19 LAB
ALBUMIN SERPL-MCNC: 4.3 G/DL (ref 3.5–5.2)
ALBUMIN/GLOB SERPL: 1.7 G/DL
ALP SERPL-CCNC: 86 U/L (ref 39–117)
ALT SERPL-CCNC: 26 U/L (ref 1–41)
AST SERPL-CCNC: 25 U/L (ref 1–40)
BILIRUB SERPL-MCNC: 1.6 MG/DL (ref 0–1.2)
BUN SERPL-MCNC: 18 MG/DL (ref 8–23)
BUN/CREAT SERPL: 17.6 (ref 7–25)
CALCIUM SERPL-MCNC: 8.8 MG/DL (ref 8.6–10.5)
CHLORIDE SERPL-SCNC: 104 MMOL/L (ref 98–107)
CO2 SERPL-SCNC: 29.3 MMOL/L (ref 22–29)
CREAT SERPL-MCNC: 1.02 MG/DL (ref 0.76–1.27)
GLOBULIN SER CALC-MCNC: 2.5 GM/DL
GLUCOSE SERPL-MCNC: 117 MG/DL (ref 65–99)
HBA1C MFR BLD: 6.1 % (ref 4.8–5.6)
POTASSIUM SERPL-SCNC: 3.8 MMOL/L (ref 3.5–5.2)
PROT SERPL-MCNC: 6.8 G/DL (ref 6–8.5)
PSA SERPL-MCNC: 0.62 NG/ML (ref 0–4)
SODIUM SERPL-SCNC: 143 MMOL/L (ref 136–145)

## 2021-01-29 ENCOUNTER — TELEPHONE (OUTPATIENT)
Dept: FAMILY MEDICINE CLINIC | Facility: CLINIC | Age: 76
End: 2021-01-29

## 2021-01-29 NOTE — TELEPHONE ENCOUNTER
Caller: Godfrey Shah    Relationship: Self    Best call back number: [phone]      What medication are you requesting: METFORMIN    What are your current symptoms: HIGH BLOOD SUGAR    If a prescription is needed, what is your preferred pharmacy and phone number:  [place], [place] PH:[phone]  FAX:[phone]    Additional notes:PATIENT WENT TO HEART DR TODAY AND DR TOLD HIM HIS BLOOD SUGAR WAS HIGH AND RECOMMENDED HIM REQUESTING METFORMIN FROM HIS PCP

## 2021-01-29 NOTE — TELEPHONE ENCOUNTER
Tell him I sent it over to his [facility].  Take one with breakfast only and see me with repeat labs in 3 months.

## 2021-02-01 NOTE — TELEPHONE ENCOUNTER
Hub ok to read:    Metformin was sent to pharmacy.  Take daily with breakfast only.  Will do labs at May appointment.

## 2021-05-19 ENCOUNTER — OFFICE VISIT (OUTPATIENT)
Dept: FAMILY MEDICINE CLINIC | Facility: CLINIC | Age: 76
End: 2021-05-19

## 2021-05-19 VITALS
HEART RATE: 60 BPM | TEMPERATURE: 97.8 F | BODY MASS INDEX: 31.1 KG/M2 | OXYGEN SATURATION: 96 % | WEIGHT: 210 LBS | SYSTOLIC BLOOD PRESSURE: 119 MMHG | HEIGHT: 69 IN | DIASTOLIC BLOOD PRESSURE: 66 MMHG

## 2021-05-19 DIAGNOSIS — C61 PROSTATE CANCER (HCC): ICD-10-CM

## 2021-05-19 DIAGNOSIS — R73.9 HYPERGLYCEMIA: ICD-10-CM

## 2021-05-19 DIAGNOSIS — Z00.00 MEDICARE ANNUAL WELLNESS VISIT, SUBSEQUENT: Primary | ICD-10-CM

## 2021-05-19 DIAGNOSIS — E78.2 MIXED HYPERLIPIDEMIA: ICD-10-CM

## 2021-05-19 DIAGNOSIS — H25.89 OTHER AGE-RELATED CATARACT, UNSPECIFIED LATERALITY: ICD-10-CM

## 2021-05-19 DIAGNOSIS — I10 ESSENTIAL HYPERTENSION: ICD-10-CM

## 2021-05-19 PROCEDURE — G0439 PPPS, SUBSEQ VISIT: HCPCS | Performed by: FAMILY MEDICINE

## 2021-05-19 PROCEDURE — 99214 OFFICE O/P EST MOD 30 MIN: CPT | Performed by: FAMILY MEDICINE

## 2021-05-19 RX ORDER — PANTOPRAZOLE SODIUM 40 MG/1
40 TABLET, DELAYED RELEASE ORAL DAILY
Qty: 90 TABLET | Refills: 3 | Status: SHIPPED | OUTPATIENT
Start: 2021-05-19 | End: 2022-02-14 | Stop reason: SDUPTHER

## 2021-05-19 RX ORDER — LOSARTAN POTASSIUM AND HYDROCHLOROTHIAZIDE 25; 100 MG/1; MG/1
0.5 TABLET ORAL DAILY
Qty: 45 TABLET | Refills: 3 | Status: SHIPPED | OUTPATIENT
Start: 2021-05-19 | End: 2022-02-14

## 2021-05-19 NOTE — PROGRESS NOTES
The ABCs of the Annual Wellness Visit  Subsequent Medicare Wellness Visit    Chief Complaint   Patient presents with   • Medicare Wellness-subsequent       Subjective   History of Present Illness:  Godfrey Shah is a 75 y.o. male who presents for a Subsequent Medicare Wellness Visit.  F/u hyperlipdiemia.  No myalgia.    F/U hyperglycemia.  Doing well with metformin.  No SE.   F/U HTN.  No orthostasis.    HEALTH RISK ASSESSMENT    Recent Hospitalizations:  No hospitalization(s) within the last year.    Current Medical Providers:  Patient Care Team:  Godfrey Jackson MD as PCP - General (Family Medicine)    Smoking Status:  Social History     Tobacco Use   Smoking Status Former Smoker   Smokeless Tobacco Never Used   Tobacco Comment    Quit 2000       Alcohol Consumption:  Social History     Substance and Sexual Activity   Alcohol Use No       Depression Screen:   PHQ-2/PHQ-9 Depression Screening 11/18/2020   Little interest or pleasure in doing things 0   Feeling down, depressed, or hopeless 0   Total Score 0       Fall Risk Screen:  STEADI Fall Risk Assessment has not been completed.    Health Habits and Functional and Cognitive Screening:  Functional & Cognitive Status 5/19/2021   Do you have difficulty preparing food and eating? No   Do you have difficulty bathing yourself, getting dressed or grooming yourself? No   Do you have difficulty using the toilet? No   Do you have difficulty moving around from place to place? No   Do you have trouble with steps or getting out of a bed or a chair? No   Current Diet Well Balanced Diet   Dental Exam Up to date   Eye Exam Not up to date   Exercise (times per week) 5 times per week   Current Exercises Include Other;Walking        Exercise Comment bowling   Current Exercise Activities Include -   Do you need help using the phone?  No   Are you deaf or do you have serious difficulty hearing?  No   Do you need help with transportation? No   Do you need help shopping? No   Do  you need help preparing meals?  No   Do you need help with housework?  No   Do you need help with laundry? No   Do you need help taking your medications? No   Do you need help managing money? No   Do you ever drive or ride in a car without wearing a seat belt? No   Have you felt unusual stress, anger or loneliness in the last month? No   Who do you live with? Spouse   If you need help, do you have trouble finding someone available to you? No   Have you been bothered in the last four weeks by sexual problems? -   Do you have difficulty concentrating, remembering or making decisions? No         Does the patient have evidence of cognitive impairment? No    Asprin use counseling:Taking ASA appropriately as indicated    Age-appropriate Screening Schedule:  Refer to the list below for future screening recommendations based on patient's age, sex and/or medical conditions. Orders for these recommended tests are listed in the plan section. The patient has been provided with a written plan.    Health Maintenance   Topic Date Due   • ZOSTER VACCINE (2 of 3) 09/24/2012   • LIPID PANEL  06/16/2021   • INFLUENZA VACCINE  08/01/2021   • TDAP/TD VACCINES (2 - Tdap) 06/16/2026          The following portions of the patient's history were reviewed and updated as appropriate: allergies, current medications, past family history, past medical history, past social history, past surgical history and problem list.    Outpatient Medications Prior to Visit   Medication Sig Dispense Refill   • aspirin (ASPIR) 81 MG EC tablet Take 81 mg by mouth Daily.     • atorvastatin (LIPITOR) 20 MG tablet Take 1 tablet by mouth Every Night. 90 tablet 3   • cetirizine (ZyrTEC) 10 MG tablet Take 10 mg by mouth daily.     • metFORMIN (GLUCOPHAGE) 500 MG tablet Take 1 tablet by mouth Daily With Breakfast. 30 tablet 11   • metoprolol succinate XL (TOPROL-XL) 25 MG 24 hr tablet Take 25 mg by mouth Daily.     • nitroglycerin (NITROSTAT) 0.4 MG SL tablet Place 1  tablet under the tongue Every 5 (Five) Minutes As Needed for Chest Pain (Chest Pain With Systolic Blood Pressure Greater Than 100). 30 tablet 12   • QUEtiapine (SEROquel) 100 MG tablet Take 1 tablet by mouth Every Night. 30 tablet 5   • VENTOLIN  (90 BASE) MCG/ACT inhaler INL 2 PFS PO Q 4 H PRN  3   • warfarin (Coumadin) 3 MG tablet One a day 90 tablet 3   • losartan-hydrochlorothiazide (HYZAAR) 100-25 MG per tablet Take 0.5 tablets by mouth Daily. 45 tablet 3   • pantoprazole (PROTONIX) 40 MG EC tablet Take 1 tablet by mouth Daily. 90 tablet 3   • meclizine (ANTIVERT) 25 MG tablet Take 1 tablet by mouth 3 (Three) Times a Day As Needed for dizziness. 15 tablet 0     No facility-administered medications prior to visit.       Patient Active Problem List   Diagnosis   • Bradycardia, sinus   • Sleep-disordered breathing   • Symptomatic bradycardia   • AV block   • Prostate cancer (CMS/HCC)   • Pacemaker   • Insomnia   • RYAN (obstructive sleep apnea)   • Nephrolithiasis   • Hyperglycemia   • GERD (gastroesophageal reflux disease)   • Osteoarthritis   • COPD (chronic obstructive pulmonary disease) (CMS/HCC)   • Inability to attain erection   • Coronary artery disease   • Visit for suture removal   • Medicare annual wellness visit, subsequent   • Mixed hyperlipidemia   • COPD with acute exacerbation (CMS/HCC)   • Essential hypertension   • Paroxysmal atrial fibrillation (CMS/[facility])   • Other age-related cataract       Advanced Care Planning:  ACP discussion was held with the patient during this visit. Patient has an advance directive in EMR which is still valid.     Review of Systems   Constitutional: Negative for activity change, appetite change and fatigue.   HENT: Negative for hearing loss and postnasal drip.    Eyes: Negative for discharge and itching.   Respiratory: Negative for cough and shortness of breath.    Cardiovascular: Negative for chest pain and leg swelling.   Gastrointestinal: Negative for abdominal  "distention and abdominal pain.   Endocrine: Negative for cold intolerance and heat intolerance.   Genitourinary: Negative for difficulty urinating and flank pain.   Musculoskeletal: Negative for arthralgias and myalgias.   Skin: Negative for color change.   Neurological: Negative for dizziness and facial asymmetry.   Hematological: Negative for adenopathy.   Psychiatric/Behavioral: Negative for agitation and confusion.       Compared to one year ago, the patient feels his physical health is the same.  Compared to one year ago, the patient feels his mental health is the same.    Reviewed chart for potential of high risk medication in the elderly: yes  Reviewed chart for potential of harmful drug interactions in the elderly:yes    Objective         Vitals:    05/19/21 0858   BP: 119/66   BP Location: Right arm   Patient Position: Sitting   Pulse: 60   Temp: 97.8 °F (36.6 °C)   TempSrc: Temporal   SpO2: 96%   Weight: 95.3 kg (210 lb)   Height: 174 cm (68.5\")       Body mass index is 31.47 kg/m².  Discussed the patient's BMI with him. The BMI is above average; BMI management plan is completed.    Physical Exam  Vitals reviewed.   Constitutional:       Appearance: He is well-developed. He is not diaphoretic.   HENT:      Head: Normocephalic and atraumatic.   Eyes:      General: No scleral icterus.     Pupils: Pupils are equal, round, and reactive to light.   Neck:      Thyroid: No thyromegaly.   Cardiovascular:      Rate and Rhythm: Normal rate and regular rhythm.      Heart sounds: No murmur heard.   No friction rub. No gallop.    Pulmonary:      Effort: Pulmonary effort is normal. No respiratory distress.      Breath sounds: No wheezing or rales.   Chest:      Chest wall: No tenderness.   Abdominal:      General: Bowel sounds are normal. There is no distension.      Palpations: Abdomen is soft.      Tenderness: There is no abdominal tenderness.   Musculoskeletal:         General: No deformity. Normal range of motion. "   Lymphadenopathy:      Cervical: No cervical adenopathy.   Skin:     General: Skin is warm and dry.      Findings: No rash.   Neurological:      Cranial Nerves: No cranial nerve deficit.      Motor: No abnormal muscle tone.               Assessment/Plan   Medicare Risks and Personalized Health Plan  CMS Preventative Services Quick Reference  Inactivity/Sedentary    The above risks/problems have been discussed with the patient.  Pertinent information has been shared with the patient in the After Visit Summary.  Follow up plans and orders are seen below in the Assessment/Plan Section.    Diagnoses and all orders for this visit:    1. Medicare annual wellness visit, subsequent (Primary)    2. Other age-related cataract, unspecified laterality  -     Ambulatory Referral to Ophthalmology    3. Essential hypertension  -     Comprehensive Metabolic Panel  -     losartan-hydrochlorothiazide (HYZAAR) 100-25 MG per tablet; Take 0.5 tablets by mouth Daily.  Dispense: 45 tablet; Refill: 3    4. Hyperglycemia  -     Comprehensive Metabolic Panel  -     Hemoglobin A1c    5. Mixed hyperlipidemia  -     Comprehensive Metabolic Panel  -     Lipid Panel With / Chol / HDL Ratio    6. Prostate cancer (CMS/[facility])  -     PSA DIAGNOSTIC    Other orders  -     pantoprazole (PROTONIX) 40 MG EC tablet; Take 1 tablet by mouth Daily.  Dispense: 90 tablet; Refill: 3    Hyperglycemia/IFG.  Continue metformin.  Check A1c.    Hyperlipidmeia.  Check FLP.  Continue statin.    Cataract.  To optho.    HTN.  Controlled.  Continue meds.  RX sent for losartan/hctz.      Follow Up:  Return in about 6 months (around 11/19/2021).     An After Visit Summary and PPPS were given to the patient.     Preventive Counseling:  Optho recommended.  Encouraged regular exercise.   Covid utd.  Dentist utd.  SHingrix recommended.  Pneumovax utd.

## 2021-05-20 LAB
ALBUMIN SERPL-MCNC: 4.3 G/DL (ref 3.5–5.2)
ALBUMIN/GLOB SERPL: 2 G/DL
ALP SERPL-CCNC: 86 U/L (ref 39–117)
ALT SERPL-CCNC: 21 U/L (ref 1–41)
AST SERPL-CCNC: 23 U/L (ref 1–40)
BILIRUB SERPL-MCNC: 1.2 MG/DL (ref 0–1.2)
BUN SERPL-MCNC: 14 MG/DL (ref 8–23)
BUN/CREAT SERPL: 14.9 (ref 7–25)
CALCIUM SERPL-MCNC: 9.4 MG/DL (ref 8.6–10.5)
CHLORIDE SERPL-SCNC: 105 MMOL/L (ref 98–107)
CHOLEST SERPL-MCNC: 123 MG/DL (ref 0–200)
CHOLEST/HDLC SERPL: 3.24 {RATIO}
CO2 SERPL-SCNC: 27.8 MMOL/L (ref 22–29)
CREAT SERPL-MCNC: 0.94 MG/DL (ref 0.76–1.27)
GLOBULIN SER CALC-MCNC: 2.2 GM/DL
GLUCOSE SERPL-MCNC: 107 MG/DL (ref 65–99)
HBA1C MFR BLD: 6.1 % (ref 4.8–5.6)
HDLC SERPL-MCNC: 38 MG/DL (ref 40–60)
LDLC SERPL CALC-MCNC: 66 MG/DL (ref 0–100)
POTASSIUM SERPL-SCNC: 4.1 MMOL/L (ref 3.5–5.2)
PROT SERPL-MCNC: 6.5 G/DL (ref 6–8.5)
PSA SERPL-MCNC: 0.9 NG/ML (ref 0–4)
SODIUM SERPL-SCNC: 140 MMOL/L (ref 136–145)
TRIGL SERPL-MCNC: 103 MG/DL (ref 0–150)
VLDLC SERPL CALC-MCNC: 19 MG/DL (ref 5–40)

## 2021-08-24 RX ORDER — QUETIAPINE FUMARATE 100 MG/1
100 TABLET, FILM COATED ORAL NIGHTLY
Qty: 30 TABLET | Refills: 5 | Status: SHIPPED | OUTPATIENT
Start: 2021-08-24 | End: 2022-03-22 | Stop reason: SDUPTHER

## 2021-08-24 NOTE — TELEPHONE ENCOUNTER
Rx Refill Note  Requested Prescriptions     Pending Prescriptions Disp Refills   • QUEtiapine (SEROquel) 100 MG tablet [Pharmacy Med Name: QUETIAPINE 100MG TABLETS] 30 tablet 5     Sig: TAKE 1 TABLET BY MOUTH EVERY NIGHT      Last office visit with prescribing clinician: 5/19/2021      Next office visit with prescribing clinician: 11/11/2021           Last filled 11/18/2020           Jannie Tran MA  08/24/21, 10:19 EDT

## 2021-08-26 DIAGNOSIS — K64.9 HEMORRHOIDS, UNSPECIFIED HEMORRHOID TYPE: Primary | ICD-10-CM

## 2021-08-26 RX ORDER — HYDROCORTISONE 25 MG/G
CREAM TOPICAL 2 TIMES DAILY
Qty: 28 G | Refills: 11 | Status: SHIPPED | OUTPATIENT
Start: 2021-08-26 | End: 2022-11-16

## 2021-09-16 RX ORDER — WARFARIN SODIUM 3 MG/1
TABLET ORAL
Qty: 90 TABLET | Refills: 3 | Status: SHIPPED | OUTPATIENT
Start: 2021-09-16 | End: 2022-05-17 | Stop reason: SDUPTHER

## 2021-09-16 NOTE — TELEPHONE ENCOUNTER
90 DAY SUPPLY        Caller:     Relationship:     Best call back number:   Godfrey Shah (Self) [phone] (H)         Medication needed:   Requested Prescriptions     Pending Prescriptions Disp Refills   • warfarin (Coumadin) 3 MG tablet 90 tablet 3     Sig: One a day       When do you need the refill by: ASAP     What additional details did the patient provide when requesting the medication:     Does the patient have less than a 3 day supply:  [x] Yes  [] No    What is the patient's preferred pharmacy:    [facility] #[id number] [address] & [facility] - [phone] [facility] [phone]   [phone]

## 2021-11-11 ENCOUNTER — OFFICE VISIT (OUTPATIENT)
Dept: FAMILY MEDICINE CLINIC | Facility: CLINIC | Age: 76
End: 2021-11-11

## 2021-11-11 VITALS
HEIGHT: 69 IN | SYSTOLIC BLOOD PRESSURE: 126 MMHG | DIASTOLIC BLOOD PRESSURE: 62 MMHG | TEMPERATURE: 98.2 F | HEART RATE: 67 BPM | BODY MASS INDEX: 31.1 KG/M2 | OXYGEN SATURATION: 97 % | WEIGHT: 210 LBS

## 2021-11-11 DIAGNOSIS — I25.10 CORONARY ARTERY DISEASE INVOLVING NATIVE CORONARY ARTERY OF NATIVE HEART WITHOUT ANGINA PECTORIS: ICD-10-CM

## 2021-11-11 DIAGNOSIS — R73.9 HYPERGLYCEMIA: ICD-10-CM

## 2021-11-11 DIAGNOSIS — C61 PROSTATE CANCER (HCC): Primary | ICD-10-CM

## 2021-11-11 DIAGNOSIS — Z79.899 HIGH RISK MEDICATION USE: ICD-10-CM

## 2021-11-11 DIAGNOSIS — I10 ESSENTIAL HYPERTENSION: ICD-10-CM

## 2021-11-11 PROCEDURE — 99214 OFFICE O/P EST MOD 30 MIN: CPT | Performed by: FAMILY MEDICINE

## 2021-11-11 RX ORDER — ATORVASTATIN CALCIUM 20 MG/1
20 TABLET, FILM COATED ORAL NIGHTLY
Qty: 90 TABLET | Refills: 3 | Status: SHIPPED | OUTPATIENT
Start: 2021-11-11 | End: 2022-10-31

## 2021-11-11 NOTE — PROGRESS NOTES
Chief Complaint   Patient presents with   • Hypertension       Subjective   Godfrey Shah is a 76 y.o. male.     History of Present Illness   F/U HTN.  No orthostasis.  Doing well with meds.    F/U CAD.  No CP.     F/u hyperlipidemia.  LDL 66 5/21.  No myalgias on meds.    The following portions of the patient's history were reviewed and updated as appropriate: allergies, current medications, past family history, past medical history, past social history, past surgical history and problem list.    Review of Systems   Constitutional: Negative for appetite change and fatigue.   HENT: Negative for nosebleeds and sore throat.    Eyes: Negative for blurred vision and visual disturbance.   Respiratory: Negative for shortness of breath and wheezing.    Cardiovascular: Negative for chest pain and leg swelling.   Gastrointestinal: Negative for abdominal distention and abdominal pain.   Endocrine: Negative for cold intolerance and polyuria.   Genitourinary: Negative for dysuria and hematuria.   Musculoskeletal: Negative for arthralgias and myalgias.   Skin: Negative for color change and rash.   Neurological: Negative for weakness and confusion.   Psychiatric/Behavioral: Negative for agitation and depressed mood.       Patient Active Problem List   Diagnosis   • Bradycardia, sinus   • Sleep-disordered breathing   • Symptomatic bradycardia   • AV block   • Prostate cancer (HCC)   • Pacemaker   • Insomnia   • RYAN (obstructive sleep apnea)   • Nephrolithiasis   • Hyperglycemia   • GERD (gastroesophageal reflux disease)   • Osteoarthritis   • COPD (chronic obstructive pulmonary disease) ([facility])   • Inability to attain erection   • Coronary artery disease   • Visit for suture removal   • Medicare annual wellness visit, subsequent   • Mixed hyperlipidemia   • COPD with acute exacerbation ([facility])   • Essential hypertension   • Paroxysmal atrial fibrillation (HCC)   • Other age-related cataract   • High risk medication use       No  Known Allergies      Current Outpatient Medications:   •  aspirin (ASPIR) 81 MG EC tablet, Take 81 mg by mouth Daily., Disp: , Rfl:   •  atorvastatin (LIPITOR) 20 MG tablet, Take 1 tablet by mouth Every Night., Disp: 90 tablet, Rfl: 3  •  cetirizine (ZyrTEC) 10 MG tablet, Take 10 mg by mouth daily., Disp: , Rfl:   •  Hydrocortisone, Perianal, (ANUSOL-HC) 2.5 % rectal cream, Insert  into the rectum 2 (Two) Times a Day. Prn hemorrhoidal issues., Disp: 28 g, Rfl: 11  •  losartan-hydrochlorothiazide (HYZAAR) 100-25 MG per tablet, Take 0.5 tablets by mouth Daily., Disp: 45 tablet, Rfl: 3  •  meclizine (ANTIVERT) 25 MG tablet, Take 1 tablet by mouth 3 (Three) Times a Day As Needed for dizziness., Disp: 15 tablet, Rfl: 0  •  metoprolol succinate XL (TOPROL-XL) 25 MG 24 hr tablet, Take 25 mg by mouth Daily., Disp: , Rfl:   •  nitroglycerin (NITROSTAT) 0.4 MG SL tablet, Place 1 tablet under the tongue Every 5 (Five) Minutes As Needed for Chest Pain (Chest Pain With Systolic Blood Pressure Greater Than 100)., Disp: 30 tablet, Rfl: 12  •  pantoprazole (PROTONIX) 40 MG EC tablet, Take 1 tablet by mouth Daily., Disp: 90 tablet, Rfl: 3  •  QUEtiapine (SEROquel) 100 MG tablet, TAKE 1 TABLET BY MOUTH EVERY NIGHT, Disp: 30 tablet, Rfl: 5  •  VENTOLIN  (90 BASE) MCG/ACT inhaler, INL 2 PFS PO Q 4 H PRN, Disp: , Rfl: 3  •  warfarin (Coumadin) 3 MG tablet, One a day, Disp: 90 tablet, Rfl: 3    Past Medical History:   Diagnosis Date   • Acute bronchitis    • Allergic rhinitis    • Anxiety    • Arm mass, right    • ASCVD (arteriosclerotic cardiovascular disease)    • AV block    • Biceps muscle tear    • BPPV (benign paroxysmal positional vertigo)    • Bradycardia    • CAD (coronary artery disease)    • Colon polyps    • COPD (chronic obstructive pulmonary disease) (HCC)    • COPD exacerbation (HCC)    • Coronary artery disease    • Depression with anxiety    • Eczema    • Elevated PSA    • GERD (gastroesophageal reflux disease)     • Heart block    • Hemorrhoids    • Hyperglycemia    • Hyperlipidemia    • Hypertension    • Inability to attain erection    • Insomnia    • Nephrolithiasis    • Obstructive chronic bronchitis (HCC)    • RYAN (obstructive sleep apnea)    • Osteoarthritis    • Other fatigue    • Pacemaker    • Prostate cancer (HCC) 05/2017   • Prostate cancer (HCC)    • Rotator cuff tendonitis, right    • Syncope        Past Surgical History:   Procedure Laterality Date   • CARDIAC CATHETERIZATION N/A 4/17/2019    Procedure: LEFT HEART CATH;  Surgeon: Lalo Naranjo MD;  Location:  [facility];  Service: Cardiovascular   • CARDIAC CATHETERIZATION N/A 4/17/2019    Procedure: CORONARY ANGIOGRAPHY;  Surgeon: Lalo Naranjo MD;  Location:  [facility];  Service: Cardiovascular   • CARDIAC CATHETERIZATION N/A 4/17/2019    Procedure: Left ventriculography;  Surgeon: Lalo Naranjo MD;  Location:  [facility];  Service: Cardiovascular   • CARDIAC ELECTROPHYSIOLOGY PROCEDURE Left 4/18/2019    Procedure: Pacemaker DC new;  Surgeon: Curtis Moore DO;  Location:  [facility];  Service: Cardiovascular   • COLONOSCOPY  04/2017   • CORONARY ANGIOPLASTY WITH STENT PLACEMENT     • PACEMAKER IMPLANTATION     • PENILE PROSTHESIS IMPLANT     • PENIS SURGERY      Penile implant   • PROSTATE BIOPSY  01/2015    histroy of needle biopsy of prostate - normal biopsy       Family History   Problem Relation Age of Onset   • No Known Problems Mother    • No Known Problems Father        Social History     Tobacco Use   • Smoking status: Former Smoker   • Smokeless tobacco: Never Used   • Tobacco comment: Quit 2000   Substance Use Topics   • Alcohol use: No            Objective     Vitals:    11/11/21 1309   BP: 126/62   Pulse: 67   Temp: 98.2 °F (36.8 °C)   SpO2: 97%     Body mass index is 31.47 kg/m².    Physical Exam  Vitals reviewed.   Constitutional:       Appearance: He is  well-developed. He is not diaphoretic.   HENT:      Head: Normocephalic and atraumatic.   Eyes:      General: No scleral icterus.     Pupils: Pupils are equal, round, and reactive to light.   Neck:      Thyroid: No thyromegaly.   Cardiovascular:      Rate and Rhythm: Normal rate and regular rhythm.      Heart sounds: No murmur heard.  No friction rub. No gallop.    Pulmonary:      Effort: Pulmonary effort is normal. No respiratory distress.      Breath sounds: No wheezing or rales.   Chest:      Chest wall: No tenderness.   Abdominal:      General: Bowel sounds are normal. There is no distension.      Palpations: Abdomen is soft.      Tenderness: There is no abdominal tenderness.   Musculoskeletal:         General: No deformity. Normal range of motion.   Lymphadenopathy:      Cervical: No cervical adenopathy.   Skin:     General: Skin is warm and dry.      Findings: No rash.   Neurological:      Cranial Nerves: No cranial nerve deficit.      Motor: No abnormal muscle tone.         Lab Results   Component Value Date    GLUCOSE 107 (H) 05/19/2021    BUN 14 05/19/2021    CREATININE 0.94 05/19/2021    EGFRIFNONA 78 05/19/2021    EGFRIFAFRI 95 05/19/2021    BCR 14.9 05/19/2021    K 4.1 05/19/2021    CO2 27.8 05/19/2021    CALCIUM 9.4 05/19/2021    PROTENTOTREF 6.5 05/19/2021    ALBUMIN 4.30 05/19/2021    LABIL2 2.0 05/19/2021    AST 23 05/19/2021    ALT 21 05/19/2021       WBC   Date Value Ref Range Status   07/21/2019 4.72 3.40 - 10.80 10*3/mm3 Final     RBC   Date Value Ref Range Status   07/21/2019 4.71 4.14 - 5.80 10*6/mm3 Final     Hemoglobin   Date Value Ref Range Status   07/21/2019 15.2 13.0 - 17.7 g/dL Final     Hematocrit   Date Value Ref Range Status   07/21/2019 45.8 37.5 - 51.0 % Final     MCV   Date Value Ref Range Status   07/21/2019 97.2 (H) 79.0 - 97.0 fL Final     MCH   Date Value Ref Range Status   07/21/2019 32.3 26.6 - 33.0 pg Final     MCHC   Date Value Ref Range Status   07/21/2019 33.2 31.5 - 35.7  g/dL Final     RDW   Date Value Ref Range Status   07/21/2019 12.4 12.3 - 15.4 % Final     RDW-SD   Date Value Ref Range Status   07/21/2019 44.6 37.0 - 54.0 fl Final     MPV   Date Value Ref Range Status   07/21/2019 10.0 6.0 - 12.0 fL Final     Platelets   Date Value Ref Range Status   07/21/2019 115 (L) 140 - 450 10*3/mm3 Final     Neutrophil %   Date Value Ref Range Status   07/21/2019 60.2 42.7 - 76.0 % Final     Lymphocyte %   Date Value Ref Range Status   07/21/2019 32.2 19.6 - 45.3 % Final     Monocyte %   Date Value Ref Range Status   07/21/2019 6.4 5.0 - 12.0 % Final     Eosinophil %   Date Value Ref Range Status   07/21/2019 0.8 0.3 - 6.2 % Final     Basophil %   Date Value Ref Range Status   07/21/2019 0.2 0.0 - 1.5 % Final     Immature Grans %   Date Value Ref Range Status   08/18/2016 0.0 0.0 - 0.5 % Final     Neutrophils, Absolute   Date Value Ref Range Status   07/21/2019 2.84 1.70 - 7.00 10*3/mm3 Final     Lymphocytes, Absolute   Date Value Ref Range Status   07/21/2019 1.52 0.70 - 3.10 10*3/mm3 Final     Monocytes, Absolute   Date Value Ref Range Status   07/21/2019 0.30 0.10 - 0.90 10*3/mm3 Final     Eosinophils, Absolute   Date Value Ref Range Status   07/21/2019 0.04 0.00 - 0.40 10*3/mm3 Final     Basophils, Absolute   Date Value Ref Range Status   07/21/2019 0.01 0.00 - 0.20 10*3/mm3 Final     Immature Grans, Absolute   Date Value Ref Range Status   08/18/2016 0.00 0.00 - 0.03 10*3/mm3 Final       Lab Results   Component Value Date    HGBA1C 6.10 (H) 05/19/2021       No results found for: [id number]    TSH   Date Value Ref Range Status   04/15/2019 1.030 0.270 - 4.200 mIU/mL Final       Lab Results   Component Value Date    CHOL 149 04/18/2019     Lab Results   Component Value Date    TRIG 103 05/19/2021     Lab Results   Component Value Date    HDL 38 (L) 05/19/2021     Lab Results   Component Value Date    LDL 66 05/19/2021     Lab Results   Component Value Date    VLDL 19 05/19/2021     Lab  Results   Component Value Date    LDLHDL 2.84 04/18/2019         Procedures    Assessment/Plan   Problems Addressed this Visit     Coronary artery disease    Relevant Medications    atorvastatin (LIPITOR) 20 MG tablet    Other Relevant Orders    Comprehensive Metabolic Panel    Essential hypertension    Relevant Orders    Comprehensive Metabolic Panel    High risk medication use    Relevant Orders    Vitamin B12    Hyperglycemia    Relevant Orders    Hemoglobin A1c    Comprehensive Metabolic Panel    Prostate cancer (HCC) - Primary    Relevant Orders    PSA DIAGNOSTIC      Diagnoses       Codes Comments    Prostate cancer (HCC)    -  Primary ICD-10-CM: C61  ICD-9-CM: 185     Coronary artery disease involving native coronary artery of native heart without angina pectoris     ICD-10-CM: I25.10  ICD-9-CM: 414.01     Hyperglycemia     ICD-10-CM: R73.9  ICD-9-CM: 790.29     High risk medication use     ICD-10-CM: Z79.899  ICD-9-CM: V58.69     Essential hypertension     ICD-10-CM: I10  ICD-9-CM: 401.9         HTN.  Controlled.  Continue meds.     Hyperlipidmeia.  LDL at goal 5/21.  Continue statin.  RF atorvastatin.    Hyperglycemia.  Check A1c.    Flu and covid utd.   Orders Placed This Encounter   Procedures   • Hemoglobin A1c     Order Specific Question:   Release to patient     Answer:   Immediate   • Comprehensive Metabolic Panel     Order Specific Question:   Release to patient     Answer:   Immediate   • PSA DIAGNOSTIC     Order Specific Question:   Release to patient     Answer:   Immediate   • Vitamin B12     Order Specific Question:   Release to patient     Answer:   Immediate       Current Outpatient Medications   Medication Sig Dispense Refill   • aspirin (ASPIR) 81 MG EC tablet Take 81 mg by mouth Daily.     • atorvastatin (LIPITOR) 20 MG tablet Take 1 tablet by mouth Every Night. 90 tablet 3   • cetirizine (ZyrTEC) 10 MG tablet Take 10 mg by mouth daily.     • Hydrocortisone, Perianal, (ANUSOL-HC) 2.5 %  rectal cream Insert  into the rectum 2 (Two) Times a Day. Prn hemorrhoidal issues. 28 g 11   • losartan-hydrochlorothiazide (HYZAAR) 100-25 MG per tablet Take 0.5 tablets by mouth Daily. 45 tablet 3   • meclizine (ANTIVERT) 25 MG tablet Take 1 tablet by mouth 3 (Three) Times a Day As Needed for dizziness. 15 tablet 0   • metoprolol succinate XL (TOPROL-XL) 25 MG 24 hr tablet Take 25 mg by mouth Daily.     • nitroglycerin (NITROSTAT) 0.4 MG SL tablet Place 1 tablet under the tongue Every 5 (Five) Minutes As Needed for Chest Pain (Chest Pain With Systolic Blood Pressure Greater Than 100). 30 tablet 12   • pantoprazole (PROTONIX) 40 MG EC tablet Take 1 tablet by mouth Daily. 90 tablet 3   • QUEtiapine (SEROquel) 100 MG tablet TAKE 1 TABLET BY MOUTH EVERY NIGHT 30 tablet 5   • VENTOLIN  (90 BASE) MCG/ACT inhaler INL 2 PFS PO Q 4 H PRN  3   • warfarin (Coumadin) 3 MG tablet One a day 90 tablet 3     No current facility-administered medications for this visit.       Godfrey Shah had no medications administered during this visit.    No follow-ups on file.    There are no Patient Instructions on file for this visit.

## 2021-11-12 LAB
ALBUMIN SERPL-MCNC: 4.3 G/DL (ref 3.7–4.7)
ALBUMIN/GLOB SERPL: 1.7 {RATIO} (ref 1.2–2.2)
ALP SERPL-CCNC: 94 IU/L (ref 44–121)
ALT SERPL-CCNC: 19 IU/L (ref 0–44)
AST SERPL-CCNC: 21 IU/L (ref 0–40)
BILIRUB SERPL-MCNC: 1.6 MG/DL (ref 0–1.2)
BUN SERPL-MCNC: 16 MG/DL (ref 8–27)
BUN/CREAT SERPL: 14 (ref 10–24)
CALCIUM SERPL-MCNC: 9.3 MG/DL (ref 8.6–10.2)
CHLORIDE SERPL-SCNC: 103 MMOL/L (ref 96–106)
CO2 SERPL-SCNC: 23 MMOL/L (ref 20–29)
CREAT SERPL-MCNC: 1.15 MG/DL (ref 0.76–1.27)
GLOBULIN SER CALC-MCNC: 2.5 G/DL (ref 1.5–4.5)
GLUCOSE SERPL-MCNC: 101 MG/DL (ref 65–99)
HBA1C MFR BLD: 6.5 % (ref 4.8–5.6)
POTASSIUM SERPL-SCNC: 4.3 MMOL/L (ref 3.5–5.2)
PROT SERPL-MCNC: 6.8 G/DL (ref 6–8.5)
PSA SERPL-MCNC: 1.2 NG/ML (ref 0–4)
SODIUM SERPL-SCNC: 142 MMOL/L (ref 134–144)
VIT B12 SERPL-MCNC: 239 PG/ML (ref 232–1245)

## 2022-01-31 ENCOUNTER — TRANSCRIBE ORDERS (OUTPATIENT)
Dept: ADMINISTRATIVE | Facility: HOSPITAL | Age: 77
End: 2022-01-31

## 2022-01-31 DIAGNOSIS — C61 MALIGNANT NEOPLASM OF PROSTATE: Primary | ICD-10-CM

## 2022-02-14 ENCOUNTER — TELEPHONE (OUTPATIENT)
Dept: FAMILY MEDICINE CLINIC | Facility: CLINIC | Age: 77
End: 2022-02-14

## 2022-02-14 RX ORDER — LOSARTAN POTASSIUM 100 MG/1
100 TABLET ORAL DAILY
Qty: 90 TABLET | Refills: 1 | Status: SHIPPED | OUTPATIENT
Start: 2022-02-14 | End: 2022-02-16 | Stop reason: SDUPTHER

## 2022-02-14 RX ORDER — PANTOPRAZOLE SODIUM 40 MG/1
40 TABLET, DELAYED RELEASE ORAL DAILY
Qty: 90 TABLET | Refills: 3 | Status: SHIPPED | OUTPATIENT
Start: 2022-02-14 | End: 2022-05-27

## 2022-02-14 NOTE — TELEPHONE ENCOUNTER
Caller: Godfrey Shah    Relationship: Self    Best call back number: [phone]     Requested Prescriptions:   Requested Prescriptions     Pending Prescriptions Disp Refills   • pantoprazole (PROTONIX) 40 MG EC tablet 90 tablet 3     Sig: Take 1 tablet by mouth Daily.        Pharmacy where request should be sent: [facility][id number] - [facility] [address] [place] [address] - [phone]  - [phone] FX     Additional details provided by patient: 90 DAY SUPPLY    Does the patient have less than a 3 day supply:  [] Yes  [x] No    Chela Scott Rep   02/14/22 16:01 EST

## 2022-02-14 NOTE — TELEPHONE ENCOUNTER
Tell him to stop losartan HCT.  We will just change to losartan 100 once a day.  I sent over to [facility].

## 2022-02-14 NOTE — TELEPHONE ENCOUNTER
Caller: Godfrey Shah    Relationship: Self    Best call back number: [phone]    What medication are you requesting:   losartan     OR        hydrochlorothiazide   90 DAY SUPPLY         If a prescription is needed, what is your preferred pharmacy and phone number: [facility] #[id number] - [facility] [address] AT [facility] - [phone] [facility] [phone] FX     Additional notes:INSURANCE HAS CHANGED FORMULARY. PATIENT'S CURRENT MEDICATION,   losartan-hydrochlorothiazide (HYZAAR) 100-25 MG per tablet  IS IN A HIGHER TIER  INSURANCE ADVISED PATIENT TO ASK FOR   Losartan    OR hydrochlorothiazide   SO MEDICATION WILL BE IN A LOWER TIER/LOWER COST

## 2022-02-15 NOTE — TELEPHONE ENCOUNTER
PATIENT IS CALLING TO CHECK THE STATUS OF HIS REQUEST FOR THE FOLLOWING MEDICATION.  HE STATES HE CHECKED WITH THE PHARMACY AND WAS TOLD THEY DO NOT HAVE ANYTHING FOR HIM.     losartan (Cozaar) 100 MG tablet     [id number] - [facility] [address] AT [facility] - [phone] [facility] [phone]   [phone]    PLEASE ADVISE.

## 2022-02-16 ENCOUNTER — TELEPHONE (OUTPATIENT)
Dept: FAMILY MEDICINE CLINIC | Facility: CLINIC | Age: 77
End: 2022-02-16

## 2022-02-16 RX ORDER — LOSARTAN POTASSIUM 100 MG/1
100 TABLET ORAL DAILY
Qty: 90 TABLET | Refills: 1 | Status: SHIPPED | OUTPATIENT
Start: 2022-02-16 | End: 2022-02-16 | Stop reason: RX

## 2022-02-16 RX ORDER — VALSARTAN 160 MG/1
160 TABLET ORAL DAILY
Qty: 90 TABLET | Refills: 1 | Status: SHIPPED | OUTPATIENT
Start: 2022-02-16 | End: 2022-02-17

## 2022-02-16 NOTE — TELEPHONE ENCOUNTER
Tell him I had sent it, but I sent it again to the [facility].

## 2022-02-16 NOTE — TELEPHONE ENCOUNTER
PHARMACY SAYS THEY DO NOT HAVE LOSARTIN IT IS ON BACKORDER SO NEEDS SOMETHING ELSE TO REPLACE IT    [facility] #[id number] - [facility] [address] AT [facility] - [phone]  - [phone]   [phone]

## 2022-02-17 ENCOUNTER — APPOINTMENT (OUTPATIENT)
Dept: OTHER | Facility: HOSPITAL | Age: 77
End: 2022-02-17

## 2022-02-17 ENCOUNTER — HOSPITAL ENCOUNTER (OUTPATIENT)
Dept: PET IMAGING | Facility: HOSPITAL | Age: 77
End: 2022-02-17

## 2022-02-17 ENCOUNTER — HOSPITAL ENCOUNTER (OUTPATIENT)
Dept: PET IMAGING | Facility: HOSPITAL | Age: 77
Discharge: HOME | End: 2022-02-17

## 2022-02-17 ENCOUNTER — TELEPHONE (OUTPATIENT)
Dept: FAMILY MEDICINE CLINIC | Facility: CLINIC | Age: 77
End: 2022-02-17

## 2022-02-17 ENCOUNTER — APPOINTMENT (OUTPATIENT)
Dept: PET IMAGING | Facility: HOSPITAL | Age: 77
End: 2022-02-17

## 2022-02-17 DIAGNOSIS — Z09 FOLLOW UP: ICD-10-CM

## 2022-02-17 DIAGNOSIS — C61 MALIGNANT NEOPLASM OF PROSTATE: ICD-10-CM

## 2022-02-17 PROCEDURE — A9588 FLUCICLOVINE F-18: HCPCS | Performed by: UROLOGY

## 2022-02-17 PROCEDURE — 0 FLUCICLOVINE: Performed by: UROLOGY

## 2022-02-17 PROCEDURE — 78815 PET IMAGE W/CT SKULL-THIGH: CPT

## 2022-02-17 RX ORDER — LOSARTAN POTASSIUM 100 MG/1
100 TABLET ORAL DAILY
Qty: 90 TABLET | Refills: 1 | Status: SHIPPED | OUTPATIENT
Start: 2022-02-17 | End: 2022-11-16

## 2022-02-17 RX ADMIN — FLUCICLOVINE F-18 1 DOSE: 221 INJECTION, SOLUTION INTRAVENOUS at 11:51

## 2022-02-17 NOTE — TELEPHONE ENCOUNTER
Caller: Godfrey Shah    Relationship: Self    Best call back number: [phone]    Requested Prescriptions:     Pharmacy where request should be sent: [facility] #[id number] - [facility] [address] - [phone] [facility] [phone] FX     Additional details provided by patient: SEE PREVIOUS SIGNED ENCOUNTER. PATIENT STATED THAT HE IS NOT GOING TO  THE VALSARTAN BECAUSE IT IS MORE EXPENSIVE THAN LOSARTAN. PATIENT NOW WANTS A REFILL OF LOSARTAN 100 MG SENT TO [facility] INSTEAD OF [facility]. PATIENT ALSO REQUESTED THAT QTY BE CHANGED FROM 45 TO 90.     Does the patient have less than a 3 day supply:  [x] Yes  [] No    Chela Suero Rep   02/17/22 11:04 EST

## 2022-02-17 NOTE — TELEPHONE ENCOUNTER
Losartan 100mg what patient was previously taking reordered and sent to [facility] pharmacy for patient due to cost of Valsartan.

## 2022-03-22 ENCOUNTER — TELEPHONE (OUTPATIENT)
Dept: FAMILY MEDICINE CLINIC | Facility: CLINIC | Age: 77
End: 2022-03-22

## 2022-03-22 RX ORDER — QUETIAPINE FUMARATE 100 MG/1
100 TABLET, FILM COATED ORAL NIGHTLY
Qty: 30 TABLET | Refills: 5 | Status: SHIPPED | OUTPATIENT
Start: 2022-03-22 | End: 2023-03-20

## 2022-03-22 NOTE — TELEPHONE ENCOUNTER
Rx Refill Note  Requested Prescriptions     Pending Prescriptions Disp Refills   • QUEtiapine (SEROquel) 100 MG tablet 30 tablet 5     Sig: Take 1 tablet by mouth Every Night.      Last office visit with prescribing clinician: 11/11/2021      Next office visit with prescribing clinician: 5/11/2022   Last filled 8/24/2021           Krupa Dia MA  03/22/22, 10:03 EDT

## 2022-03-22 NOTE — TELEPHONE ENCOUNTER
Caller: Godfrey Shah    Relationship: Self    Best call back number: [phone]    What medication are you requesting: QUEtiapine (SEROquel) 100 MG tablet  90 DAY SUPPLY    If a prescription is needed, what is your preferred pharmacy and phone number: [id number] [place], [place] - [address] - [phone]  - [phone] FX     Additional notes:

## 2022-04-21 ENCOUNTER — OFFICE VISIT (OUTPATIENT)
Dept: FAMILY MEDICINE CLINIC | Facility: CLINIC | Age: 77
End: 2022-04-21

## 2022-04-21 VITALS — WEIGHT: 218.2 LBS | BODY MASS INDEX: 32.69 KG/M2 | OXYGEN SATURATION: 94 % | TEMPERATURE: 97.7 F

## 2022-04-21 DIAGNOSIS — R42 DIZZINESS: Primary | ICD-10-CM

## 2022-04-21 DIAGNOSIS — R82.90 ABNORMAL URINALYSIS: ICD-10-CM

## 2022-04-21 DIAGNOSIS — I10 ESSENTIAL (PRIMARY) HYPERTENSION: ICD-10-CM

## 2022-04-21 LAB
BILIRUB BLD-MCNC: NEGATIVE MG/DL
CLARITY, POC: CLEAR
COLOR UR: YELLOW
EXPIRATION DATE: ABNORMAL
GLUCOSE UR STRIP-MCNC: NEGATIVE MG/DL
KETONES UR QL: NEGATIVE
LEUKOCYTE EST, POC: NEGATIVE
Lab: ABNORMAL
NITRITE UR-MCNC: NEGATIVE MG/ML
PH UR: 6 [PH] (ref 5–8)
PROT UR STRIP-MCNC: NEGATIVE MG/DL
RBC # UR STRIP: ABNORMAL /UL
SP GR UR: 1.01 (ref 1–1.03)
UROBILINOGEN UR QL: NORMAL

## 2022-04-21 PROCEDURE — 99213 OFFICE O/P EST LOW 20 MIN: CPT | Performed by: NURSE PRACTITIONER

## 2022-04-21 PROCEDURE — 81003 URINALYSIS AUTO W/O SCOPE: CPT | Performed by: NURSE PRACTITIONER

## 2022-04-21 RX ORDER — MECLIZINE HYDROCHLORIDE 25 MG/1
25 TABLET ORAL 3 TIMES DAILY PRN
Qty: 30 TABLET | Refills: 0 | Status: SHIPPED | OUTPATIENT
Start: 2022-04-21 | End: 2022-04-25

## 2022-04-21 NOTE — PROGRESS NOTES
Chief Complaint  Dizziness    Subjective          Godfrey Shah presents to [facility]  Dizziness  This is a recurrent problem. The current episode started yesterday. Associated symptoms include nausea. Pertinent negatives include no abdominal pain, change in bowel habit, chest pain, fever, headaches, urinary symptoms or visual change. Exacerbated by: position change. Treatments tried: Dramamine.  The treatment provided mild relief.     Objective   Vital Signs:   Temp 97.7 °F (36.5 °C) (Temporal)   Wt 99 kg (218 lb 3.2 oz)   SpO2 94%   BMI 32.69 kg/m²   Vitals:    04/21/22 1052 04/21/22 1057 04/21/22 1100 04/21/22 1101   Orthostatic BP: 126/79 136/85 133/78 125/78   Orthostatic Pulse: 68 60 60 59   Patient Position: Sitting Lying Sitting Standing         Physical Exam  Vitals and nursing note reviewed.   Constitutional:       Appearance: Normal appearance.   HENT:      Head: Normocephalic and atraumatic.      Right Ear: Tympanic membrane and ear canal normal.      Left Ear: Tympanic membrane and ear canal normal.      Nose: Nose normal.      Mouth/Throat:      Mouth: Mucous membranes are moist.      Pharynx: Oropharynx is clear.   Eyes:      Conjunctiva/sclera: Conjunctivae normal.      Pupils: Pupils are equal, round, and reactive to light.   Cardiovascular:      Rate and Rhythm: Normal rate and regular rhythm.   Pulmonary:      Effort: Pulmonary effort is normal.      Breath sounds: Normal breath sounds.   Abdominal:      General: Bowel sounds are normal.      Palpations: Abdomen is soft.      Tenderness: There is no abdominal tenderness.   Musculoskeletal:      Cervical back: Neck supple.   Skin:     General: Skin is warm and dry.   Neurological:      Mental Status: He is alert and oriented to person, place, and time.   Psychiatric:         Mood and Affect: Mood normal.        Result Review :   The following data was reviewed by: JUAN Chung on 04/21/2022:  EV     Urinalysis 4/21/22   Ketones, UA Negative   Leukocytes, UA Negative                  Assessment and Plan    Diagnoses and all orders for this visit:    1. Dizziness (Primary)  -     CBC & Differential  -     Comprehensive Metabolic Panel  -     TSH Rfx On Abnormal To Free T4  -     POC Urinalysis Dipstick, Automated  -     meclizine (ANTIVERT) 25 MG tablet; Take 1 tablet by mouth 3 (Three) Times a Day As Needed for Dizziness.  Dispense: 30 tablet; Refill: 0    2. Essential (primary) hypertension   -     TSH Rfx On Abnormal To Free T4    3. Abnormal urinalysis  -     Urine Culture - Urine, Urine, Clean Catch      I spent 20 minutes caring for Godfrey on this date of service. This time includes time spent by me in the following activities:reviewing tests, performing a medically appropriate examination and/or evaluation , counseling and educating the patient/family/caregiver, ordering medications, tests, or procedures and documenting information in the medical record  Follow Up   Return if symptoms worsen or fail to improve.  Patient was given instructions and counseling regarding his condition or for health maintenance advice. Please see specific information pulled into the AVS if appropriate.

## 2022-04-22 ENCOUNTER — TELEPHONE (OUTPATIENT)
Dept: FAMILY MEDICINE CLINIC | Facility: CLINIC | Age: 77
End: 2022-04-22

## 2022-04-22 LAB
ALBUMIN SERPL-MCNC: 3.9 G/DL (ref 3.7–4.7)
ALBUMIN/GLOB SERPL: 1.4 {RATIO} (ref 1.2–2.2)
ALP SERPL-CCNC: 80 IU/L (ref 44–121)
ALT SERPL-CCNC: 13 IU/L (ref 0–44)
AST SERPL-CCNC: 21 IU/L (ref 0–40)
BASOPHILS # BLD AUTO: 0 X10E3/UL (ref 0–0.2)
BASOPHILS NFR BLD AUTO: 1 %
BILIRUB SERPL-MCNC: 1 MG/DL (ref 0–1.2)
BUN SERPL-MCNC: 15 MG/DL (ref 8–27)
BUN/CREAT SERPL: 13 (ref 10–24)
CALCIUM SERPL-MCNC: 9 MG/DL (ref 8.6–10.2)
CHLORIDE SERPL-SCNC: 102 MMOL/L (ref 96–106)
CO2 SERPL-SCNC: 24 MMOL/L (ref 20–29)
CREAT SERPL-MCNC: 1.13 MG/DL (ref 0.76–1.27)
EGFRCR SERPLBLD CKD-EPI 2021: 67 ML/MIN/1.73
EOSINOPHIL # BLD AUTO: 0.2 X10E3/UL (ref 0–0.4)
EOSINOPHIL NFR BLD AUTO: 3 %
ERYTHROCYTE [DISTWIDTH] IN BLOOD BY AUTOMATED COUNT: 13.1 % (ref 11.6–15.4)
GLOBULIN SER CALC-MCNC: 2.8 G/DL (ref 1.5–4.5)
GLUCOSE SERPL-MCNC: 110 MG/DL (ref 65–99)
HCT VFR BLD AUTO: 48.4 % (ref 37.5–51)
HGB BLD-MCNC: 16.8 G/DL (ref 13–17.7)
IMM GRANULOCYTES # BLD AUTO: 0 X10E3/UL (ref 0–0.1)
IMM GRANULOCYTES NFR BLD AUTO: 0 %
LYMPHOCYTES # BLD AUTO: 2 X10E3/UL (ref 0.7–3.1)
LYMPHOCYTES NFR BLD AUTO: 39 %
MCH RBC QN AUTO: 32.8 PG (ref 26.6–33)
MCHC RBC AUTO-ENTMCNC: 34.7 G/DL (ref 31.5–35.7)
MCV RBC AUTO: 95 FL (ref 79–97)
MONOCYTES # BLD AUTO: 0.5 X10E3/UL (ref 0.1–0.9)
MONOCYTES NFR BLD AUTO: 9 %
NEUTROPHILS # BLD AUTO: 2.5 X10E3/UL (ref 1.4–7)
NEUTROPHILS NFR BLD AUTO: 48 %
PLATELET # BLD AUTO: 140 X10E3/UL (ref 150–450)
POTASSIUM SERPL-SCNC: 4 MMOL/L (ref 3.5–5.2)
PROT SERPL-MCNC: 6.7 G/DL (ref 6–8.5)
RBC # BLD AUTO: 5.12 X10E6/UL (ref 4.14–5.8)
SODIUM SERPL-SCNC: 141 MMOL/L (ref 134–144)
TSH SERPL DL<=0.005 MIU/L-ACNC: 1.36 UIU/ML (ref 0.45–4.5)
WBC # BLD AUTO: 5.3 X10E3/UL (ref 3.4–10.8)

## 2022-04-22 NOTE — TELEPHONE ENCOUNTER
LMTCB    **HUB/ MAY RELAY MESSAGE          ----- Message from JUAN Chung sent at 4/22/2022  7:53 AM EDT -----  Please inform patient that CBC remains within acceptable limits.  Kidney function tests are normal.  Electrolytes are normal.  Liver function tests are normal.  Thyroid function test is normal.  We will let patient know when urine culture results are back.

## 2022-04-23 LAB
BACTERIA UR CULT: NO GROWTH
BACTERIA UR CULT: NORMAL

## 2022-04-25 DIAGNOSIS — R42 DIZZINESS: ICD-10-CM

## 2022-04-25 RX ORDER — MECLIZINE HYDROCHLORIDE 25 MG/1
TABLET ORAL
Qty: 30 TABLET | Refills: 0 | Status: SHIPPED | OUTPATIENT
Start: 2022-04-25

## 2022-04-26 ENCOUNTER — TELEPHONE (OUTPATIENT)
Dept: FAMILY MEDICINE CLINIC | Facility: CLINIC | Age: 77
End: 2022-04-26

## 2022-04-26 NOTE — TELEPHONE ENCOUNTER
TCB    **HUB/** MAY RELAY MESSAGE TO PATIENT.        ----- Message from JUAN Chung sent at 4/26/2022 11:40 AM EDT -----  Please inform patient that urine culture is negative for infection.  Would recommend patient repeat urinalysis during office visit on 5/11/2022 due to blood on recent urinalysis.

## 2022-04-26 NOTE — TELEPHONE ENCOUNTER
Caller: Silvia Shah    Relationship to patient: Emergency Contact    Best call back number: [phone]    Patient is needing: HUB READ:  **HUB/** MAY RELAY MESSAGE TO PATIENT.           ----- Message from JUAN Chung sent at 4/26/2022 11:40 AM EDT -----  Please inform patient that urine culture is negative for infection.  Would recommend patient repeat urinalysis during office visit on 5/11/2022 due to blood on recent urinalysis.

## 2022-05-11 ENCOUNTER — OFFICE VISIT (OUTPATIENT)
Dept: FAMILY MEDICINE CLINIC | Facility: CLINIC | Age: 77
End: 2022-05-11

## 2022-05-11 VITALS
BODY MASS INDEX: 32.29 KG/M2 | HEIGHT: 69 IN | SYSTOLIC BLOOD PRESSURE: 124 MMHG | OXYGEN SATURATION: 96 % | TEMPERATURE: 97.7 F | DIASTOLIC BLOOD PRESSURE: 70 MMHG | WEIGHT: 218 LBS | HEART RATE: 60 BPM

## 2022-05-11 DIAGNOSIS — R31.21 ASYMPTOMATIC MICROSCOPIC HEMATURIA: ICD-10-CM

## 2022-05-11 DIAGNOSIS — Z11.59 ENCOUNTER FOR HEPATITIS C SCREENING TEST FOR LOW RISK PATIENT: ICD-10-CM

## 2022-05-11 DIAGNOSIS — R73.9 HYPERGLYCEMIA: ICD-10-CM

## 2022-05-11 DIAGNOSIS — E78.2 MIXED HYPERLIPIDEMIA: ICD-10-CM

## 2022-05-11 DIAGNOSIS — I10 ESSENTIAL HYPERTENSION: Primary | ICD-10-CM

## 2022-05-11 PROCEDURE — 99214 OFFICE O/P EST MOD 30 MIN: CPT | Performed by: FAMILY MEDICINE

## 2022-05-11 NOTE — PROGRESS NOTES
Chief Complaint   Patient presents with   • Hypertension       Subjective   Godfrey Shah is a 76 y.o. male.     History of Present Illness   F/U HTN.  No  Orthostasis.  Doing well with meds.    F/U hyperlipidmeia.  Doing well with meds.  LDL 66 from 5/21.    F/U hyperglycemia.  Doing wel lwith TLC.  C/o vertigo on occasion.  Gets trouble with sitting up in AM.      The following portions of the patient's history were reviewed and updated as appropriate: allergies, current medications, past family history, past medical history, past social history, past surgical history and problem list.    Review of Systems   Constitutional: Negative for appetite change and fatigue.   HENT: Negative for nosebleeds and sore throat.    Eyes: Negative for blurred vision and visual disturbance.   Respiratory: Negative for shortness of breath and wheezing.    Cardiovascular: Negative for chest pain and leg swelling.   Gastrointestinal: Negative for abdominal distention and abdominal pain.   Endocrine: Negative for cold intolerance and polyuria.   Genitourinary: Negative for dysuria and hematuria.   Musculoskeletal: Negative for arthralgias and myalgias.   Skin: Negative for color change and rash.   Neurological: Negative for weakness and confusion.   Psychiatric/Behavioral: Negative for agitation and depressed mood.       Patient Active Problem List   Diagnosis   • Bradycardia, sinus   • Sleep-disordered breathing   • Symptomatic bradycardia   • AV block   • Prostate cancer ([facility])   • Pacemaker   • Insomnia   • RYAN (obstructive sleep apnea)   • Nephrolithiasis   • Hyperglycemia   • GERD (gastroesophageal reflux disease)   • Osteoarthritis   • COPD (chronic obstructive pulmonary disease) ([facility])   • Inability to attain erection   • Coronary artery disease   • Visit for suture removal   • Medicare annual wellness visit, subsequent   • Mixed hyperlipidemia   • COPD with acute exacerbation ([facility])   • Essential hypertension   • Paroxysmal  atrial fibrillation (HCC)   • Other age-related cataract   • High risk medication use   • Asymptomatic microscopic hematuria       No Known Allergies      Current Outpatient Medications:   •  aspirin (aspirin) 81 MG EC tablet, Take 81 mg by mouth Daily., Disp: , Rfl:   •  atorvastatin (LIPITOR) 20 MG tablet, Take 1 tablet by mouth Every Night., Disp: 90 tablet, Rfl: 3  •  cetirizine (ZyrTEC) 10 MG tablet, Take 10 mg by mouth daily., Disp: , Rfl:   •  Hydrocortisone, Perianal, (ANUSOL-HC) 2.5 % rectal cream, Insert  into the rectum 2 (Two) Times a Day. Prn hemorrhoidal issues., Disp: 28 g, Rfl: 11  •  losartan (COZAAR) 100 MG tablet, Take 1 tablet by mouth Daily., Disp: 90 tablet, Rfl: 1  •  meclizine (ANTIVERT) 25 MG tablet, TAKE 1 TABLET BY MOUTH THREE TIMES A DAY AS NEEDED FOR DIZZINESS., Disp: 30 tablet, Rfl: 0  •  metoprolol succinate XL (TOPROL-XL) 25 MG 24 hr tablet, Take 25 mg by mouth Daily., Disp: , Rfl:   •  nitroglycerin (NITROSTAT) 0.4 MG SL tablet, Place 1 tablet under the tongue Every 5 (Five) Minutes As Needed for Chest Pain (Chest Pain With Systolic Blood Pressure Greater Than 100)., Disp: 30 tablet, Rfl: 12  •  pantoprazole (PROTONIX) 40 MG EC tablet, Take 1 tablet by mouth Daily., Disp: 90 tablet, Rfl: 3  •  QUEtiapine (SEROquel) 100 MG tablet, Take 1 tablet by mouth Every Night., Disp: 30 tablet, Rfl: 5  •  VENTOLIN  (90 BASE) MCG/ACT inhaler, INL 2 PFS PO Q 4 H PRN, Disp: , Rfl: 3  •  warfarin (Coumadin) 3 MG tablet, One a day, Disp: 90 tablet, Rfl: 3    Past Medical History:   Diagnosis Date   • Acute bronchitis    • Allergic rhinitis    • Anxiety    • Arm mass, right    • ASCVD (arteriosclerotic cardiovascular disease)    • AV block    • Biceps muscle tear    • BPPV (benign paroxysmal positional vertigo)    • Bradycardia    • CAD (coronary artery disease)    • Colon polyps    • COPD (chronic obstructive pulmonary disease) (HCC)    • COPD exacerbation (HCC)    • Coronary artery disease     • Depression with anxiety    • Eczema    • Elevated PSA    • GERD (gastroesophageal reflux disease)    • Heart block    • Hemorrhoids    • Hyperglycemia    • Hyperlipidemia    • Hypertension    • Inability to attain erection    • Insomnia    • Nephrolithiasis    • Obstructive chronic bronchitis (HCC)    • RYAN (obstructive sleep apnea)    • Osteoarthritis    • Other fatigue    • Pacemaker    • Prostate cancer (HCC) 05/2017   • Prostate cancer (HCC)    • Rotator cuff tendonitis, right    • Syncope        Past Surgical History:   Procedure Laterality Date   • CARDIAC CATHETERIZATION N/A 4/17/2019    Procedure: LEFT HEART CATH;  Surgeon: Lalo Naranjo MD;  Location:  [facility] CATH INVASIVE [facility];  Service: Cardiovascular   • CARDIAC CATHETERIZATION N/A 4/17/2019    Procedure: CORONARY ANGIOGRAPHY;  Surgeon: Lalo Naranjo MD;  Location:  [facility] INVASIVE [facility];  Service: Cardiovascular   • CARDIAC CATHETERIZATION N/A 4/17/2019    Procedure: Left ventriculography;  Surgeon: Lalo Naranjo MD;  Location:  [facility] CATH INVASIVE LOCATION;  Service: Cardiovascular   • CARDIAC ELECTROPHYSIOLOGY PROCEDURE Left 4/18/2019    Procedure: Pacemaker DC new;  Surgeon: Curtis Moore DO;  Location: [facility] CATH INVASIVE LOC[facility];  Service: Cardiovascular   • COLONOSCOPY  04/2017   • CORONARY ANGIOPLASTY WITH STENT PLACEMENT     • PACEMAKER IMPLANTATION     • PENILE PROSTHESIS IMPLANT     • PENIS SURGERY      Penile implant   • PROSTATE BIOPSY  01/2015    histroy of needle biopsy of prostate - normal biopsy       Family History   Problem Relation Age of Onset   • No Known Problems Mother    • No Known Problems Father        Social History     Tobacco Use   • Smoking status: Former Smoker   • Smokeless tobacco: Never Used   • Tobacco comment: Quit 2000   Substance Use Topics   • Alcohol use: No            Objective     Vitals:    05/11/22 0826   BP: 124/70   Pulse: 60   Temp: 97.7 °F (36.5 °C)   SpO2: 96%     Body  mass index is 32.66 kg/m².    Physical Exam  Vitals reviewed.   Constitutional:       Appearance: He is well-developed. He is not diaphoretic.   HENT:      Head: Normocephalic and atraumatic.   Eyes:      General: No scleral icterus.     Pupils: Pupils are equal, round, and reactive to light.   Neck:      Thyroid: No thyromegaly.   Cardiovascular:      Rate and Rhythm: Normal rate and regular rhythm.      Heart sounds: No murmur heard.    No friction rub. No gallop.   Pulmonary:      Effort: Pulmonary effort is normal. No respiratory distress.      Breath sounds: No wheezing or rales.   Chest:      Chest wall: No tenderness.   Abdominal:      General: Bowel sounds are normal. There is no distension.      Palpations: Abdomen is soft.      Tenderness: There is no abdominal tenderness.   Musculoskeletal:         General: No deformity. Normal range of motion.   Lymphadenopathy:      Cervical: No cervical adenopathy.   Skin:     General: Skin is warm and dry.      Findings: No rash.   Neurological:      Cranial Nerves: No cranial nerve deficit.      Motor: No abnormal muscle tone.         Lab Results   Component Value Date    GLUCOSE 110 (H) 04/21/2022    BUN 15 04/21/2022    CREATININE 1.13 04/21/2022    EGFRIFNONA 61 11/11/2021    EGFRIFAFRI 71 11/11/2021    BCR 13 04/21/2022    K 4.0 04/21/2022    CO2 24 04/21/2022    CALCIUM 9.0 04/21/2022    PROTENTOTREF 6.7 04/21/2022    ALBUMIN 3.9 04/21/2022    LABIL2 1.4 04/21/2022    AST 21 04/21/2022    ALT 13 04/21/2022       WBC   Date Value Ref Range Status   04/21/2022 5.3 3.4 - 10.8 x10E3/uL Final     RBC   Date Value Ref Range Status   04/21/2022 5.12 4.14 - 5.80 x10E6/uL Final     Hemoglobin   Date Value Ref Range Status   04/21/2022 16.8 13.0 - 17.7 g/dL Final   07/21/2019 15.2 13.0 - 17.7 g/dL Final     Hematocrit   Date Value Ref Range Status   04/21/2022 48.4 37.5 - 51.0 % Final   07/21/2019 45.8 37.5 - 51.0 % Final     MCV   Date Value Ref Range Status   04/21/2022  95 79 - 97 fL Final   07/21/2019 97.2 (H) 79.0 - 97.0 fL Final     MCH   Date Value Ref Range Status   04/21/2022 32.8 26.6 - 33.0 pg Final   07/21/2019 32.3 26.6 - 33.0 pg Final     MCHC   Date Value Ref Range Status   04/21/2022 34.7 31.5 - 35.7 g/dL Final   07/21/2019 33.2 31.5 - 35.7 g/dL Final     RDW   Date Value Ref Range Status   04/21/2022 13.1 11.6 - 15.4 % Final   07/21/2019 12.4 12.3 - 15.4 % Final     RDW-SD   Date Value Ref Range Status   07/21/2019 44.6 37.0 - 54.0 fl Final     MPV   Date Value Ref Range Status   07/21/2019 10.0 6.0 - 12.0 fL Final     Platelets   Date Value Ref Range Status   04/21/2022 140 (L) 150 - 450 x10E3/uL Final   07/21/2019 115 (L) 140 - 450 10*3/mm3 Final     Neutrophil Rel %   Date Value Ref Range Status   04/21/2022 48 Not Estab. % Final     Neutrophil %   Date Value Ref Range Status   07/21/2019 60.2 42.7 - 76.0 % Final     Lymphocyte Rel %   Date Value Ref Range Status   04/21/2022 39 Not Estab. % Final     Lymphocyte %   Date Value Ref Range Status   07/21/2019 32.2 19.6 - 45.3 % Final     Monocyte Rel %   Date Value Ref Range Status   04/21/2022 9 Not Estab. % Final     Monocyte %   Date Value Ref Range Status   07/21/2019 6.4 5.0 - 12.0 % Final     Eosinophil Rel %   Date Value Ref Range Status   04/21/2022 3 Not Estab. % Final     Eosinophil %   Date Value Ref Range Status   07/21/2019 0.8 0.3 - 6.2 % Final     Basophil Rel %   Date Value Ref Range Status   04/21/2022 1 Not Estab. % Final     Basophil %   Date Value Ref Range Status   07/21/2019 0.2 0.0 - 1.5 % Final     Immature Grans %   Date Value Ref Range Status   08/18/2016 0.0 0.0 - 0.5 % Final     Neutrophils Absolute   Date Value Ref Range Status   04/21/2022 2.5 1.4 - 7.0 x10E3/uL Final     Neutrophils, Absolute   Date Value Ref Range Status   07/21/2019 2.84 1.70 - 7.00 10*3/mm3 Final     Lymphocytes Absolute   Date Value Ref Range Status   04/21/2022 2.0 0.7 - 3.1 x10E3/uL Final     Lymphocytes, Absolute    Date Value Ref Range Status   07/21/2019 1.52 0.70 - 3.10 10*3/mm3 Final     Monocytes Absolute   Date Value Ref Range Status   04/21/2022 0.5 0.1 - 0.9 x10E3/uL Final     Monocytes, Absolute   Date Value Ref Range Status   07/21/2019 0.30 0.10 - 0.90 10*3/mm3 Final     Eosinophils Absolute   Date Value Ref Range Status   04/21/2022 0.2 0.0 - 0.4 x10E3/uL Final     Eosinophils, Absolute   Date Value Ref Range Status   07/21/2019 0.04 0.00 - 0.40 10*3/mm3 Final     Basophils Absolute   Date Value Ref Range Status   04/21/2022 0.0 0.0 - 0.2 x10E3/uL Final     Basophils, Absolute   Date Value Ref Range Status   07/21/2019 0.01 0.00 - 0.20 10*3/mm3 Final     Immature Grans, Absolute   Date Value Ref Range Status   08/18/2016 0.00 0.00 - 0.03 10*3/mm3 Final       Lab Results   Component Value Date    HGBA1C 6.5 (H) 11/11/2021       Lab Results   Component Value Date    EEADZVHF28 239 11/11/2021       TSH   Date Value Ref Range Status   04/21/2022 1.360 0.450 - 4.500 uIU/mL Final   04/15/2019 1.030 0.270 - 4.200 mIU/mL Final       Lab Results   Component Value Date    CHOL 149 04/18/2019     Lab Results   Component Value Date    TRIG 103 05/19/2021     Lab Results   Component Value Date    HDL 38 (L) 05/19/2021     Lab Results   Component Value Date    LDL 66 05/19/2021     Lab Results   Component Value Date    VLDL 19 05/19/2021     Lab Results   Component Value Date    LDLHDL 2.84 04/18/2019         Procedures    Assessment & Plan   Problems Addressed this Visit     Asymptomatic microscopic hematuria    Relevant Orders    Urinalysis With Microscopic - Urine, Clean Catch    Essential hypertension - Primary    Hyperglycemia    Mixed hyperlipidemia    Relevant Orders    Lipid Panel With / Chol / HDL Ratio      Other Visit Diagnoses     Encounter for hepatitis C screening test for low risk patient        Relevant Orders    Hepatitis C Antibody      Diagnoses       Codes Comments    Essential hypertension    -  Primary  ICD-10-CM: I10  ICD-9-CM: 401.9     Mixed hyperlipidemia     ICD-10-CM: E78.2  ICD-9-CM: 272.2     Hyperglycemia     ICD-10-CM: R73.9  ICD-9-CM: 790.29     Encounter for hepatitis C screening test for low risk patient     ICD-10-CM: Z11.59  ICD-9-CM: V73.89     Asymptomatic microscopic hematuria     ICD-10-CM: R31.21  ICD-9-CM: 599.72       HTN.   Doing well with meds.  CMP normal from 2 weeks ago.    Hyperlipidemia.  No myalgias. Continue atorvastatin. Check FLP.    Hematuria.  New problem, uncontrolled.  Check UA..  Hyperglycemia.  Check A1c.     Orders Placed This Encounter   Procedures   • Lipid Panel With / Chol / HDL Ratio     Order Specific Question:   Release to patient     Answer:   Immediate   • Hepatitis C Antibody     Order Specific Question:   Release to patient     Answer:   Immediate   • Urinalysis With Microscopic - Urine, Clean Catch     Order Specific Question:   Release to patient     Answer:   Immediate       Current Outpatient Medications   Medication Sig Dispense Refill   • aspirin (aspirin) 81 MG EC tablet Take 81 mg by mouth Daily.     • atorvastatin (LIPITOR) 20 MG tablet Take 1 tablet by mouth Every Night. 90 tablet 3   • cetirizine (ZyrTEC) 10 MG tablet Take 10 mg by mouth daily.     • Hydrocortisone, Perianal, (ANUSOL-HC) 2.5 % rectal cream Insert  into the rectum 2 (Two) Times a Day. Prn hemorrhoidal issues. 28 g 11   • losartan (COZAAR) 100 MG tablet Take 1 tablet by mouth Daily. 90 tablet 1   • meclizine (ANTIVERT) 25 MG tablet TAKE 1 TABLET BY MOUTH THREE TIMES A DAY AS NEEDED FOR DIZZINESS. 30 tablet 0   • metoprolol succinate XL (TOPROL-XL) 25 MG 24 hr tablet Take 25 mg by mouth Daily.     • nitroglycerin (NITROSTAT) 0.4 MG SL tablet Place 1 tablet under the tongue Every 5 (Five) Minutes As Needed for Chest Pain (Chest Pain With Systolic Blood Pressure Greater Than 100). 30 tablet 12   • pantoprazole (PROTONIX) 40 MG EC tablet Take 1 tablet by mouth Daily. 90 tablet 3   • QUEtiapine  (SEROquel) 100 MG tablet Take 1 tablet by mouth Every Night. 30 tablet 5   • VENTOLIN  (90 BASE) MCG/ACT inhaler INL 2 PFS PO Q 4 H PRN  3   • warfarin (Coumadin) 3 MG tablet One a day 90 tablet 3     No current facility-administered medications for this visit.       Godfrey Shah had no medications administered during this visit.    No follow-ups on file.    There are no Patient Instructions on file for this visit.

## 2022-05-12 LAB
APPEARANCE UR: CLEAR
BACTERIA #/AREA URNS HPF: NORMAL /[HPF]
BILIRUB UR QL STRIP: NEGATIVE
CASTS URNS QL MICRO: NORMAL /LPF
CHOLEST SERPL-MCNC: 131 MG/DL (ref 100–199)
CHOLEST/HDLC SERPL: 4.1 RATIO (ref 0–5)
COLOR UR: YELLOW
EPI CELLS #/AREA URNS HPF: NORMAL /HPF (ref 0–10)
GLUCOSE UR QL STRIP: NEGATIVE
HBA1C MFR BLD: 6.1 % (ref 4.8–5.6)
HCV AB S/CO SERPL IA: <0.1 S/CO RATIO (ref 0–0.9)
HDLC SERPL-MCNC: 32 MG/DL
HGB UR QL STRIP: NEGATIVE
KETONES UR QL STRIP: ABNORMAL
LDLC SERPL CALC-MCNC: 75 MG/DL (ref 0–99)
LEUKOCYTE ESTERASE UR QL STRIP: NEGATIVE
MICRO URNS: ABNORMAL
MICRO URNS: ABNORMAL
NITRITE UR QL STRIP: NEGATIVE
PH UR STRIP: 5.5 [PH] (ref 5–7.5)
PROT UR QL STRIP: ABNORMAL
RBC #/AREA URNS HPF: NORMAL /HPF (ref 0–2)
SP GR UR STRIP: 1.03 (ref 1–1.03)
TRIGL SERPL-MCNC: 137 MG/DL (ref 0–149)
UROBILINOGEN UR STRIP-MCNC: 1 MG/DL (ref 0.2–1)
VLDLC SERPL CALC-MCNC: 24 MG/DL (ref 5–40)
WBC #/AREA URNS HPF: NORMAL /HPF (ref 0–5)

## 2022-05-17 RX ORDER — METOPROLOL SUCCINATE 25 MG/1
25 TABLET, EXTENDED RELEASE ORAL DAILY
Qty: 90 TABLET | Refills: 3 | Status: SHIPPED | OUTPATIENT
Start: 2022-05-17

## 2022-05-17 RX ORDER — WARFARIN SODIUM 3 MG/1
TABLET ORAL
Qty: 90 TABLET | Refills: 3 | Status: SHIPPED | OUTPATIENT
Start: 2022-05-17

## 2022-05-17 NOTE — TELEPHONE ENCOUNTER
Caller: Godfrey Shah    Relationship: Self    Best call back number:     Requested Prescriptions:   Requested Prescriptions     Pending Prescriptions Disp Refills   • warfarin (Coumadin) 3 MG tablet 90 tablet 3     Sig: One a day             metoprolol succinate XL (TOPROL-XL) 25 MG 24 hr tablet            Pharmacy where request should be sent:  [facility]  [address]  [phone]    Additional details provided by patient: PATIENT IS REQUESTING A 90 DAY SUPPLY ON BOTH MEDICATIONS.     Does the patient have less than a 3 day supply:  [] Yes  [x] No    Chela Celis Rep   05/17/22 16:20 EDT

## 2022-05-27 RX ORDER — PANTOPRAZOLE SODIUM 40 MG/1
40 TABLET, DELAYED RELEASE ORAL DAILY
Qty: 90 TABLET | Refills: 3 | Status: SHIPPED | OUTPATIENT
Start: 2022-05-27 | End: 2023-02-28

## 2022-09-14 ENCOUNTER — TELEPHONE (OUTPATIENT)
Dept: FAMILY MEDICINE CLINIC | Facility: CLINIC | Age: 77
End: 2022-09-14

## 2022-09-14 NOTE — TELEPHONE ENCOUNTER
Caller: Godfrey Shah    Relationship: Self    Best call back number: [phone]    What medication are you requesting: ALBUTERO SULFATE  200 PUFFS      If a prescription is needed, what is your preferred pharmacy and phone number:  [id number] - [place], [place] - [address] - [phone] [facility] [phone] FX

## 2022-09-15 RX ORDER — ALBUTEROL SULFATE 90 UG/1
2 AEROSOL, METERED RESPIRATORY (INHALATION) EVERY 4 HOURS PRN
Qty: 18 G | Refills: 3 | Status: SHIPPED | OUTPATIENT
Start: 2022-09-15 | End: 2022-09-15 | Stop reason: SDUPTHER

## 2022-09-15 RX ORDER — ALBUTEROL SULFATE 90 UG/1
2 AEROSOL, METERED RESPIRATORY (INHALATION) EVERY 4 HOURS PRN
Qty: 18 G | Refills: 3 | Status: SHIPPED | OUTPATIENT
Start: 2022-09-15

## 2022-09-15 NOTE — TELEPHONE ENCOUNTER
Caller: Godfrey Shah    Relationship: Self    Best call back number: [phone]    What was the call regarding: PATIENT IS REQUESTING THIS ALBUTERO SULFATE  200 PUFFS [id number] [address] AT [facility] [phone] [facility] [phone] FX INSTEAD OF [facility].    PATIENT STATED THE MEDICATION WAS FAR MORE EXPENSIVE AT [facility] THAN IT USUALLY IS AT [facility], AND SO WOULD LIKE IT SENT BACK TO [facility].    Do you require a callback: PLEASE CALL IF NEEDED TO DISCUSS.

## 2022-09-15 NOTE — TELEPHONE ENCOUNTER
PATIENT IS ASKING TO GET GENERIC VERSION OF THIS ALBUTEROL SENT TO THE [facility].      CONFIRMED PHARMACY  [id number] - [place], [place] - [address] - [phone]  - [phone] FX     HE WOULD LIKE A CALLBACK WHEN THIS IS SENT OVER TO THE [facility].      CALLBACK NUMBER IS  [phone]

## 2022-10-31 DIAGNOSIS — I25.10 CORONARY ARTERY DISEASE INVOLVING NATIVE CORONARY ARTERY OF NATIVE HEART WITHOUT ANGINA PECTORIS: ICD-10-CM

## 2022-10-31 RX ORDER — ATORVASTATIN CALCIUM 20 MG/1
20 TABLET, FILM COATED ORAL NIGHTLY
Qty: 90 TABLET | Refills: 3 | Status: SHIPPED | OUTPATIENT
Start: 2022-10-31

## 2022-11-16 ENCOUNTER — OFFICE VISIT (OUTPATIENT)
Dept: FAMILY MEDICINE CLINIC | Facility: CLINIC | Age: 77
End: 2022-11-16

## 2022-11-16 VITALS
OXYGEN SATURATION: 94 % | HEIGHT: 69 IN | DIASTOLIC BLOOD PRESSURE: 70 MMHG | BODY MASS INDEX: 32.19 KG/M2 | WEIGHT: 217.3 LBS | TEMPERATURE: 98 F | SYSTOLIC BLOOD PRESSURE: 120 MMHG | HEART RATE: 61 BPM

## 2022-11-16 DIAGNOSIS — Z00.00 MEDICARE ANNUAL WELLNESS VISIT, SUBSEQUENT: Primary | ICD-10-CM

## 2022-11-16 DIAGNOSIS — E78.2 MIXED HYPERLIPIDEMIA: ICD-10-CM

## 2022-11-16 DIAGNOSIS — K64.8 OTHER HEMORRHOIDS: ICD-10-CM

## 2022-11-16 DIAGNOSIS — E11.59 TYPE 2 DIABETES MELLITUS WITH OTHER CIRCULATORY COMPLICATION, WITHOUT LONG-TERM CURRENT USE OF INSULIN: ICD-10-CM

## 2022-11-16 PROCEDURE — G0439 PPPS, SUBSEQ VISIT: HCPCS | Performed by: FAMILY MEDICINE

## 2022-11-16 PROCEDURE — 1170F FXNL STATUS ASSESSED: CPT | Performed by: FAMILY MEDICINE

## 2022-11-16 PROCEDURE — 1159F MED LIST DOCD IN RCRD: CPT | Performed by: FAMILY MEDICINE

## 2022-11-16 PROCEDURE — 99213 OFFICE O/P EST LOW 20 MIN: CPT | Performed by: FAMILY MEDICINE

## 2022-11-16 RX ORDER — LOSARTAN POTASSIUM AND HYDROCHLOROTHIAZIDE 25; 100 MG/1; MG/1
1 TABLET ORAL DAILY
Qty: 90 TABLET | Refills: 3
Start: 2022-11-16

## 2022-11-16 NOTE — PROGRESS NOTES
The ABCs of the Annual Wellness Visit  Subsequent Medicare Wellness Visit    Chief Complaint   Patient presents with   • Medicare Wellness-subsequent      Subjective    History of Present Illness:  Godfrey Shah is a 77 y.o. male who presents for a Subsequent Medicare Wellness Visit.  C/o trouble with hemorrhoids.  Flares up often and for years.  No help with hydrocortisone cream.    F/U HTN.  Doing wel with meds.  Had to change losartan to losartan/hctz.  The following portions of the patient's history were reviewed and   updated as appropriate: allergies, current medications, past family history, past medical history, past social history, past surgical history and problem list.    Compared to one year ago, the patient feels his physical   health is the same.    Compared to one year ago, the patient feels his mental   health is the same.    Recent Hospitalizations:  He was not admitted to the hospital during the last year.       Current Medical Providers:  Patient Care Team:  Godfrey Jackson MD as PCP - General (Family Medicine)    Outpatient Medications Prior to Visit   Medication Sig Dispense Refill   • albuterol sulfate HFA (Ventolin HFA) 108 (90 Base) MCG/ACT inhaler Inhale 2 puffs Every 4 (Four) Hours As Needed for Wheezing. 18 g 3   • aspirin (aspirin) 81 MG EC tablet Take 81 mg by mouth Daily.     • atorvastatin (LIPITOR) 20 MG tablet TAKE 1 TABLET BY MOUTH EVERY NIGHT 90 tablet 3   • cetirizine (ZyrTEC) 10 MG tablet Take 10 mg by mouth daily.     • meclizine (ANTIVERT) 25 MG tablet TAKE 1 TABLET BY MOUTH THREE TIMES A DAY AS NEEDED FOR DIZZINESS. 30 tablet 0   • metoprolol succinate XL (TOPROL-XL) 25 MG 24 hr tablet Take 1 tablet by mouth Daily. 90 tablet 3   • nitroglycerin (NITROSTAT) 0.4 MG SL tablet Place 1 tablet under the tongue Every 5 (Five) Minutes As Needed for Chest Pain (Chest Pain With Systolic Blood Pressure Greater Than 100). 30 tablet 12   • pantoprazole (PROTONIX) 40 MG EC tablet TAKE  1 TABLET BY MOUTH DAILY 90 tablet 3   • QUEtiapine (SEROquel) 100 MG tablet Take 1 tablet by mouth Every Night. 30 tablet 5   • warfarin (Coumadin) 3 MG tablet One a day 90 tablet 3   • Hydrocortisone, Perianal, (ANUSOL-HC) 2.5 % rectal cream Insert  into the rectum 2 (Two) Times a Day. Prn hemorrhoidal issues. 28 g 11   • losartan (COZAAR) 100 MG tablet Take 1 tablet by mouth Daily. 90 tablet 1     No facility-administered medications prior to visit.       No opioid medication identified on active medication list. I have reviewed chart for other potential  high risk medication/s and harmful drug interactions in the elderly.          Aspirin is on active medication list. Aspirin use is indicated based on review of current medical condition/s. Pros and cons of this therapy have been discussed today. Benefits of this medication outweigh potential harm.  Patient has been encouraged to continue taking this medication.  .      Patient Active Problem List   Diagnosis   • Bradycardia, sinus   • Sleep-disordered breathing   • Symptomatic bradycardia   • AV block   • Prostate cancer (HCC)   • Pacemaker   • Insomnia   • RYAN (obstructive sleep apnea)   • Nephrolithiasis   • Hyperglycemia   • GERD (gastroesophageal reflux disease)   • Osteoarthritis   • COPD (chronic obstructive pulmonary disease) (HCC)   • Inability to attain erection   • Coronary artery disease   • Visit for suture removal   • Medicare annual wellness visit, subsequent   • Mixed hyperlipidemia   • COPD with acute exacerbation (HCC)   • Essential hypertension   • Paroxysmal atrial fibrillation (HCC)   • Other age-related cataract   • High risk medication use   • Asymptomatic microscopic hematuria   • Type 2 diabetes mellitus with circulatory disorder, without long-term current use of insulin (HCC)   • Other hemorrhoids     Advance Care Planning  Advance Directive is on file.  ACP discussion was held with the patient during this visit. Patient has an advance  "directive in EMR which is still valid.     Review of Systems   Constitutional: Negative for chills and diaphoresis.   HENT: Negative for nosebleeds and postnasal drip.    Respiratory: Negative for cough and shortness of breath.    Cardiovascular: Negative for chest pain and leg swelling.        Objective    Vitals:    11/16/22 0803   BP: 120/70   BP Location: Left arm   Patient Position: Sitting   Cuff Size: Adult   Pulse: 61   Temp: 98 °F (36.7 °C)   SpO2: 94%   Weight: 98.6 kg (217 lb 4.8 oz)   Height: 174 cm (68.5\")     Estimated body mass index is 32.56 kg/m² as calculated from the following:    Height as of this encounter: 174 cm (68.5\").    Weight as of this encounter: 98.6 kg (217 lb 4.8 oz).    BMI is >= 30 and <35. (Class 1 Obesity). The following options were offered after discussion;: exercise counseling/recommendations      Does the patient have evidence of cognitive impairment? No    Physical Exam  Constitutional:       Appearance: Normal appearance.   HENT:      Head: Normocephalic.      Nose: Nose normal.   Cardiovascular:      Rate and Rhythm: Normal rate and regular rhythm.   Neurological:      Mental Status: He is alert.                 HEALTH RISK ASSESSMENT    Smoking Status:  Social History     Tobacco Use   Smoking Status Former   Smokeless Tobacco Never   Tobacco Comments    Quit 2000     Alcohol Consumption:  Social History     Substance and Sexual Activity   Alcohol Use No     Fall Risk Screen:    [facility] Fall Risk Assessment has not been completed.    Depression Screening:  PHQ-2/PHQ-9 Depression Screening 11/16/2022   Retired Total Score -   Little Interest or Pleasure in Doing Things 0-->not at all   PHQ-9: Brief Depression Severity Measure Score 0       Health Habits and Functional and Cognitive Screening:  Functional & Cognitive Status 11/16/2022   Do you have difficulty preparing food and eating? No   Do you have difficulty bathing yourself, getting dressed or grooming yourself? No   Do " you have difficulty using the toilet? No   Do you have difficulty moving around from place to place? Yes   Do you have trouble with steps or getting out of a bed or a chair? No   Current Diet Well Balanced Diet   Dental Exam Up to date   Eye Exam Up to date   Exercise (times per week) 3 times per week   Current Exercises Include Walking        Exercise Comment -   Current Exercise Activities Include -   Do you need help using the phone?  No   Are you deaf or do you have serious difficulty hearing?  No   Do you need help with transportation? No   Do you need help shopping? No   Do you need help preparing meals?  No   Do you need help with housework?  No   Do you need help with laundry? No   Do you need help taking your medications? No   Do you need help managing money? No   Do you ever drive or ride in a car without wearing a seat belt? No   Have you felt unusual stress, anger or loneliness in the last month? No   Who do you live with? Spouse   If you need help, do you have trouble finding someone available to you? No   Have you been bothered in the last four weeks by sexual problems? -   Do you have difficulty concentrating, remembering or making decisions? No       Age-appropriate Screening Schedule:  Refer to the list below for future screening recommendations based on patient's age, sex and/or medical conditions. Orders for these recommended tests are listed in the plan section. The patient has been provided with a written plan.    Health Maintenance   Topic Date Due   • ZOSTER VACCINE (2 of 3) 09/24/2012   • LIPID PANEL  05/11/2023   • TDAP/TD VACCINES (2 - Tdap) 06/16/2026   • INFLUENZA VACCINE  Completed              Assessment & Plan   CMS Preventative Services Quick Reference  Risk Factors Identified During Encounter  Inactivity/Sedentary  The above risks/problems have been discussed with the patient.  Follow up actions/plans if indicated are seen below in the Assessment/Plan Section.  Pertinent information  has been shared with the patient in the After Visit Summary.    Diagnoses and all orders for this visit:    1. Medicare annual wellness visit, subsequent (Primary)  -     CBC & Differential    2. Type 2 diabetes mellitus with other circulatory complication, without long-term current use of insulin (HCC)  -     Comprehensive Metabolic Panel  -     Hemoglobin A1c    3. Mixed hyperlipidemia  -     Lipid Panel With / Chol / HDL Ratio    4. Other hemorrhoids  -     Ambulatory Referral to Colorectal Surgery    Other orders  -     losartan-hydrochlorothiazide (Hyzaar) 100-25 MG per tablet; Take 1 tablet by mouth Daily.  Dispense: 90 tablet; Refill: 3        Hemorrhoids.  Chronic, uncontrolled.  To colorectal surgeon.  Use senna S one a day.    Hyperlipidemia.  Check CMP, FLP.    DM2.  Controlled.  Check A1c.      Preventive Counseling:  Encouraged to stay active.  Covid vaccine UTD.  Flu utd.  Pneumovax UTD.  Colonoscopy UTD.    Dentist UTD.  Optho UTD.      Follow Up:   Return in about 6 months (around 5/16/2023).     An After Visit Summary and PPPS were made available to the patient.

## 2022-11-17 LAB
ALBUMIN SERPL-MCNC: 4.3 G/DL (ref 3.5–5.2)
ALBUMIN/GLOB SERPL: 1.9 G/DL
ALP SERPL-CCNC: 80 U/L (ref 39–117)
ALT SERPL-CCNC: 12 U/L (ref 1–41)
AST SERPL-CCNC: 21 U/L (ref 1–40)
BASOPHILS # BLD AUTO: 0.03 10*3/MM3 (ref 0–0.2)
BASOPHILS NFR BLD AUTO: 0.6 % (ref 0–1.5)
BILIRUB SERPL-MCNC: 0.8 MG/DL (ref 0–1.2)
BUN SERPL-MCNC: 9 MG/DL (ref 8–23)
BUN/CREAT SERPL: 8.1 (ref 7–25)
CALCIUM SERPL-MCNC: 8.8 MG/DL (ref 8.6–10.5)
CHLORIDE SERPL-SCNC: 105 MMOL/L (ref 98–107)
CHOLEST SERPL-MCNC: 136 MG/DL (ref 0–200)
CHOLEST/HDLC SERPL: 3.78 {RATIO}
CO2 SERPL-SCNC: 28.1 MMOL/L (ref 22–29)
CREAT SERPL-MCNC: 1.11 MG/DL (ref 0.76–1.27)
EGFRCR SERPLBLD CKD-EPI 2021: 68.4 ML/MIN/1.73
EOSINOPHIL # BLD AUTO: 0.23 10*3/MM3 (ref 0–0.4)
EOSINOPHIL NFR BLD AUTO: 4.4 % (ref 0.3–6.2)
ERYTHROCYTE [DISTWIDTH] IN BLOOD BY AUTOMATED COUNT: 12.9 % (ref 12.3–15.4)
GLOBULIN SER CALC-MCNC: 2.3 GM/DL
GLUCOSE SERPL-MCNC: 114 MG/DL (ref 65–99)
HBA1C MFR BLD: 6.2 % (ref 4.8–5.6)
HCT VFR BLD AUTO: 45.4 % (ref 37.5–51)
HDLC SERPL-MCNC: 36 MG/DL (ref 40–60)
HGB BLD-MCNC: 15.2 G/DL (ref 13–17.7)
IMM GRANULOCYTES # BLD AUTO: 0.01 10*3/MM3 (ref 0–0.05)
IMM GRANULOCYTES NFR BLD AUTO: 0.2 % (ref 0–0.5)
LDLC SERPL CALC-MCNC: 77 MG/DL (ref 0–100)
LYMPHOCYTES # BLD AUTO: 2.55 10*3/MM3 (ref 0.7–3.1)
LYMPHOCYTES NFR BLD AUTO: 49.2 % (ref 19.6–45.3)
MCH RBC QN AUTO: 31.9 PG (ref 26.6–33)
MCHC RBC AUTO-ENTMCNC: 33.5 G/DL (ref 31.5–35.7)
MCV RBC AUTO: 95.4 FL (ref 79–97)
MONOCYTES # BLD AUTO: 0.47 10*3/MM3 (ref 0.1–0.9)
MONOCYTES NFR BLD AUTO: 9.1 % (ref 5–12)
NEUTROPHILS # BLD AUTO: 1.89 10*3/MM3 (ref 1.7–7)
NEUTROPHILS NFR BLD AUTO: 36.5 % (ref 42.7–76)
NRBC BLD AUTO-RTO: 0 /100 WBC (ref 0–0.2)
PLATELET # BLD AUTO: 145 10*3/MM3 (ref 140–450)
POTASSIUM SERPL-SCNC: 3.7 MMOL/L (ref 3.5–5.2)
PROT SERPL-MCNC: 6.6 G/DL (ref 6–8.5)
RBC # BLD AUTO: 4.76 10*6/MM3 (ref 4.14–5.8)
SODIUM SERPL-SCNC: 141 MMOL/L (ref 136–145)
TRIGL SERPL-MCNC: 131 MG/DL (ref 0–150)
VLDLC SERPL CALC-MCNC: 23 MG/DL (ref 5–40)
WBC # BLD AUTO: 5.18 10*3/MM3 (ref 3.4–10.8)

## 2022-12-28 ENCOUNTER — OFFICE VISIT (OUTPATIENT)
Dept: SURGERY | Facility: CLINIC | Age: 77
End: 2022-12-28

## 2022-12-28 VITALS
WEIGHT: 220 LBS | OXYGEN SATURATION: 94 % | HEIGHT: 68 IN | HEART RATE: 69 BPM | SYSTOLIC BLOOD PRESSURE: 136 MMHG | BODY MASS INDEX: 33.34 KG/M2 | DIASTOLIC BLOOD PRESSURE: 72 MMHG | TEMPERATURE: 97.7 F

## 2022-12-28 DIAGNOSIS — K62.5 RECTAL BLEEDING: Primary | ICD-10-CM

## 2022-12-28 DIAGNOSIS — K62.89 RECTAL PAIN: ICD-10-CM

## 2022-12-28 PROBLEM — H81.10 BENIGN PAROXYSMAL POSITIONAL VERTIGO: Status: ACTIVE | Noted: 2021-01-28

## 2022-12-28 PROBLEM — I44.0 FIRST DEGREE HEART BLOCK: Status: ACTIVE | Noted: 2021-01-28

## 2022-12-28 PROCEDURE — 1160F RVW MEDS BY RX/DR IN RCRD: CPT | Performed by: PHYSICIAN ASSISTANT

## 2022-12-28 PROCEDURE — 1159F MED LIST DOCD IN RCRD: CPT | Performed by: PHYSICIAN ASSISTANT

## 2022-12-28 PROCEDURE — 99203 OFFICE O/P NEW LOW 30 MIN: CPT | Performed by: PHYSICIAN ASSISTANT

## 2022-12-28 PROCEDURE — 46600 DIAGNOSTIC ANOSCOPY SPX: CPT | Performed by: PHYSICIAN ASSISTANT

## 2022-12-28 RX ORDER — LATANOPROST 50 UG/ML
SOLUTION/ DROPS OPHTHALMIC
COMMUNITY
Start: 2022-11-16

## 2022-12-28 NOTE — PROGRESS NOTES
Godfrey Shah is a 77 y.o. male who is seen as a consult at the request of Godfrey Jackson MD for Rectal Bleeding.      HPI:  Pt with a Hx of A-fib for which he is chronically anticoagulated with Coumadin presents today for evaluation of RB, which he contributes to hemorrhoids.   Pt has a Hx of prostate cancer for which he received radiation.   RB started <6 months ago following a prostate biopsy.   Pt reports spotting bleeding with BM.   Denies any perianal swelling or prolapsing tissue.   Used PrepH and HC 1% cream with some improvement.   Denies any RB x1 month.     Pt experiencing intermittent perianal pain with BMs.  Describes pain as a tearing sensation.   Pain lingers 2-3 hours after BMs.  Started taking 2 stool softeners QD with improvement.     Regular BMs  1 BM Daily  [place]: 4 and no straining with stool softeners  Taking 2 doses of fiber daily  Tried Miralax in the past, but D/C due to abdominal discomfort  Pt does state that he missed a few doses of his stool softeners and passed a hard BM around the time of onset of RB.     Last colonoscopy in 2017 performed by Dr. Diaz. Pt states he did not have any polyps at that time, but did have polyps removed during a colonoscopy performed 5 years prior.   No known FHx of colon polyps or colon cancer.     Past Medical History:   Diagnosis Date   • Acute bronchitis    • Allergic rhinitis    • Anxiety    • Arm mass, right    • ASCVD (arteriosclerotic cardiovascular disease)    • AV block    • Biceps muscle tear    • BPPV (benign paroxysmal positional vertigo)    • Bradycardia    • CAD (coronary artery disease)    • Colon polyps    • COPD (chronic obstructive pulmonary disease) (HCC)    • COPD exacerbation (HCC)    • Coronary artery disease    • Depression with anxiety    • Eczema    • Elevated PSA    • GERD (gastroesophageal reflux disease)    • Heart block    • Hemorrhoids    • Hyperglycemia    • Hyperlipidemia    • Hypertension    • Inability to attain  erection    • Insomnia    • Nephrolithiasis    • Obstructive chronic bronchitis (HCC)    • RYAN (obstructive sleep apnea)    • Osteoarthritis    • Other fatigue    • Pacemaker    • Prostate cancer (HCC) 05/2017   • Prostate cancer (HCC)    • Rotator cuff tendonitis, right    • Syncope        Past Surgical History:   Procedure Laterality Date   • CARDIAC CATHETERIZATION N/A 4/17/2019    Procedure: LEFT HEART CATH;  Surgeon: Lalo Naranjo MD;  Location:  [facility];  Service: Cardiovascular   • CARDIAC CATHETERIZATION N/A 4/17/2019    Procedure: CORONARY ANGIOGRAPHY;  Surgeon: Lalo Naranjo MD;  Location:  [facility];  Service: Cardiovascular   • CARDIAC CATHETERIZATION N/A 4/17/2019    Procedure: Left ventriculography;  Surgeon: Lalo Naranjo MD;  Location:  [facility];  Service: Cardiovascular   • CARDIAC ELECTROPHYSIOLOGY PROCEDURE Left 4/18/2019    Procedure: Pacemaker DC new;  Surgeon: Curtis Moore DO;  Location:  [facility];  Service: Cardiovascular   • COLONOSCOPY  04/2017   • CORONARY ANGIOPLASTY WITH STENT PLACEMENT     • PACEMAKER IMPLANTATION     • PENILE PROSTHESIS IMPLANT     • PENIS SURGERY      Penile implant   • PROSTATE BIOPSY  01/2015    histroy of needle biopsy of prostate - normal biopsy       Social History:   reports that he has quit smoking. He has been exposed to tobacco smoke. He has never used smokeless tobacco. Drug use questions deferred to the physician. He reports that he does not drink alcohol.      Marriage status:     Family History   Problem Relation Age of Onset   • No Known Problems Mother    • No Known Problems Father          Current Outpatient Medications:   •  albuterol sulfate HFA (Ventolin HFA) 108 (90 Base) MCG/ACT inhaler, Inhale 2 puffs Every 4 (Four) Hours As Needed for Wheezing., Disp: 18 g, Rfl: 3  •  aspirin (aspirin) 81 MG EC tablet, Take 81 mg by mouth Daily., Disp: , Rfl:    •  atorvastatin (LIPITOR) 20 MG tablet, TAKE 1 TABLET BY MOUTH EVERY NIGHT, Disp: 90 tablet, Rfl: 3  •  cetirizine (ZyrTEC) 10 MG tablet, Take 10 mg by mouth daily., Disp: , Rfl:   •  latanoprost (XALATAN) 0.005 % ophthalmic solution, instill 1 drop by ophthalmic route  every day into both eye(s) every day the evening, Disp: , Rfl:   •  losartan-hydrochlorothiazide (Hyzaar) 100-25 MG per tablet, Take 1 tablet by mouth Daily., Disp: 90 tablet, Rfl: 3  •  meclizine (ANTIVERT) 25 MG tablet, TAKE 1 TABLET BY MOUTH THREE TIMES A DAY AS NEEDED FOR DIZZINESS., Disp: 30 tablet, Rfl: 0  •  metoprolol succinate XL (TOPROL-XL) 25 MG 24 hr tablet, Take 1 tablet by mouth Daily., Disp: 90 tablet, Rfl: 3  •  nitroglycerin (NITROSTAT) 0.4 MG SL tablet, Place 1 tablet under the tongue Every 5 (Five) Minutes As Needed for Chest Pain (Chest Pain With Systolic Blood Pressure Greater Than 100)., Disp: 30 tablet, Rfl: 12  •  pantoprazole (PROTONIX) 40 MG EC tablet, TAKE 1 TABLET BY MOUTH DAILY, Disp: 90 tablet, Rfl: 3  •  QUEtiapine (SEROquel) 100 MG tablet, Take 1 tablet by mouth Every Night., Disp: 30 tablet, Rfl: 5  •  warfarin (Coumadin) 3 MG tablet, One a day, Disp: 90 tablet, Rfl: 3    Allergy  Patient has no known allergies.    Review of Systems   Constitutional: Negative for decreased appetite and weight gain.   HENT: Negative for congestion, hearing loss and hoarse voice.    Eyes: Negative for blurred vision, discharge and visual disturbance.   Cardiovascular: Positive for palpitations. Negative for chest pain, cyanosis and leg swelling.   Respiratory: Positive for shortness of breath and wheezing. Negative for cough, sleep disturbances due to breathing and snoring.    Endocrine: Negative for cold intolerance and heat intolerance.   Hematologic/Lymphatic: Does not bruise/bleed easily.   Skin: Negative for itching, poor wound healing and skin cancer.   Musculoskeletal: Negative for arthritis, back pain, joint pain and joint  swelling.   Gastrointestinal: Positive for constipation, hematochezia and hemorrhoids. Negative for abdominal pain, change in bowel habit and bowel incontinence.   Genitourinary: Negative for bladder incontinence, dysuria and hematuria.   Neurological: Negative for brief paralysis, excessive daytime sleepiness, dizziness, focal weakness, headaches, light-headedness and weakness.   Psychiatric/Behavioral: Negative for altered mental status and hallucinations. The patient does not have insomnia.    Allergic/Immunologic: Negative for HIV exposure and persistent infections.   All other systems reviewed and are negative.      Vitals:    12/28/22 1032   BP: 136/72   Pulse: 69   Temp: 97.7 °F (36.5 °C)   SpO2: 94%     Body mass index is 33.45 kg/m².    Physical Exam  Exam conducted with a chaperone present.   Constitutional:       General: He is not in acute distress.     Appearance: He is well-developed.   HENT:      Head: Normocephalic and atraumatic.      Nose: Nose normal.   Eyes:      Conjunctiva/sclera: Conjunctivae normal.      Pupils: Pupils are equal, round, and reactive to light.   Neck:      Trachea: No tracheal deviation.   Pulmonary:      Effort: Pulmonary effort is normal. No respiratory distress.      Breath sounds: Normal breath sounds.   Abdominal:      General: Bowel sounds are normal. There is no distension.      Palpations: Abdomen is soft.   Genitourinary:     Comments: Perianal exam: No enlargement of external hemorrhoids. No anal fissure visualized.   MALLIKA- Good tone, no masses  Anoscopy performed:  Grade I x 3 internal hem   Musculoskeletal:         General: No deformity. Normal range of motion.      Cervical back: Normal range of motion.   Skin:     General: Skin is warm and dry.   Neurological:      Mental Status: He is alert and oriented to person, place, and time.      Cranial Nerves: No cranial nerve deficit.      Coordination: Coordination normal.      Gait: Gait normal.   Psychiatric:          Behavior: Behavior normal.         Judgment: Judgment normal.         Review of Medical Record:  I reviewed PMHx, surgical Hx, FHx, and current medications.     Assessment:  1. Rectal bleeding    2. Rectal pain    - New    Plan:  - Perianal pain concerning for an anal fissure. Discussed trial of Diltiazem/Lidocaine compound cream. Pt states he would like to hold off on this at this time as he is not currently experiencing symptoms.    - Continue stool softeners and fiber therapy.   - HC 1% cream PRN for hemorrhoidal irritation and enlargement.    - Will consider repeat colonoscopy for further evaluation if RB reoccurs.   - Pt states he would like to follow up in 3 months. Encouraged Pt to return for any fevers, RB, and/or worsening rectal pain. Pt expresses understanding.

## 2023-01-23 ENCOUNTER — TELEPHONE (OUTPATIENT)
Dept: SURGERY | Facility: CLINIC | Age: 78
End: 2023-01-23
Payer: MEDICARE

## 2023-01-23 NOTE — TELEPHONE ENCOUNTER
Pt called, said he saw Hanane last week and she mentioned HC cream. Pt would Rx sent to [facility] [address].

## 2023-01-24 RX ORDER — DIAPER,BRIEF,INFANT-TODD,DISP
EACH MISCELLANEOUS
Qty: 30 G | Refills: 1 | Status: SHIPPED | OUTPATIENT
Start: 2023-01-24 | End: 2024-01-24

## 2023-02-03 ENCOUNTER — TELEPHONE (OUTPATIENT)
Dept: SURGERY | Facility: CLINIC | Age: 78
End: 2023-02-03
Payer: MEDICARE

## 2023-02-03 NOTE — TELEPHONE ENCOUNTER
"pt called into the office, last seen mati on 12/28 for rectal bleeding and rectal pain. Pt states he has been taking 4 fiber choice chewables a day, and he takes 3 stool softeners a day he states it is \"almost liquid\" Pt is not currently experiencing any rectal bleeding but after ever bowel movement for 6 hours he is experiencing very sharp pain in his rectum. I see at his visit they discussed a trial of Diltiazem/Lidocaine and pt requested to hold off due to not having symptoms at the time. Pt called on 01/23 and asked about the HC cream him and Mati talked about at his appointment, RX was sent in and pt has been using that a couple times a day per pt but his symptoms have not improved. I did move his appt up with Mati to her first available per pts request. He was wondering if there was anything else he could add to help his symptoms.   sent message to   Tiesha john pa-c, who recommended pt stops his stool softness and HC cream and to call in a few days to report symptoms. Called pt and left voicemail  With this information.     "

## 2023-02-06 ENCOUNTER — TELEPHONE (OUTPATIENT)
Dept: SURGERY | Facility: CLINIC | Age: 78
End: 2023-02-06
Payer: MEDICARE

## 2023-02-06 NOTE — TELEPHONE ENCOUNTER
"Pt called to report symptoms, said he spoke with someone last week and was told to stop stool softeners and HC cream. Pt said he was \"miserable\" all weekend. Said every time he has a BM he has swelling and pain that lasts several hours. Pt wants to know what to do in meantime because he hansen not have another appt for a week.  "

## 2023-02-21 ENCOUNTER — OFFICE VISIT (OUTPATIENT)
Dept: SURGERY | Facility: CLINIC | Age: 78
End: 2023-02-21
Payer: MEDICARE

## 2023-02-21 VITALS
SYSTOLIC BLOOD PRESSURE: 116 MMHG | BODY MASS INDEX: 35.63 KG/M2 | OXYGEN SATURATION: 97 % | HEIGHT: 66 IN | TEMPERATURE: 97.2 F | HEART RATE: 61 BPM | DIASTOLIC BLOOD PRESSURE: 84 MMHG | WEIGHT: 221.7 LBS

## 2023-02-21 DIAGNOSIS — K60.2 ANAL FISSURE: Primary | ICD-10-CM

## 2023-02-21 PROCEDURE — 99213 OFFICE O/P EST LOW 20 MIN: CPT | Performed by: PHYSICIAN ASSISTANT

## 2023-02-21 NOTE — PROGRESS NOTES
"Godfrey Shah is a 77 y.o. male in for follow up of Anal fissure    Pt was last seen in the office on 12/28/2022 for evaluation of intermittent tearing rectal pain and bleeding.   Symptoms were concerning for an anal fissure and Pt was offered an Rx for Diltiazem/Lidocaine compound cream. He declined the Rx at that time as he was asymptomatic.   Pt called the office 02/06/2023 for recurrent symptoms and Rx for the compound cream was sent to the compound pharmacy.   States he is applying the cream TID, but still experiencing rectal pain during and after defecation.   He is taking Senokot x2 and Colace x1 daily.   He is also taking 3 doses of fiber daily and having soft BMs.     /84 (BP Location: Left arm, Patient Position: Sitting, Cuff Size: Small Adult)   Pulse 61   Temp 97.2 °F (36.2 °C) (Temporal)   Ht 167.6 cm (66\")   Wt 101 kg (221 lb 11.2 oz)   SpO2 97%   BMI 35.78 kg/m²   Body mass index is 35.78 kg/m².      PE:  Physical Exam  Exam conducted with a chaperone present.   Constitutional:       General: He is not in acute distress.     Appearance: He is well-developed.   HENT:      Head: Normocephalic and atraumatic.   Abdominal:      General: There is no distension.      Palpations: Abdomen is soft.   Genitourinary:     Comments: Perianal exam: Posterior anal fissure  Musculoskeletal:         General: Normal range of motion.   Neurological:      Mental Status: He is alert.   Psychiatric:         Thought Content: Thought content normal.         Assessment:   1. Anal fissure      Plan:  - Pt encouraged to continue application of Diltiazem/Lidocaine compound cream as it can take several weeks for the fissure to heal.   - Will consider chemical sphincterotomy with Botox if symptoms do not improve with conservative management.   - Continue SS PRN  - Increase fiber to 2 doses BID  - Follow up in 4 weeks.         "

## 2023-02-28 RX ORDER — PANTOPRAZOLE SODIUM 40 MG/1
TABLET, DELAYED RELEASE ORAL
Qty: 90 TABLET | Refills: 2 | Status: SHIPPED | OUTPATIENT
Start: 2023-02-28

## 2023-03-20 RX ORDER — QUETIAPINE FUMARATE 100 MG/1
TABLET, FILM COATED ORAL
Qty: 90 TABLET | Refills: 1 | Status: SHIPPED | OUTPATIENT
Start: 2023-03-20

## 2023-03-22 ENCOUNTER — OFFICE VISIT (OUTPATIENT)
Dept: SURGERY | Facility: CLINIC | Age: 78
End: 2023-03-22
Payer: MEDICARE

## 2023-03-22 VITALS
SYSTOLIC BLOOD PRESSURE: 154 MMHG | TEMPERATURE: 97.2 F | BODY MASS INDEX: 33.53 KG/M2 | DIASTOLIC BLOOD PRESSURE: 84 MMHG | WEIGHT: 226.4 LBS | HEIGHT: 69 IN | HEART RATE: 56 BPM | OXYGEN SATURATION: 97 %

## 2023-03-22 DIAGNOSIS — K60.2 ANAL FISSURE: Primary | ICD-10-CM

## 2023-03-22 PROCEDURE — 1160F RVW MEDS BY RX/DR IN RCRD: CPT | Performed by: PHYSICIAN ASSISTANT

## 2023-03-22 PROCEDURE — 3079F DIAST BP 80-89 MM HG: CPT | Performed by: PHYSICIAN ASSISTANT

## 2023-03-22 PROCEDURE — 99214 OFFICE O/P EST MOD 30 MIN: CPT | Performed by: PHYSICIAN ASSISTANT

## 2023-03-22 PROCEDURE — 3077F SYST BP >= 140 MM HG: CPT | Performed by: PHYSICIAN ASSISTANT

## 2023-03-22 PROCEDURE — 1159F MED LIST DOCD IN RCRD: CPT | Performed by: PHYSICIAN ASSISTANT

## 2023-03-22 RX ORDER — LACTULOSE 10 G/15ML
10 SOLUTION ORAL 2 TIMES DAILY
Qty: 900 ML | Refills: 3 | Status: SHIPPED | OUTPATIENT
Start: 2023-03-22 | End: 2023-04-21

## 2023-03-22 NOTE — PROGRESS NOTES
"Godfrey Shah is a 77 y.o. male in for follow up of Anal fissure    Pt was first seen in our office on 12/28/2022 for evaluation of intermittent tearing rectal pain and bleeding.   Symptoms were concerning for an anal fissure and Pt was offered an Rx for Diltiazem/Lidocaine compound cream. He declined the Rx at that time as he was asymptomatic.     Pt called the office 02/06/2023 for recurrent symptoms and Rx for the compound cream was sent to the compound pharmacy.   He was seen in our office 02/21/2023 and was found to have a posterior anal fissure. He was instructed to continue the compound cream TID and presents today for a follow up.  Pt states he was doing well for the past 2 weeks until he had a large BM yesterday.  Now having increased pain.   Current bowel regimen consists of 2 doses of fiber BID, Senokot x2, Colace x1, and Miralax PRN.     Pt chronically anticoagulated with Warfarin.     /84 (BP Location: Left arm, Patient Position: Sitting, Cuff Size: Large Adult)   Pulse 56   Temp 97.2 °F (36.2 °C) (Temporal)   Ht 175.3 cm (69\")   Wt 103 kg (226 lb 6.4 oz)   SpO2 97%   BMI 33.43 kg/m²   Body mass index is 33.43 kg/m².      PE:  Physical Exam  Constitutional:       General: He is not in acute distress.     Appearance: He is well-developed.   HENT:      Head: Normocephalic and atraumatic.   Abdominal:      General: There is no distension.      Palpations: Abdomen is soft.   Musculoskeletal:         General: Normal range of motion.   Neurological:      Mental Status: He is alert.   Psychiatric:         Thought Content: Thought content normal.         Assessment:   1. Anal fissure      Plan:  - Discussed treatment options for the anal fissure including continuing conservative management, Chemical sphincterotomy with Botox, and LIAS. Benefits, risks, success rate, and recovery time for each option discussed with Pt. After consideration, Pt states he would like to continue conservative treatment " with the Diltiazem/Lidocaine compound cream.   - Discussed repeating a colonoscopy for further evaluation of RB. Pt states he would like to go home and consider this. Would like to discuss as next office visit.   - For constipation, recommended Lactulose. Rx sent to pharmacy.   - Continue fiber. Goal: 30-40 grams daily  - SS PRN  - Follow up in 4 weeks.

## 2023-04-19 ENCOUNTER — OFFICE VISIT (OUTPATIENT)
Dept: SURGERY | Facility: CLINIC | Age: 78
End: 2023-04-19
Payer: MEDICARE

## 2023-04-19 VITALS
BODY MASS INDEX: 33.17 KG/M2 | HEART RATE: 67 BPM | OXYGEN SATURATION: 94 % | SYSTOLIC BLOOD PRESSURE: 132 MMHG | WEIGHT: 224.6 LBS | DIASTOLIC BLOOD PRESSURE: 76 MMHG

## 2023-04-19 DIAGNOSIS — K60.2 ANAL FISSURE: Primary | ICD-10-CM

## 2023-04-19 DIAGNOSIS — K59.00 CONSTIPATION, UNSPECIFIED CONSTIPATION TYPE: ICD-10-CM

## 2023-04-19 RX ORDER — LACTULOSE 10 G/15ML
SOLUTION ORAL; RECTAL
COMMUNITY
Start: 2023-03-23

## 2023-04-19 NOTE — PROGRESS NOTES
Godfrey Shah is a 77 y.o. male in for follow up of No diagnosis found.    Pt presents today for follow up for a posterior anal fissure and constipation.   He continues to apply the Diltiazem/Lidocaine compound cream 2-3x daily and experiencing little rectal pain.   Has occasional pain/burning sensation if he passes a large BM, but denies any pain if stool is soft.   Denies any additional episodes of RB since his last office visit.     During his last office visit on 03/22/2023, Pt was started on Lactulose.   Reports improvement in his constipation and is no longer taking the Miralax or SS.     Pt chronically anticoagulated with Warfarin.     /76 (BP Location: Left arm, Patient Position: Sitting, Cuff Size: Adult)   Pulse 67   Wt 102 kg (224 lb 9.6 oz)   SpO2 94%   BMI 33.17 kg/m²   Body mass index is 33.17 kg/m².      PE:  Physical Exam  Constitutional:       General: He is not in acute distress.     Appearance: He is well-developed.   HENT:      Head: Normocephalic and atraumatic.   Abdominal:      General: There is no distension.      Palpations: Abdomen is soft.   Musculoskeletal:         General: Normal range of motion.   Neurological:      Mental Status: He is alert.   Psychiatric:         Thought Content: Thought content normal.         Assessment: No diagnosis found.     Plan:

## 2023-04-19 NOTE — PROGRESS NOTES
Godfrey Shah is a 77 y.o. male in for follow up of Anal fissure    Constipation, unspecified constipation type    Pt presents today for follow up for a posterior anal fissure and constipation.   He continues to apply the Diltiazem/Lidocaine compound cream 2-3x daily and experiencing little rectal pain.   Has occasional pain/burning sensation if he passes a large BM, but denies any pain if stool is soft.   Denies any additional episodes of RB since his last office visit.     During his last office visit on 03/22/2023, Pt was started on Lactulose.   Reports improvement in his constipation and is no longer taking the Miralax or SS.     Pt chronically anticoagulated with Warfarin.     /76 (BP Location: Left arm, Patient Position: Sitting, Cuff Size: Adult)   Pulse 67   Wt 102 kg (224 lb 9.6 oz)   SpO2 94%   BMI 33.17 kg/m²   Body mass index is 33.17 kg/m².      PE:  Physical Exam  Constitutional:       General: He is not in acute distress.     Appearance: He is well-developed.   HENT:      Head: Normocephalic and atraumatic.   Abdominal:      General: There is no distension.      Palpations: Abdomen is soft.   Musculoskeletal:         General: Normal range of motion.   Neurological:      Mental Status: He is alert.   Psychiatric:         Thought Content: Thought content normal.         Assessment:   1. Anal fissure    2. Constipation, unspecified constipation type    - Improved     Plan:  - Revisited treatment options for the anal fissure including continuing conservative management with Diltiazem/Lidocaine compound cream, Chemical sphincterotomy with Botox, and LIAS. Benefits, risks, success rate, and recovery time for each option discussed with Pt. Pt states he would like to go home and consider options. Will call our office if he would like to proceed.   - Discussed repeat colonoscopy. Pt will consider this.   - Continue Lactulose for constipation.   - Continue 30-40 grams of fiber daily  - Follow up as  needed.

## 2023-04-26 RX ORDER — WARFARIN SODIUM 3 MG/1
TABLET ORAL
Qty: 90 TABLET | Refills: 1 | Status: SHIPPED | OUTPATIENT
Start: 2023-04-26

## 2023-05-15 ENCOUNTER — OFFICE VISIT (OUTPATIENT)
Dept: FAMILY MEDICINE CLINIC | Facility: CLINIC | Age: 78
End: 2023-05-15
Payer: MEDICARE

## 2023-05-15 VITALS
WEIGHT: 221.4 LBS | HEART RATE: 60 BPM | OXYGEN SATURATION: 94 % | HEIGHT: 69 IN | BODY MASS INDEX: 32.79 KG/M2 | TEMPERATURE: 96.3 F | SYSTOLIC BLOOD PRESSURE: 132 MMHG | DIASTOLIC BLOOD PRESSURE: 80 MMHG

## 2023-05-15 DIAGNOSIS — E11.59 TYPE 2 DIABETES MELLITUS WITH OTHER CIRCULATORY COMPLICATION, WITHOUT LONG-TERM CURRENT USE OF INSULIN: Primary | ICD-10-CM

## 2023-05-15 DIAGNOSIS — I10 ESSENTIAL HYPERTENSION: ICD-10-CM

## 2023-05-15 DIAGNOSIS — E78.2 MIXED HYPERLIPIDEMIA: ICD-10-CM

## 2023-05-15 PROCEDURE — 99214 OFFICE O/P EST MOD 30 MIN: CPT | Performed by: FAMILY MEDICINE

## 2023-05-15 PROCEDURE — 3075F SYST BP GE 130 - 139MM HG: CPT | Performed by: FAMILY MEDICINE

## 2023-05-15 PROCEDURE — 3079F DIAST BP 80-89 MM HG: CPT | Performed by: FAMILY MEDICINE

## 2023-05-15 RX ORDER — METOPROLOL SUCCINATE 25 MG/1
25 TABLET, EXTENDED RELEASE ORAL DAILY
Qty: 90 TABLET | Refills: 3 | Status: SHIPPED | OUTPATIENT
Start: 2023-05-15

## 2023-05-15 NOTE — PROGRESS NOTES
Chief Complaint   Patient presents with   • Hypertension       Subjective   Godfrey Shah is a 77 y.o. male.     History of Present Illness   F/U HTn.  No orthostasis.  Doing well with meds.    F/U hyperlipidmeia.  No myalgias.    The following portions of the patient's history were reviewed and updated as appropriate: allergies, current medications, past family history, past medical history, past social history, past surgical history and problem list.    Review of Systems   Constitutional: Negative for appetite change and fatigue.   HENT: Negative for nosebleeds and sore throat.    Eyes: Negative for blurred vision and visual disturbance.   Respiratory: Negative for shortness of breath and wheezing.    Cardiovascular: Negative for chest pain and leg swelling.   Gastrointestinal: Negative for abdominal distention and abdominal pain.   Endocrine: Negative for cold intolerance and polyuria.   Genitourinary: Negative for dysuria and hematuria.   Musculoskeletal: Negative for arthralgias and myalgias.   Skin: Negative for color change and rash.   Neurological: Negative for weakness and confusion.   Psychiatric/Behavioral: Negative for agitation and depressed mood.       Patient Active Problem List   Diagnosis   • Bradycardia, sinus   • Sleep-disordered breathing   • Symptomatic bradycardia   • AV block   • Prostate cancer   • Pacemaker   • Insomnia   • RYAN (obstructive sleep apnea)   • Nephrolithiasis   • Hyperglycemia   • GERD (gastroesophageal reflux disease)   • Osteoarthritis   • COPD (chronic obstructive pulmonary disease)   • Inability to attain erection   • Coronary artery disease   • Visit for suture removal   • Medicare annual wellness visit, subsequent   • Mixed hyperlipidemia   • COPD with acute exacerbation   • Essential hypertension   • Paroxysmal atrial fibrillation   • Other age-related cataract   • High risk medication use   • Asymptomatic microscopic hematuria   • Type 2 diabetes mellitus with  circulatory disorder, without long-term current use of insulin   • Other hemorrhoids   • Benign paroxysmal positional vertigo   • First degree heart block       No Known Allergies      Current Outpatient Medications:   •  albuterol sulfate HFA (Ventolin HFA) 108 (90 Base) MCG/ACT inhaler, Inhale 2 puffs Every 4 (Four) Hours As Needed for Wheezing., Disp: 18 g, Rfl: 3  •  aspirin (aspirin) 81 MG EC tablet, Take 1 tablet by mouth Daily., Disp: , Rfl:   •  atorvastatin (LIPITOR) 20 MG tablet, TAKE 1 TABLET BY MOUTH EVERY NIGHT, Disp: 90 tablet, Rfl: 3  •  cetirizine (ZyrTEC) 10 MG tablet, Take 1 tablet by mouth Daily., Disp: , Rfl:   •  lactulose (CHRONULAC) 10 GM/15ML solution solution (encephalopathy), Take 15 mLs by mouth 2 Times a Day for 30 days., Disp: , Rfl:   •  latanoprost (XALATAN) 0.005 % ophthalmic solution, instill 1 drop by ophthalmic route  every day into both eye(s) every day the evening, Disp: , Rfl:   •  losartan-hydrochlorothiazide (Hyzaar) 100-25 MG per tablet, Take 1 tablet by mouth Daily., Disp: 90 tablet, Rfl: 3  •  meclizine (ANTIVERT) 25 MG tablet, TAKE 1 TABLET BY MOUTH THREE TIMES A DAY AS NEEDED FOR DIZZINESS., Disp: 30 tablet, Rfl: 0  •  metoprolol succinate XL (TOPROL-XL) 25 MG 24 hr tablet, Take 1 tablet by mouth Daily., Disp: 90 tablet, Rfl: 3  •  nitroglycerin (NITROSTAT) 0.4 MG SL tablet, Place 1 tablet under the tongue Every 5 (Five) Minutes As Needed for Chest Pain (Chest Pain With Systolic Blood Pressure Greater Than 100)., Disp: 30 tablet, Rfl: 12  •  pantoprazole (PROTONIX) 40 MG EC tablet, TAKE 1 TABLET BY MOUTH EVERY DAY, Disp: 90 tablet, Rfl: 2  •  QUEtiapine (SEROquel) 100 MG tablet, TAKE 1 TABLET BY MOUTH EVERY DAY AT NIGHT, Disp: 90 tablet, Rfl: 1  •  warfarin (Jantoven) 3 MG tablet, TAKE 1 TABLET BY MOUTH EVERY DAY, Disp: 90 tablet, Rfl: 1    Past Medical History:   Diagnosis Date   • Abnormal CXR 09/2016   • Alcohol use 08/18/2016    SEEN AT [facility] ER   • Allergic rhinitis     • Anxiety    • Arm mass, right    • ASCVD (arteriosclerotic cardiovascular disease)    • Asthma     MILD, INTERMITTENT   • AV block, complete    • BCC (basal cell carcinoma) 10/2020    OF TRUNK, FOLLOWED BY DR. GUZMAN RIVERS   • Biceps muscle tear 03/01/2017   • BPH (benign prostatic hyperplasia)     FOLLOWED BY DR. BENNY OVALLE   • Bradycardia 01/2022   • CAD (coronary artery disease)    • Cardiac pacemaker in situ    • Cataract     BILATERAL   • Colon polyps     FOLLOWED BY DR. ELLIE KUMAR   • COPD (chronic obstructive pulmonary disease)     FOLLOWED BY DR. ALAN COLE   • Cyst of kidney, acquired    • Dacryoadenitis of both lacrimal glands     FOLLOWED BY DR. JADA NEW   • Depression    • Diabetes mellitus     TYPE 2, NIDDM, CURRENTLY NO MEDS   • Diplopia    • Eczema    • ED (erectile dysfunction)    • Gastroduodenitis 11/2017   • GERD (gastroesophageal reflux disease)    • Glaucoma     BILATERAL   • Hematuria    • Hemorrhoids    • Hyperglycemia    • Hyperlipidemia     MIXED HLD   • Hypertension    • Inability to attain erection    • Insect bite of right upper arm 09/07/2017   • Insomnia    • Kidney stones    • Laceration of foot 10/16/2019    LEFT FOOT, SEEN AT [facility] ER   • Left kidney mass     BEING FOLLOWED BY DR. 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/2016   • Pleural effusion 06/2019   • Pneumothorax 04/2019   • Prostate cancer 05/2017   • Pulmonary infiltrate 06/2018   • Rectal bleeding 12/2022    WITH RECTAL PAIN   • Regular astigmatism of left eye    • Rotator cuff tendonitis, right    • Seborrheic keratosis    • Snoring    • SSS (sick sinus syndrome)    • Syncope and collapse 09/2020   • Tachycardia 07/2022   • 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    • Urinary incontinence 2022   • Vertigo 2014    SEEN AT [facility] ER       Past Surgical History:   Procedure Laterality Date   • CARDIAC CATHETERIZATION N/A 2019    Procedure: LEFT HEART CATH;  Surgeon: Lalo Naranjo MD;  Location:  [facility];  Service: Cardiovascular   • CARDIAC CATHETERIZATION N/A 2019    Procedure: CORONARY ANGIOGRAPHY;  Surgeon: Lalo Naranjo MD;  Location:  [facility];  Service: Cardiovascular   • CARDIAC CATHETERIZATION N/A 2019    Procedure: Left ventriculography;  Surgeon: Lalo Naranjo MD;  Location:  [facility];  Service: Cardiovascular   • CARDIAC ELECTROPHYSIOLOGY PROCEDURE Left 2019    Procedure: Pacemaker DC new;  Surgeon: Curtis Moore DO;  Location:  [facility];  Service: Cardiovascular   • COLONOSCOPY N/A 2017   • COLONOSCOPY N/A     WNL   • COLONOSCOPY W/ POLYPECTOMY N/A 2011    POLYPS, RESCOPE IN 5 YRS   • CORONARY ANGIOPLASTY WITH STENT PLACEMENT     • CYST REMOVAL     • CYSTOSCOPY RETROGRADE PYELOGRAM N/A 2014    DR. YOHANA REIS AT [facility]   • ENDOSCOPY N/A 2003    DUODENAL POLYPS X3, PATH: MILD CHRONIC DUODENITIS, DR. KRISHNA CHÁVEZ AT [facility]   • LACERATION REPAIR Left 10/16/2019    LEFT FOOT, DONE AT [facility] ER   • PENILE PROSTHESIS IMPLANT N/A    • PROSTATE BIOPSY Bilateral 2015    NEEDLE BIOPSY, WNL   • VASECTOMY N/A        Family History   Problem Relation Age of Onset   • No Known Problems Mother    • Heart disease Father    • Coronary artery disease Father    • Heart disease Sister    • Depression Sister    • Depression Brother    • Heart disease Brother        Social History     Tobacco Use   • Smoking status: Former     Packs/day: 1.00     Years: 47.00     Pack years: 47.00     Types: Cigarettes     Quit date: 10/1999     Years since quittin.6     Passive exposure: Past   • Smokeless tobacco: Never   • Tobacco comments:      Quit 2000   Substance Use Topics   • Alcohol use: No            Objective     Vitals:    05/15/23 0904   BP: 132/80   Pulse: 60   Temp: 96.3 °F (35.7 °C)   SpO2: 94%     Body mass index is 32.68 kg/m².    Physical Exam  Vitals reviewed.   Constitutional:       Appearance: He is well-developed. He is not diaphoretic.   HENT:      Head: Normocephalic and atraumatic.   Eyes:      General: No scleral icterus.     Pupils: Pupils are equal, round, and reactive to light.   Neck:      Thyroid: No thyromegaly.   Cardiovascular:      Rate and Rhythm: Normal rate and regular rhythm.      Heart sounds: No murmur heard.    No friction rub. No gallop.   Pulmonary:      Effort: Pulmonary effort is normal. No respiratory distress.      Breath sounds: No wheezing or rales.   Chest:      Chest wall: No tenderness.   Abdominal:      General: Bowel sounds are normal. There is no distension.      Palpations: Abdomen is soft.      Tenderness: There is no abdominal tenderness.   Musculoskeletal:         General: No deformity. Normal range of motion.   Lymphadenopathy:      Cervical: No cervical adenopathy.   Skin:     General: Skin is warm and dry.      Findings: No rash.   Neurological:      Cranial Nerves: No cranial nerve deficit.      Motor: No abnormal muscle tone.         Lab Results   Component Value Date    GLUCOSE 114 (H) 11/16/2022    BUN 9 11/16/2022    CREATININE 1.11 11/16/2022    EGFRIFNONA 61 11/11/2021    EGFRIFAFRI 71 11/11/2021    BCR 8.1 11/16/2022    K 3.7 11/16/2022    CO2 28.1 11/16/2022    CALCIUM 8.8 11/16/2022    PROTENTOTREF 6.6 11/16/2022    ALBUMIN 4.30 11/16/2022    LABIL2 1.9 11/16/2022    AST 21 11/16/2022    ALT 12 11/16/2022       WBC   Date Value Ref Range Status   11/16/2022 5.18 3.40 - 10.80 10*3/mm3 Final     RBC   Date Value Ref Range Status   11/16/2022 4.76 4.14 - 5.80 10*6/mm3 Final     Hemoglobin   Date Value Ref Range Status   11/16/2022 15.2 13.0 - 17.7 g/dL Final   07/21/2019 15.2 13.0 - 17.7  g/dL Final     Hematocrit   Date Value Ref Range Status   11/16/2022 45.4 37.5 - 51.0 % Final   07/21/2019 45.8 37.5 - 51.0 % Final     MCV   Date Value Ref Range Status   11/16/2022 95.4 79.0 - 97.0 fL Final   07/21/2019 97.2 (H) 79.0 - 97.0 fL Final     MCH   Date Value Ref Range Status   11/16/2022 31.9 26.6 - 33.0 pg Final   07/21/2019 32.3 26.6 - 33.0 pg Final     MCHC   Date Value Ref Range Status   11/16/2022 33.5 31.5 - 35.7 g/dL Final   07/21/2019 33.2 31.5 - 35.7 g/dL Final     RDW   Date Value Ref Range Status   11/16/2022 12.9 12.3 - 15.4 % Final   07/21/2019 12.4 12.3 - 15.4 % Final     RDW-SD   Date Value Ref Range Status   07/21/2019 44.6 37.0 - 54.0 fl Final     MPV   Date Value Ref Range Status   07/21/2019 10.0 6.0 - 12.0 fL Final     Platelets   Date Value Ref Range Status   11/16/2022 145 140 - 450 10*3/mm3 Final   07/21/2019 115 (L) 140 - 450 10*3/mm3 Final     Neutrophil Rel %   Date Value Ref Range Status   11/16/2022 36.5 (L) 42.7 - 76.0 % Final     Neutrophil %   Date Value Ref Range Status   07/21/2019 60.2 42.7 - 76.0 % Final     Lymphocyte Rel %   Date Value Ref Range Status   11/16/2022 49.2 (H) 19.6 - 45.3 % Final     Lymphocyte %   Date Value Ref Range Status   07/21/2019 32.2 19.6 - 45.3 % Final     Monocyte Rel %   Date Value Ref Range Status   11/16/2022 9.1 5.0 - 12.0 % Final     Monocyte %   Date Value Ref Range Status   07/21/2019 6.4 5.0 - 12.0 % Final     Eosinophil Rel %   Date Value Ref Range Status   11/16/2022 4.4 0.3 - 6.2 % Final     Eosinophil %   Date Value Ref Range Status   07/21/2019 0.8 0.3 - 6.2 % Final     Basophil Rel %   Date Value Ref Range Status   11/16/2022 0.6 0.0 - 1.5 % Final     Basophil %   Date Value Ref Range Status   07/21/2019 0.2 0.0 - 1.5 % Final     Immature Grans %   Date Value Ref Range Status   08/18/2016 0.0 0.0 - 0.5 % Final     Neutrophils Absolute   Date Value Ref Range Status   11/16/2022 1.89 1.70 - 7.00 10*3/mm3 Final     Neutrophils,  Absolute   Date Value Ref Range Status   07/21/2019 2.84 1.70 - 7.00 10*3/mm3 Final     Lymphocytes Absolute   Date Value Ref Range Status   11/16/2022 2.55 0.70 - 3.10 10*3/mm3 Final     Lymphocytes, Absolute   Date Value Ref Range Status   07/21/2019 1.52 0.70 - 3.10 10*3/mm3 Final     Monocytes Absolute   Date Value Ref Range Status   11/16/2022 0.47 0.10 - 0.90 10*3/mm3 Final     Monocytes, Absolute   Date Value Ref Range Status   07/21/2019 0.30 0.10 - 0.90 10*3/mm3 Final     Eosinophils Absolute   Date Value Ref Range Status   11/16/2022 0.23 0.00 - 0.40 10*3/mm3 Final     Eosinophils, Absolute   Date Value Ref Range Status   07/21/2019 0.04 0.00 - 0.40 10*3/mm3 Final     Basophils Absolute   Date Value Ref Range Status   11/16/2022 0.03 0.00 - 0.20 10*3/mm3 Final     Basophils, Absolute   Date Value Ref Range Status   07/21/2019 0.01 0.00 - 0.20 10*3/mm3 Final     Immature Grans, Absolute   Date Value Ref Range Status   08/18/2016 0.00 0.00 - 0.03 10*3/mm3 Final     nRBC   Date Value Ref Range Status   11/16/2022 0.0 0.0 - 0.2 /100 WBC Final       Lab Results   Component Value Date    HGBA1C 6.20 (H) 11/16/2022       Lab Results   Component Value Date    AGIQBTHC85 239 11/11/2021       TSH   Date Value Ref Range Status   04/21/2022 1.360 0.450 - 4.500 uIU/mL Final   04/15/2019 1.030 0.270 - 4.200 mIU/mL Final       Lab Results   Component Value Date    CHOL 149 04/18/2019     Lab Results   Component Value Date    TRIG 131 11/16/2022     Lab Results   Component Value Date    HDL 36 (L) 11/16/2022     Lab Results   Component Value Date    LDL 77 11/16/2022     Lab Results   Component Value Date    VLDL 23 11/16/2022     Lab Results   Component Value Date    LDLHDL 2.84 04/18/2019         Procedures    Assessment & Plan   Problems Addressed this Visit     Essential hypertension    Relevant Medications    metoprolol succinate XL (TOPROL-XL) 25 MG 24 hr tablet    Other Relevant Orders    Comprehensive Metabolic  Panel    Mixed hyperlipidemia    Relevant Orders    Comprehensive Metabolic Panel    Type 2 diabetes mellitus with circulatory disorder, without long-term current use of insulin - Primary    Relevant Orders    Hemoglobin A1c    Microalbumin / Creatinine Urine Ratio - Urine, Clean Catch   Diagnoses       Codes Comments    Type 2 diabetes mellitus with other circulatory complication, without long-term current use of insulin    -  Primary ICD-10-CM: E11.59  ICD-9-CM: 250.70     Mixed hyperlipidemia     ICD-10-CM: E78.2  ICD-9-CM: 272.2     Essential hypertension     ICD-10-CM: I10  ICD-9-CM: 401.9         HTN.  Controlled.  RF metoprolol.  Check CMP.  Continue losartan/hct  Hyperlipidmeia.  LDL 77 from 11/22.  Continue atorvastatin  DM2.  Check A1c, urine micro.    Orders Placed This Encounter   Procedures   • Comprehensive Metabolic Panel     Order Specific Question:   Release to patient     Answer:   Routine Release   • Hemoglobin A1c     Order Specific Question:   Release to patient     Answer:   Routine Release   • Microalbumin / Creatinine Urine Ratio - Urine, Clean Catch     Order Specific Question:   Release to patient     Answer:   Routine Release       Current Outpatient Medications   Medication Sig Dispense Refill   • albuterol sulfate HFA (Ventolin HFA) 108 (90 Base) MCG/ACT inhaler Inhale 2 puffs Every 4 (Four) Hours As Needed for Wheezing. 18 g 3   • aspirin (aspirin) 81 MG EC tablet Take 1 tablet by mouth Daily.     • atorvastatin (LIPITOR) 20 MG tablet TAKE 1 TABLET BY MOUTH EVERY NIGHT 90 tablet 3   • cetirizine (ZyrTEC) 10 MG tablet Take 1 tablet by mouth Daily.     • lactulose (CHRONULAC) 10 GM/15ML solution solution (encephalopathy) Take 15 mLs by mouth 2 Times a Day for 30 days.     • latanoprost (XALATAN) 0.005 % ophthalmic solution instill 1 drop by ophthalmic route  every day into both eye(s) every day the evening     • losartan-hydrochlorothiazide (Hyzaar) 100-25 MG per tablet Take 1 tablet by  mouth Daily. 90 tablet 3   • meclizine (ANTIVERT) 25 MG tablet TAKE 1 TABLET BY MOUTH THREE TIMES A DAY AS NEEDED FOR DIZZINESS. 30 tablet 0   • metoprolol succinate XL (TOPROL-XL) 25 MG 24 hr tablet Take 1 tablet by mouth Daily. 90 tablet 3   • nitroglycerin (NITROSTAT) 0.4 MG SL tablet Place 1 tablet under the tongue Every 5 (Five) Minutes As Needed for Chest Pain (Chest Pain With Systolic Blood Pressure Greater Than 100). 30 tablet 12   • pantoprazole (PROTONIX) 40 MG EC tablet TAKE 1 TABLET BY MOUTH EVERY DAY 90 tablet 2   • QUEtiapine (SEROquel) 100 MG tablet TAKE 1 TABLET BY MOUTH EVERY DAY AT NIGHT 90 tablet 1   • warfarin (Jantoven) 3 MG tablet TAKE 1 TABLET BY MOUTH EVERY DAY 90 tablet 1     No current facility-administered medications for this visit.       Godfrey Shah had no medications administered during this visit.    Return in about 6 months (around 11/15/2023) for Medicare wellness and OV, 30 minutes.    There are no Patient Instructions on file for this visit.

## 2023-05-16 LAB
ALBUMIN SERPL-MCNC: 4.3 G/DL (ref 3.5–5.2)
ALBUMIN/CREAT UR: 10 MG/G CREAT (ref 0–29)
ALBUMIN/GLOB SERPL: 1.8 G/DL
ALP SERPL-CCNC: 76 U/L (ref 39–117)
ALT SERPL-CCNC: 16 U/L (ref 1–41)
AST SERPL-CCNC: 22 U/L (ref 1–40)
BILIRUB SERPL-MCNC: 1.1 MG/DL (ref 0–1.2)
BUN SERPL-MCNC: 15 MG/DL (ref 8–23)
BUN/CREAT SERPL: 16.3 (ref 7–25)
CALCIUM SERPL-MCNC: 9.2 MG/DL (ref 8.6–10.5)
CHLORIDE SERPL-SCNC: 104 MMOL/L (ref 98–107)
CO2 SERPL-SCNC: 27.3 MMOL/L (ref 22–29)
CREAT SERPL-MCNC: 0.92 MG/DL (ref 0.76–1.27)
CREAT UR-MCNC: 152.9 MG/DL
EGFRCR SERPLBLD CKD-EPI 2021: 85.7 ML/MIN/1.73
GLOBULIN SER CALC-MCNC: 2.4 GM/DL
GLUCOSE SERPL-MCNC: 109 MG/DL (ref 65–99)
HBA1C MFR BLD: 6.1 % (ref 4.8–5.6)
MICROALBUMIN UR-MCNC: 14.8 UG/ML
POTASSIUM SERPL-SCNC: 3.8 MMOL/L (ref 3.5–5.2)
PROT SERPL-MCNC: 6.7 G/DL (ref 6–8.5)
SODIUM SERPL-SCNC: 142 MMOL/L (ref 136–145)

## 2023-07-26 ENCOUNTER — OFFICE VISIT (OUTPATIENT)
Dept: SURGERY | Facility: CLINIC | Age: 78
End: 2023-07-26
Payer: MEDICARE

## 2023-07-26 VITALS
HEIGHT: 69 IN | HEART RATE: 68 BPM | BODY MASS INDEX: 31.58 KG/M2 | WEIGHT: 213.2 LBS | OXYGEN SATURATION: 94 % | SYSTOLIC BLOOD PRESSURE: 122 MMHG | DIASTOLIC BLOOD PRESSURE: 82 MMHG | TEMPERATURE: 98.1 F

## 2023-07-26 DIAGNOSIS — K62.89 RECTAL PAIN: Primary | ICD-10-CM

## 2023-07-26 DIAGNOSIS — Z86.010 PERSONAL HISTORY OF COLONIC POLYPS: ICD-10-CM

## 2023-07-26 DIAGNOSIS — K60.2 ANAL FISSURE: ICD-10-CM

## 2023-07-26 PROBLEM — Z86.0100 PERSONAL HISTORY OF COLONIC POLYPS: Status: ACTIVE | Noted: 2023-07-26

## 2023-07-26 RX ORDER — SODIUM CHLORIDE, SODIUM LACTATE, POTASSIUM CHLORIDE, CALCIUM CHLORIDE 600; 310; 30; 20 MG/100ML; MG/100ML; MG/100ML; MG/100ML
30 INJECTION, SOLUTION INTRAVENOUS CONTINUOUS
OUTPATIENT
Start: 2023-07-26

## 2023-07-26 NOTE — PROGRESS NOTES
"Godfrey Shah is a 78 y.o. male in for follow up of Rectal pain    Anal fissure    Personal history of colonic polyps    Pt presents today for a follow up for a chronic anal fissure.  Continues to experience intermittent anal pain with BMs despite Diltiazem/Lidocaine compound cream.   No pain if BMs are soft, but will have pain if they are more formed.  Taking Lactulose and fiber inconsistently and more on a PRN basis vs taking them regularly.    Pt believes pain is due to a hemorrhoid as he will experience intermittent swelling after BMs.    /82 (BP Location: Left arm, Patient Position: Sitting, Cuff Size: Adult)   Pulse 68   Temp 98.1 °F (36.7 °C) (Oral)   Ht 175.3 cm (69.02\")   Wt 96.7 kg (213 lb 3.2 oz)   SpO2 94%   BMI 31.47 kg/m²   Body mass index is 31.47 kg/m².      PE:  Physical Exam  Exam conducted with a chaperone present.   Constitutional:       General: He is not in acute distress.     Appearance: He is well-developed.   HENT:      Head: Normocephalic and atraumatic.   Abdominal:      General: There is no distension.      Palpations: Abdomen is soft.   Genitourinary:     Comments: Perianal exam: Posterior anal fissure present. No enlargement of external hemorrhoids. Tenderness to palpation along area of the anal fissure.  Musculoskeletal:         General: Normal range of motion.   Neurological:      Mental Status: He is alert.   Psychiatric:         Thought Content: Thought content normal.       Assessment:   1. Rectal pain    2. Anal fissure    3. Personal history of colonic polyps         Plan:  - Will schedule a colonoscopy with chemical sphincterotomy with Botox  - Will get cardiac clearance from his cardiologist. Will need to hold Warfarin x5 days prior to procedure.   - Continue Lactulose and fiber.  - Follow up after colonoscopy with sphincterotomy    "

## 2023-08-24 RX ORDER — ATORVASTATIN CALCIUM 40 MG/1
TABLET, FILM COATED ORAL
Qty: 30 TABLET | Refills: 0 | Status: SHIPPED | OUTPATIENT
Start: 2023-08-24 | End: 2023-08-27

## 2023-08-26 DIAGNOSIS — I25.10 CORONARY ARTERY DISEASE INVOLVING NATIVE CORONARY ARTERY OF NATIVE HEART WITHOUT ANGINA PECTORIS: ICD-10-CM

## 2023-08-27 RX ORDER — ATORVASTATIN CALCIUM 20 MG/1
20 TABLET, FILM COATED ORAL NIGHTLY
Qty: 90 TABLET | Refills: 3 | Status: SHIPPED | OUTPATIENT
Start: 2023-08-27

## 2023-08-31 RX ORDER — LACTULOSE 10 G/15ML
SOLUTION ORAL
Qty: 900 ML | Refills: 0 | Status: SHIPPED | OUTPATIENT
Start: 2023-08-31

## 2023-09-20 ENCOUNTER — TELEPHONE (OUTPATIENT)
Dept: SURGERY | Facility: CLINIC | Age: 78
End: 2023-09-20
Payer: MEDICARE

## 2023-09-20 NOTE — TELEPHONE ENCOUNTER
PT SCHEDULED FOR CY W BOTOX INJECTION. PT WANTED TO KNOW IF HE WILL BE ON CLEAR LIQUID DIET DAY OF CY, ADVISED NPO AFTER MIDNIGHT. PT  ASKED IF HE WOULD BE OK TO GO BOWLING ON FRIDAY AFTER HAVING THE BOTOX INJECTION AND CY ON THURSDAY. PER PAWEL CORTEZ THIS IS OK AS LONG AS HE FEELS UP TO IT. ADVISED PT, PT VOICED UNDERSTANDING.

## 2023-09-21 ENCOUNTER — ANESTHESIA (OUTPATIENT)
Dept: GASTROENTEROLOGY | Facility: HOSPITAL | Age: 78
End: 2023-09-21
Payer: MEDICARE

## 2023-09-21 ENCOUNTER — ANESTHESIA EVENT (OUTPATIENT)
Dept: GASTROENTEROLOGY | Facility: HOSPITAL | Age: 78
End: 2023-09-21
Payer: MEDICARE

## 2023-09-21 ENCOUNTER — HOSPITAL ENCOUNTER (OUTPATIENT)
Facility: HOSPITAL | Age: 78
Setting detail: HOSPITAL OUTPATIENT SURGERY
Discharge: HOME | End: 2023-09-21
Attending: COLON & RECTAL SURGERY | Admitting: COLON & RECTAL SURGERY

## 2023-09-21 VITALS
BODY MASS INDEX: 31.22 KG/M2 | RESPIRATION RATE: 16 BRPM | SYSTOLIC BLOOD PRESSURE: 146 MMHG | HEART RATE: 60 BPM | DIASTOLIC BLOOD PRESSURE: 84 MMHG | HEIGHT: 68 IN | WEIGHT: 206 LBS | OXYGEN SATURATION: 94 %

## 2023-09-21 DIAGNOSIS — K62.89 RECTAL PAIN: ICD-10-CM

## 2023-09-21 DIAGNOSIS — Z86.010 PERSONAL HISTORY OF COLONIC POLYPS: ICD-10-CM

## 2023-09-21 DIAGNOSIS — K60.2 ANAL FISSURE: ICD-10-CM

## 2023-09-21 PROCEDURE — 25010000002 PROPOFOL 10 MG/ML EMULSION: Performed by: ANESTHESIOLOGY

## 2023-09-21 PROCEDURE — 25010000002 ONABOTULINUMTOXINA 100 UNITS RECONSTITUTED SOLUTION: Performed by: COLON & RECTAL SURGERY

## 2023-09-21 RX ORDER — PROPOFOL 10 MG/ML
VIAL (ML) INTRAVENOUS CONTINUOUS PRN
Status: DISCONTINUED | OUTPATIENT
Start: 2023-09-21 | End: 2023-09-21 | Stop reason: SURG

## 2023-09-21 RX ORDER — SODIUM CHLORIDE, SODIUM LACTATE, POTASSIUM CHLORIDE, CALCIUM CHLORIDE 600; 310; 30; 20 MG/100ML; MG/100ML; MG/100ML; MG/100ML
30 INJECTION, SOLUTION INTRAVENOUS CONTINUOUS
Status: DISCONTINUED | OUTPATIENT
Start: 2023-09-21 | End: 2023-09-21 | Stop reason: HOSPADM

## 2023-09-21 RX ORDER — PROPOFOL 10 MG/ML
VIAL (ML) INTRAVENOUS AS NEEDED
Status: DISCONTINUED | OUTPATIENT
Start: 2023-09-21 | End: 2023-09-21 | Stop reason: SURG

## 2023-09-21 RX ORDER — LIDOCAINE HYDROCHLORIDE 20 MG/ML
INJECTION, SOLUTION INFILTRATION; PERINEURAL AS NEEDED
Status: DISCONTINUED | OUTPATIENT
Start: 2023-09-21 | End: 2023-09-21 | Stop reason: SURG

## 2023-09-21 RX ADMIN — LIDOCAINE HYDROCHLORIDE 60 MG: 20 INJECTION, SOLUTION INFILTRATION; PERINEURAL at 15:53

## 2023-09-21 RX ADMIN — Medication 100 MG: at 15:55

## 2023-09-21 RX ADMIN — PROPOFOL 200 MCG/KG/MIN: 10 INJECTION, EMULSION INTRAVENOUS at 15:55

## 2023-09-21 RX ADMIN — SODIUM CHLORIDE, POTASSIUM CHLORIDE, SODIUM LACTATE AND CALCIUM CHLORIDE 30 ML/HR: 600; 310; 30; 20 INJECTION, SOLUTION INTRAVENOUS at 15:19

## 2023-09-21 NOTE — ANESTHESIA PREPROCEDURE EVALUATION
Anesthesia Evaluation     Patient summary reviewed and Nursing notes reviewed   NPO Solid Status: > 8 hours             Airway   Mallampati: II  TM distance: >3 FB  Neck ROM: full  no difficulty expected  Dental - normal exam     Pulmonary - normal exam   (+) COPD, asthma,sleep apnea  Cardiovascular - normal exam    (+) pacemaker, hypertension, CAD, cardiac stents , dysrhythmias Atrial Fib      Neuro/Psych- negative ROS  GI/Hepatic/Renal/Endo    (+) GERD, renal disease, diabetes mellitus    Musculoskeletal (-) negative ROS    Abdominal    Substance History - negative use     OB/GYN negative ob/gyn ROS         Other                      Anesthesia Plan    ASA 4     MAC       Anesthetic plan, risks, benefits, and alternatives have been provided, discussed and informed consent has been obtained with: patient.    CODE STATUS:

## 2023-09-21 NOTE — DISCHARGE INSTRUCTIONS
For the next 24 hours patient needs to be with a responsible adult.    For 24 hours DO NOT drive, operate machinery, appliances, drink alcohol, make important decisions or sign legal documents.    Start with a light or bland diet if you are feeling sick to your stomach otherwise advance to regular diet as tolerated.    Follow recommendations on procedure report if provided by your doctor.    Call Dr. Melton for problems [phone]    Problems may include but not limited to: large amounts of bleeding, trouble breathing, repeated vomiting, severe unrelieved pain, fever or chills.

## 2023-09-21 NOTE — H&P
Guzman Shah is a 78 y.o. male  who is referred by Kathi Melton MD for a colonoscopy. He   has an indications: previous adenomatous polyp and rectal pain.     He denies any change in bowel function, melena, or hematochezia.    Past Medical History:   Diagnosis Date    Abnormal CXR 09/2016    Alcohol use 08/18/2016    SEEN AT [facility]    Allergic rhinitis     Anal fissure 07/26/2023    Anticoagulant long-term use     Anxiety     Arm mass, right     ASCVD (arteriosclerotic cardiovascular disease)     Asthma     MILD, INTERMITTENT    AV block, complete     BCC (basal cell carcinoma) 10/2020    OF TRUNK, FOLLOWED BY DR. GUZMAN RIVERS    Biceps muscle tear 03/01/2017    BPH (benign prostatic hyperplasia)     FOLLOWED BY DR. BENNY OVALLE    Bradycardia 01/2022    CAD (coronary artery disease)     Cardiac pacemaker in situ     Cataract     BILATERAL    Colon polyps     FOLLOWED BY DR. ELLIE KUMAR    COPD (chronic obstructive pulmonary disease)     FOLLOWED BY DR. ALAN COLE    Cyst of kidney, acquired     Dacryoadenitis of both lacrimal glands     FOLLOWED BY DR. JADA NEW    Depression     Diplopia     Eczema     ED (erectile dysfunction)     Gastroduodenitis 11/2017    GERD (gastroesophageal reflux disease)     Glaucoma     BILATERAL    Hematuria     Hemorrhoids     Hyperglycemia     Hyperlipidemia     MIXED HLD    Hypertension     Inability to attain erection     Insect bite of right upper arm 09/07/2017    Insomnia     Kidney stones     Laceration of foot 10/16/2019    LEFT FOOT, SEEN AT [facility]    Left kidney mass     BEING FOLLOWED BY DR. 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    Pleural effusion 06/2019     Pneumothorax 04/2019    Prostate cancer 05/2017    Pulmonary infiltrate 06/2018    Rectal bleeding 12/2022    WITH RECTAL PAIN    Regular astigmatism of left eye     Rotator cuff tendonitis, right     Seborrheic keratosis     Snoring     SSS (sick sinus syndrome)     Syncope and collapse 09/2020    Tachycardia 07/2022    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    Urinary incontinence 01/2022    Vertigo 05/02/2014    SEEN AT [place] [facility]       Past Surgical History:   Procedure Laterality Date    CARDIAC CATHETERIZATION N/A 04/17/2019    Procedure: LEFT HEART CATH;  Surgeon: Lalo Naranjo MD;  Location:  [facility];  Service: Cardiovascular    CARDIAC CATHETERIZATION N/A 04/17/2019    Procedure: CORONARY ANGIOGRAPHY;  Surgeon: Lalo Naranjo MD;  Location:  [facility];  Service: Cardiovascular    CARDIAC CATHETERIZATION N/A 04/17/2019    Procedure: Left ventriculography;  Surgeon: Lalo Naranjo MD;  Location:  [facility];  Service: Cardiovascular    CARDIAC ELECTROPHYSIOLOGY PROCEDURE Left 04/18/2019    Procedure: Pacemaker DC new;  Surgeon: Curtis Moore DO;  Location:  [facility];  Service: Cardiovascular    COLONOSCOPY N/A 04/2017    COLONOSCOPY N/A 2014    WNL    COLONOSCOPY W/ POLYPECTOMY N/A 03/2011    POLYPS, RESCOPE IN 5 YRS    CORONARY ANGIOPLASTY WITH STENT PLACEMENT      CYST REMOVAL      CYSTOSCOPY RETROGRADE PYELOGRAM N/A 05/28/2014    DR. YOHANA REIS AT [place]    ENDOSCOPY N/A 12/04/2003    DUODENAL POLYPS X3, PATH: MILD CHRONIC DUODENITIS, DR. KRISHNA CHÁVEZ AT [facility]    LACERATION REPAIR Left 10/16/2019    LEFT FOOT, DONE AT [facility]    PENILE PROSTHESIS IMPLANT N/A     PROSTATE BIOPSY Bilateral 01/2015    NEEDLE BIOPSY, WNL    VASECTOMY N/A     REVERSED- 1977?       Medications Prior to Admission   Medication Sig Dispense Refill Last Dose    albuterol sulfate HFA (Ventolin HFA) 108 (90 Base) MCG/ACT inhaler Inhale 2 puffs Every  4 (Four) Hours As Needed for Wheezing. 18 g 3     aspirin (aspirin) 81 MG EC tablet Take 1 tablet by mouth Daily.       atorvastatin (LIPITOR) 20 MG tablet TAKE 1 TABLET BY MOUTH EVERY NIGHT 90 tablet 3     cetirizine (ZyrTEC) 10 MG tablet Take 1 tablet by mouth Daily.       latanoprost (XALATAN) 0.005 % ophthalmic solution instill 1 drop by ophthalmic route  every day into both eye(s) every day the evening       losartan-hydrochlorothiazide (Hyzaar) 100-25 MG per tablet Take 1 tablet by mouth Daily. 90 tablet 3     meclizine (ANTIVERT) 25 MG tablet TAKE 1 TABLET BY MOUTH THREE TIMES A DAY AS NEEDED FOR DIZZINESS. 30 tablet 0     metoprolol succinate XL (TOPROL-XL) 25 MG 24 hr tablet Take 1 tablet by mouth Daily. 90 tablet 3     nitroglycerin (NITROSTAT) 0.4 MG SL tablet Place 1 tablet under the tongue Every 5 (Five) Minutes As Needed for Chest Pain (Chest Pain With Systolic Blood Pressure Greater Than 100). 30 tablet 12     pantoprazole (PROTONIX) 40 MG EC tablet TAKE 1 TABLET BY MOUTH EVERY DAY 90 tablet 2     QUEtiapine (SEROquel) 100 MG tablet TAKE 1 TABLET BY MOUTH EVERY DAY AT NIGHT 90 tablet 1     warfarin (Jantoven) 3 MG tablet TAKE 1 TABLET BY MOUTH EVERY DAY (Patient taking differently: Take  by mouth Every Night. HOLD X 5 DAYS FOR ENDO) 90 tablet 1     lactulose (CHRONULAC) 10 GM/15ML solution TAKE 15 MLS BY MOUTH 2 TIMES A  mL 0        No Known Allergies    Family History   Problem Relation Age of Onset    No Known Problems Mother     Heart disease Father     Coronary artery disease Father     Heart disease Sister     Depression Sister     Depression Brother     Heart disease Brother     Malig Hyperthermia Neg Hx        Social History     Socioeconomic History    Marital status:    Tobacco Use    Smoking status: Former     Packs/day: 1.00     Years: 47.00     Pack years: 47.00     Types: Cigarettes     Quit date: 10/1999     Years since quittin.9     Passive exposure: Past    Smokeless  tobacco: Never    Tobacco comments:     Quit 2000   Vaping Use    Vaping Use: Never used   Substance and Sexual Activity    Alcohol use: No    Drug use: Never    Sexual activity: Defer       Review of Systems   Gastrointestinal:  Negative for abdominal pain, nausea and vomiting.   All other systems reviewed and are negative.    Vitals:    09/21/23 1501   BP: 143/72   Pulse: 60   Resp: 20   SpO2: 92%         Physical Exam  Constitutional:       Appearance: He is well-developed.   HENT:      Head: Normocephalic and atraumatic.   Eyes:      Pupils: Pupils are equal, round, and reactive to light.   Cardiovascular:      Rate and Rhythm: Regular rhythm.   Pulmonary:      Effort: Pulmonary effort is normal.   Abdominal:      General: There is no distension.      Palpations: Abdomen is soft.   Musculoskeletal:         General: Normal range of motion.   Skin:     General: Skin is warm and dry.   Neurological:      Mental Status: He is alert and oriented to person, place, and time.   Psychiatric:         Thought Content: Thought content normal.         Judgment: Judgment normal.         Assessment & Plan      indications: previous adenomatous polyp and rectal pain        I recommend colonoscopy possible chemical sphincterotomy. I described risks, benefits of the procedure with the patient including but not limited to bleeding, infection, possibility of perforation and possible polypectomy. All of the patient's questions were answered and they would like to proceed with the above recommendations.

## 2023-09-22 NOTE — ANESTHESIA POSTPROCEDURE EVALUATION
Patient: Godfrey Shah    Procedure Summary       Date: 09/21/23 Room / Location:  [facility]    Anesthesia Start: 1551 Anesthesia Stop: 1640    Procedure: COLONOSCOPY INTO CECUM WITH CHEMICAL SPHINCTEROTOMY WITH BOTOX Diagnosis:       Anal fissure      Rectal pain      Personal history of colonic polyps      (Anal fissure [K60.2])      (Rectal pain [K62.89])      (Personal history of colonic polyps [Z86.010])    Surgeons: Kathi Melton MD Provider: Jed Taylor MD    Anesthesia Type: MAC ASA Status: 4            Anesthesia Type: MAC    Vitals  Vitals Value Taken Time   /84 09/21/23 1658   Temp     Pulse 60 09/21/23 1658   Resp 16 09/21/23 1658   SpO2 94 % 09/21/23 1658           Post Anesthesia Care and Evaluation      Comments: No anesthetic comp

## 2023-09-25 ENCOUNTER — TELEPHONE (OUTPATIENT)
Dept: FAMILY MEDICINE CLINIC | Facility: CLINIC | Age: 78
End: 2023-09-25

## 2023-09-25 NOTE — TELEPHONE ENCOUNTER
Caller: Godfrey Shah    Relationship: Self    Best call back number: [phone]    What is the best time to reach you: ANY    Who are you requesting to speak with (clinical staff, provider,  specific staff member): YELENA MURGUIA         What was the call regarding: PATIENT STATED THAT THE EYE DR HE WAS REFERRED TO NEVER ANSWERS THE TELEPHONE. ALSO HE WANTED TO KNOW FROM HERE ON IF YOU CAN WRITE HIS PRESCRIPTIONS FOR 90 DAYS INSTEAD OF 30?

## 2023-10-05 ENCOUNTER — TELEPHONE (OUTPATIENT)
Dept: SURGERY | Facility: CLINIC | Age: 78
End: 2023-10-05
Payer: MEDICARE

## 2023-10-05 ENCOUNTER — TELEPHONE (OUTPATIENT)
Dept: SURGERY | Facility: CLINIC | Age: 78
End: 2023-10-05

## 2023-10-05 ENCOUNTER — OFFICE VISIT (OUTPATIENT)
Dept: SURGERY | Facility: CLINIC | Age: 78
End: 2023-10-05
Payer: MEDICARE

## 2023-10-05 VITALS
HEART RATE: 63 BPM | HEIGHT: 69 IN | OXYGEN SATURATION: 96 % | WEIGHT: 210.1 LBS | SYSTOLIC BLOOD PRESSURE: 124 MMHG | DIASTOLIC BLOOD PRESSURE: 66 MMHG | BODY MASS INDEX: 31.12 KG/M2

## 2023-10-05 DIAGNOSIS — K60.2 ANAL FISSURE: ICD-10-CM

## 2023-10-05 DIAGNOSIS — K59.00 CONSTIPATION, UNSPECIFIED CONSTIPATION TYPE: Primary | ICD-10-CM

## 2023-10-05 RX ORDER — LUBIPROSTONE 8 UG/1
8 CAPSULE ORAL 2 TIMES DAILY WITH MEALS
Qty: 60 CAPSULE | Refills: 0 | Status: SHIPPED | OUTPATIENT
Start: 2023-10-05 | End: 2023-10-06

## 2023-10-05 NOTE — PROGRESS NOTES
"Godfrey Shah is a 78 y.o. male in for follow up of Constipation, unspecified constipation type    Anal fissure    Pt presents today for a follow up for a chronic anal fissure and constipation:    Anal Fissure:  - Dx 02/2023  - Failed conservative management with Diltiazem/Lidocaine compound cream  - S/P colonoscopy with chemical sphincterotomy with Botox 09/21/2023  - Today, reports significant improvement in rectal pain since undergoing chemical sphincterotomy. Still experiencing some pain during defecation, but this resolves immediately after and does not linger <6 hours like it use to. Denies any RB.     Constipation:  - Fiber: Made stools too hard. No longer taking.  - Miralax: Experienced an episode of fecal incontinence while taking this medication, and therefore does not want to take again.   - Lactulose: Taking BID, but still having occasionally hard stools. Pt with Hx of DM Type II and experiencing some difficulty with glycemic control while taking this medication.         /66 (BP Location: Left arm, Patient Position: Sitting, Cuff Size: Adult)   Pulse 63   Ht 175.3 cm (69\")   Wt 95.3 kg (210 lb 1.6 oz)   SpO2 96%   BMI 31.03 kg/m²   Body mass index is 31.03 kg/m².      PE:  Physical Exam  Constitutional:       General: He is not in acute distress.     Appearance: He is well-developed.   HENT:      Head: Normocephalic and atraumatic.   Abdominal:      General: There is no distension.      Palpations: Abdomen is soft.   Musculoskeletal:         General: Normal range of motion.   Neurological:      Mental Status: He is alert.   Psychiatric:         Thought Content: Thought content normal.       Review of Medical Record:    Hx of Prostate Cancer:  - 2017   - S/P Radiation Therapy     Colonoscopy 04/2017:  - Normal per Pt  - Dr. Diaz    Colonoscopy 09/21/2023:  - Diverticulosis, Sigmoid Colon  - Moderately Enlarged Internal Hemorrhoids  - Anal Fissure. S/P Chemical Sphincterotomy with Botox  - " Rescope: 5 Years  - Dr. Kathi Melton, [facility]    FHx:  - No known FHx of colon polyps or colon cancer.       Assessment:   1. Constipation, unspecified constipation type    2. Anal fissure    - Improved    Plan:    Anal Fissure:  - S/P chemical sphincterotomy with Botox 09/21/2023 with improvement in rectal pain    Constipation:  - D/C Lactulose  - Start Linzess 72 mcg    Personal Hx of Colon Polyps:  - Next colonoscopy due 2028    Follow up in 8 weeks

## 2023-10-05 NOTE — TELEPHONE ENCOUNTER
Hub staff attempted to follow warm transfer process and was unsuccessful     Caller: Godfrey Shah    Relationship to patient: Self    Best call back number: [phone]  PT CAN BE REACHED AT ANYTIME, IF NO ANSWER A DETAILED MSG CAN BE LEFT    Patient is needing: A CALL BACK. PT STATES THE RX PRESCRIBED TODAY IS NOT COVERED UNDER INS. PT CANNOT PAY $300. PT SAYS HE CAN CONTINUE WITH THE PREVIOUSLY PRESCRIBED/OCD MEDICINE.

## 2023-10-06 ENCOUNTER — TELEPHONE (OUTPATIENT)
Dept: SURGERY | Facility: CLINIC | Age: 78
End: 2023-10-06
Payer: MEDICARE

## 2023-10-06 RX ORDER — PLECANATIDE 3 MG/1
3 TABLET ORAL DAILY
Qty: 30 TABLET | Refills: 0 | Status: SHIPPED | OUTPATIENT
Start: 2023-10-06 | End: 2023-11-05

## 2023-10-06 NOTE — TELEPHONE ENCOUNTER
HUB WARM TRANSFERRED PT.    PT STATES THAT THE 2ND RX YOU SENT IN, IS ALSO OVER #300.00.    HE WANTED ME TO FORWARD YOU THIS INFO THAT HE WILL CONTINUE TO STAY ON WHAT HE IS CURRENTLY ON AND WILL DISCUSS THIS AT HIS NEXT FOLLOW UP APPT WITH YOU. CHARISSA.    LAST O.V. 10/05/2023.  F/UP O.V. 11/30/2023.

## 2023-11-06 RX ORDER — LACTULOSE 10 G/15ML
SOLUTION ORAL
Qty: 900 ML | Refills: 0 | Status: SHIPPED | OUTPATIENT
Start: 2023-11-06

## 2023-11-06 RX ORDER — QUETIAPINE FUMARATE 100 MG/1
TABLET, FILM COATED ORAL
Qty: 90 TABLET | Refills: 2 | Status: SHIPPED | OUTPATIENT
Start: 2023-11-06

## 2023-11-06 NOTE — TELEPHONE ENCOUNTER
She sent RX for several meds, all too expensive. I tried to get PAs as well. He said he wanted to stick with Lactulose and says he is using less of it and watching his glucose levels.

## 2023-11-20 ENCOUNTER — OFFICE VISIT (OUTPATIENT)
Dept: FAMILY MEDICINE CLINIC | Facility: CLINIC | Age: 78
End: 2023-11-20
Payer: MEDICARE

## 2023-11-20 VITALS
HEIGHT: 69 IN | DIASTOLIC BLOOD PRESSURE: 68 MMHG | OXYGEN SATURATION: 94 % | WEIGHT: 209.8 LBS | HEART RATE: 60 BPM | BODY MASS INDEX: 31.07 KG/M2 | SYSTOLIC BLOOD PRESSURE: 130 MMHG

## 2023-11-20 DIAGNOSIS — E78.2 MIXED HYPERLIPIDEMIA: ICD-10-CM

## 2023-11-20 DIAGNOSIS — M75.81 RIGHT ROTATOR CUFF TENDONITIS: ICD-10-CM

## 2023-11-20 DIAGNOSIS — Z00.00 MEDICARE ANNUAL WELLNESS VISIT, SUBSEQUENT: Primary | ICD-10-CM

## 2023-11-20 DIAGNOSIS — E11.59 TYPE 2 DIABETES MELLITUS WITH OTHER CIRCULATORY COMPLICATION, WITHOUT LONG-TERM CURRENT USE OF INSULIN: ICD-10-CM

## 2023-11-20 DIAGNOSIS — I10 ESSENTIAL HYPERTENSION: ICD-10-CM

## 2023-11-20 RX ORDER — PANTOPRAZOLE SODIUM 40 MG/1
40 TABLET, DELAYED RELEASE ORAL DAILY
Qty: 90 TABLET | Refills: 2 | Status: SHIPPED | OUTPATIENT
Start: 2023-11-20

## 2023-11-20 NOTE — PROGRESS NOTES
The ABCs of the Annual Wellness Visit  Subsequent Medicare Wellness Visit    Subjective    Godfrey Shah is a 78 y.o. male who presents for a Subsequent Medicare Wellness Visit.    The following portions of the patient's history were reviewed and   updated as appropriate: allergies, current medications, past family history, past medical history, past social history, past surgical history, and problem list.    Compared to one year ago, the patient feels his physical   health is the same.    Compared to one year ago, the patient feels his mental   health is the same.    Recent Hospitalizations:  He was not admitted to the hospital during the last year.       Current Medical Providers:  Patient Care Team:  Godfrey Jackson MD as PCP - General (Family Medicine)  Hanane Perez PA-C as Physician Assistant (Colon and Rectal Surgery)  Jean Pierre Diza MD as Consulting Physician (Gastroenterology)  Godfrey Rogers MD as Consulting Physician (Dermatology)  Jed Neville Jr., MD as Consulting Physician (Urology)  Lindy Cui MD as Consulting Physician (Ophthalmology)  Rajinder Naidu MD as Consulting Physician (Sleep Medicine)    Outpatient Medications Prior to Visit   Medication Sig Dispense Refill    albuterol sulfate HFA (Ventolin HFA) 108 (90 Base) MCG/ACT inhaler Inhale 2 puffs Every 4 (Four) Hours As Needed for Wheezing. 18 g 3    aspirin (aspirin) 81 MG EC tablet Take 1 tablet by mouth Daily.      atorvastatin (LIPITOR) 20 MG tablet TAKE 1 TABLET BY MOUTH EVERY NIGHT 90 tablet 3    cetirizine (ZyrTEC) 10 MG tablet Take 1 tablet by mouth Daily.      lactulose (CHRONULAC) 10 GM/15ML solution TAKE 15 MLS BY MOUTH 2 TIMES A  mL 0    latanoprost (XALATAN) 0.005 % ophthalmic solution instill 1 drop by ophthalmic route  every day into both eye(s) every day the evening      losartan-hydrochlorothiazide (Hyzaar) 100-25 MG per tablet Take 1 tablet by mouth Daily. 90 tablet 3    meclizine  (ANTIVERT) 25 MG tablet TAKE 1 TABLET BY MOUTH THREE TIMES A DAY AS NEEDED FOR DIZZINESS. 30 tablet 0    metoprolol succinate XL (TOPROL-XL) 25 MG 24 hr tablet Take 1 tablet by mouth Daily. 90 tablet 3    nitroglycerin (NITROSTAT) 0.4 MG SL tablet Place 1 tablet under the tongue Every 5 (Five) Minutes As Needed for Chest Pain (Chest Pain With Systolic Blood Pressure Greater Than 100). 30 tablet 12    pantoprazole (PROTONIX) 40 MG EC tablet TAKE 1 TABLET BY MOUTH EVERY DAY 90 tablet 2    QUEtiapine (SEROquel) 100 MG tablet TAKE 1 TABLET BY MOUTH EVERY DAY AT NIGHT 90 tablet 2    warfarin (Jantoven) 3 MG tablet TAKE 1 TABLET BY MOUTH EVERY DAY (Patient taking differently: Take  by mouth Every Night. HOLD X 5 DAYS FOR ENDO) 90 tablet 1     No facility-administered medications prior to visit.       No opioid medication identified on active medication list. I have reviewed chart for other potential  high risk medication/s and harmful drug interactions in the elderly.        Aspirin is on active medication list. Aspirin use is indicated based on review of current medical condition/s. Pros and cons of this therapy have been discussed today. Benefits of this medication outweigh potential harm.  Patient has been encouraged to continue taking this medication.  .      Patient Active Problem List   Diagnosis    Bradycardia, sinus    Sleep-disordered breathing    Symptomatic bradycardia    AV block    Prostate cancer    Pacemaker    Insomnia    RYAN (obstructive sleep apnea)    Nephrolithiasis    Hyperglycemia    GERD (gastroesophageal reflux disease)    Osteoarthritis    COPD (chronic obstructive pulmonary disease)    Inability to attain erection    Coronary artery disease    Visit for suture removal    Medicare annual wellness visit, subsequent    Mixed hyperlipidemia    COPD with acute exacerbation    Essential hypertension    Paroxysmal atrial fibrillation    Other age-related cataract    High risk medication use     "Asymptomatic microscopic hematuria    Type 2 diabetes mellitus with circulatory disorder, without long-term current use of insulin    Other hemorrhoids    Benign paroxysmal positional vertigo    First degree heart block    Anal fissure    Rectal pain    Personal history of colonic polyps    Right rotator cuff tendonitis     Advance Care Planning   Advance Care Planning     Advance Directive is on file.  ACP discussion was held with the patient during this visit. Patient has an advance directive in EMR which is still valid.      Objective    Vitals:    23 0854   BP: 130/68   BP Location: Left arm   Patient Position: Sitting   Cuff Size: Adult   Pulse: 60   SpO2: 94%   Weight: 95.2 kg (209 lb 12.8 oz)   Height: 175.3 cm (69\")     Estimated body mass index is 30.98 kg/m² as calculated from the following:    Height as of this encounter: 175.3 cm (69\").    Weight as of this encounter: 95.2 kg (209 lb 12.8 oz).    BMI is >= 30 and <35. (Class 1 Obesity). The following options were offered after discussion;: exercise counseling/recommendations      Does the patient have evidence of cognitive impairment? No          HEALTH RISK ASSESSMENT    Smoking Status:  Social History     Tobacco Use   Smoking Status Former    Packs/day: 1.00    Years: 47.00    Additional pack years: 0.00    Total pack years: 47.00    Types: Cigarettes    Quit date: 10/1999    Years since quittin.1    Passive exposure: Past   Smokeless Tobacco Never   Tobacco Comments    Quit      Alcohol Consumption:  Social History     Substance and Sexual Activity   Alcohol Use No     Fall Risk Screen:    JAE Fall Risk Assessment was completed, and patient is at LOW risk for falls.Assessment completed on:2023    Depression Screenin/20/2023     8:54 AM   PHQ-2/PHQ-9 Depression Screening   Little Interest or Pleasure in Doing Things 0-->not at all   Feeling Down, Depressed or Hopeless 0-->not at all   PHQ-9: Brief Depression Severity " Measure Score 0       Health Habits and Functional and Cognitive Screenin/20/2023     8:00 AM   Functional & Cognitive Status   Do you have difficulty preparing food and eating? No   Do you have difficulty bathing yourself, getting dressed or grooming yourself? No   Do you have difficulty using the toilet? No   Do you have difficulty moving around from place to place? No   Do you have trouble with steps or getting out of a bed or a chair? No   Current Diet Well Balanced Diet   Dental Exam Up to date   Eye Exam Up to date   Exercise (times per week) 7 times per week   Current Exercises Include Walking   Do you need help using the phone?  No   Are you deaf or do you have serious difficulty hearing?  No   Do you need help to go to places out of walking distance? No   Do you need help shopping? No   Do you need help preparing meals?  No   Do you need help with housework?  No   Do you need help with laundry? No   Do you need help taking your medications? No   Do you need help managing money? No   Do you ever drive or ride in a car without wearing a seat belt? No   Have you felt unusual stress, anger or loneliness in the last month? No   Who do you live with? Spouse   If you need help, do you have trouble finding someone available to you? No   Have you been bothered in the last four weeks by sexual problems? No   Do you have difficulty concentrating, remembering or making decisions? No       Age-appropriate Screening Schedule:  Refer to the list below for future screening recommendations based on patient's age, sex and/or medical conditions. Orders for these recommended tests are listed in the plan section. The patient has been provided with a written plan.    Health Maintenance   Topic Date Due    HEMOGLOBIN A1C  11/15/2023    LIPID PANEL  2023    DIABETIC EYE EXAM  2023    URINE MICROALBUMIN  05/15/2024    ANNUAL WELLNESS VISIT  2024    BMI FOLLOWUP  2024    TDAP/TD VACCINES (2 - Tdap)  "06/16/2026    COLORECTAL CANCER SCREENING  09/21/2033    HEPATITIS C SCREENING  Completed    COVID-19 Vaccine  Completed    INFLUENZA VACCINE  Completed    Pneumococcal Vaccine 65+  Completed    ZOSTER VACCINE  Completed                  CMS Preventative Services Quick Reference  Risk Factors Identified During Encounter  Inactivity/Sedentary: Patient was advised to exercise at least 150 minutes a week per CDC recommendations.  The above risks/problems have been discussed with the patient.  Pertinent information has been shared with the patient in the After Visit Summary.  An After Visit Summary and PPPS were made available to the patient.    Follow Up:   Next Medicare Wellness visit to be scheduled in 1 year.       Additional E&M Note during same encounter follows:  Patient has multiple medical problems which are significant and separately identifiable that require additional work above and beyond the Medicare Wellness Visit.      Chief Complaint  Annual Exam ([place] )    Subjective        HPI  Godfrey Shah is also being seen today for R shoulder pain.  Going on months.  No better.      Review of Systems   Constitutional:  Negative for chills, diaphoresis and fatigue.   HENT:  Negative for rhinorrhea.    Respiratory:  Negative for cough and shortness of breath.    Cardiovascular:  Negative for chest pain and leg swelling.   Gastrointestinal:  Negative for abdominal distention and abdominal pain.   Musculoskeletal:  Positive for arthralgias. Negative for back pain.   Skin:  Negative for rash.       Objective   Vital Signs:  /68 (BP Location: Left arm, Patient Position: Sitting, Cuff Size: Adult)   Pulse 60   Ht 175.3 cm (69\")   Wt 95.2 kg (209 lb 12.8 oz)   SpO2 94%   BMI 30.98 kg/m²     Physical Exam  Vitals reviewed.   Constitutional:       Appearance: He is well-developed. He is not diaphoretic.   HENT:      Head: Normocephalic and atraumatic.   Eyes:      General: No scleral icterus.     Pupils: " Pupils are equal, round, and reactive to light.   Neck:      Thyroid: No thyromegaly.   Cardiovascular:      Rate and Rhythm: Normal rate and regular rhythm.      Heart sounds: No murmur heard.     No friction rub. No gallop.   Pulmonary:      Effort: Pulmonary effort is normal. No respiratory distress.      Breath sounds: No wheezing or rales.   Chest:      Chest wall: No tenderness.   Abdominal:      General: Bowel sounds are normal. There is no distension.      Palpations: Abdomen is soft.      Tenderness: There is no abdominal tenderness.   Musculoskeletal:         General: No deformity. Normal range of motion.      Comments: Pain with abduction past 90 degrees.   Lymphadenopathy:      Cervical: No cervical adenopathy.   Skin:     General: Skin is warm and dry.      Findings: No rash.   Neurological:      Cranial Nerves: No cranial nerve deficit.      Motor: No abnormal muscle tone.                         Assessment and Plan   Diagnoses and all orders for this visit:    1. Medicare annual wellness visit, subsequent (Primary)    2. Mixed hyperlipidemia  -     Comprehensive Metabolic Panel  -     Lipid Panel With / Chol / HDL Ratio    3. Essential hypertension  -     Comprehensive Metabolic Panel    4. Type 2 diabetes mellitus with other circulatory complication, without long-term current use of insulin  -     Hemoglobin A1c    5. Right rotator cuff tendonitis        R rotator cuff tendonitis.  Home PT given.  Avoid heavy weight.       Preventive Counseling:  Encouraged to stay active.  Covid vaccine UTD. RSV utd, flu utd HEP A UTD. Pneumovax utd.     Dentist UTD.  Optho UTD.           Follow Up   Return in about 6 months (around 5/20/2024).  Patient was given instructions and counseling regarding his condition or for health maintenance advice. Please see specific information pulled into the AVS if appropriate.

## 2023-11-21 LAB
ALBUMIN SERPL-MCNC: 4.5 G/DL (ref 3.5–5.2)
ALBUMIN/GLOB SERPL: 2 G/DL
ALP SERPL-CCNC: 59 U/L (ref 39–117)
ALT SERPL-CCNC: 16 U/L (ref 1–41)
AST SERPL-CCNC: 21 U/L (ref 1–40)
BILIRUB SERPL-MCNC: 0.9 MG/DL (ref 0–1.2)
BUN SERPL-MCNC: 15 MG/DL (ref 8–23)
BUN/CREAT SERPL: 13 (ref 7–25)
CALCIUM SERPL-MCNC: 9.1 MG/DL (ref 8.6–10.5)
CHLORIDE SERPL-SCNC: 105 MMOL/L (ref 98–107)
CHOLEST SERPL-MCNC: 169 MG/DL (ref 0–200)
CHOLEST/HDLC SERPL: 4.02 {RATIO}
CO2 SERPL-SCNC: 28.5 MMOL/L (ref 22–29)
CREAT SERPL-MCNC: 1.15 MG/DL (ref 0.76–1.27)
EGFRCR SERPLBLD CKD-EPI 2021: 65.1 ML/MIN/1.73
GLOBULIN SER CALC-MCNC: 2.3 GM/DL
GLUCOSE SERPL-MCNC: 117 MG/DL (ref 65–99)
HBA1C MFR BLD: 6.1 % (ref 4.8–5.6)
HDLC SERPL-MCNC: 42 MG/DL (ref 40–60)
LDLC SERPL CALC-MCNC: 97 MG/DL (ref 0–100)
POTASSIUM SERPL-SCNC: 3.8 MMOL/L (ref 3.5–5.2)
PROT SERPL-MCNC: 6.8 G/DL (ref 6–8.5)
SODIUM SERPL-SCNC: 143 MMOL/L (ref 136–145)
TRIGL SERPL-MCNC: 171 MG/DL (ref 0–150)
VLDLC SERPL CALC-MCNC: 30 MG/DL (ref 5–40)

## 2024-01-03 RX ORDER — WARFARIN SODIUM 3 MG/1
TABLET ORAL
Qty: 90 TABLET | Refills: 2 | Status: SHIPPED | OUTPATIENT
Start: 2024-01-03

## 2024-02-13 ENCOUNTER — TELEPHONE (OUTPATIENT)
Dept: SURGERY | Facility: CLINIC | Age: 79
End: 2024-02-13
Payer: MEDICARE

## 2024-02-13 RX ORDER — LACTULOSE 10 G/15ML
10 SOLUTION ORAL 2 TIMES DAILY
Qty: 900 ML | Refills: 0 | Status: SHIPPED | OUTPATIENT
Start: 2024-02-13

## 2024-05-20 ENCOUNTER — OFFICE VISIT (OUTPATIENT)
Dept: FAMILY MEDICINE CLINIC | Facility: CLINIC | Age: 79
End: 2024-05-20
Payer: MEDICARE

## 2024-05-20 VITALS
DIASTOLIC BLOOD PRESSURE: 62 MMHG | RESPIRATION RATE: 18 BRPM | TEMPERATURE: 97.3 F | WEIGHT: 210 LBS | HEIGHT: 69 IN | BODY MASS INDEX: 31.1 KG/M2 | SYSTOLIC BLOOD PRESSURE: 124 MMHG | HEART RATE: 60 BPM | OXYGEN SATURATION: 95 %

## 2024-05-20 DIAGNOSIS — E11.59 TYPE 2 DIABETES MELLITUS WITH OTHER CIRCULATORY COMPLICATION, WITHOUT LONG-TERM CURRENT USE OF INSULIN: Primary | ICD-10-CM

## 2024-05-20 DIAGNOSIS — E78.2 MIXED HYPERLIPIDEMIA: ICD-10-CM

## 2024-05-20 DIAGNOSIS — I10 ESSENTIAL HYPERTENSION: ICD-10-CM

## 2024-05-20 PROCEDURE — 3078F DIAST BP <80 MM HG: CPT | Performed by: FAMILY MEDICINE

## 2024-05-20 PROCEDURE — 99214 OFFICE O/P EST MOD 30 MIN: CPT | Performed by: FAMILY MEDICINE

## 2024-05-20 PROCEDURE — 3074F SYST BP LT 130 MM HG: CPT | Performed by: FAMILY MEDICINE

## 2024-05-20 PROCEDURE — 1126F AMNT PAIN NOTED NONE PRSNT: CPT | Performed by: FAMILY MEDICINE

## 2024-05-20 PROCEDURE — G2211 COMPLEX E/M VISIT ADD ON: HCPCS | Performed by: FAMILY MEDICINE

## 2024-05-20 RX ORDER — LOSARTAN POTASSIUM AND HYDROCHLOROTHIAZIDE 25; 100 MG/1; MG/1
1 TABLET ORAL DAILY
Start: 2024-05-20

## 2024-05-20 RX ORDER — METOPROLOL SUCCINATE 25 MG/1
25 TABLET, EXTENDED RELEASE ORAL DAILY
Qty: 90 TABLET | Refills: 3 | Status: SHIPPED | OUTPATIENT
Start: 2024-05-20

## 2024-05-20 RX ORDER — WARFARIN SODIUM 5 MG/1
5 TABLET ORAL
COMMUNITY

## 2024-05-20 NOTE — PROGRESS NOTES
Chief Complaint   Patient presents with    Hypertension    Hyperlipidemia    Diabetes       Subjective   Godfrey Shah is a 78 y.o. male.     Hypertension  Pertinent negatives include no blurred vision, chest pain or shortness of breath.   Hyperlipidemia  Pertinent negatives include no chest pain, myalgias or shortness of breath.   Diabetes  Pertinent negatives for hypoglycemia include no confusion. Pertinent negatives for diabetes include no blurred vision, no chest pain, no fatigue, no polyuria and no weakness.      Fu HTN.  No better.  Doing well with meds.    F/U hyperlipidmeia with CAD.  Doing wellw ith meds.    F/U DM2.  Doing well with meds.    The following portions of the patient's history were reviewed and updated as appropriate: allergies, current medications, past family history, past medical history, past social history, past surgical history and problem list.    Review of Systems   Constitutional:  Negative for appetite change and fatigue.   HENT:  Negative for nosebleeds and sore throat.    Eyes:  Negative for blurred vision and visual disturbance.   Respiratory:  Negative for shortness of breath and wheezing.    Cardiovascular:  Negative for chest pain and leg swelling.   Gastrointestinal:  Negative for abdominal distention and abdominal pain.   Endocrine: Negative for cold intolerance and polyuria.   Genitourinary:  Negative for dysuria and hematuria.   Musculoskeletal:  Negative for arthralgias and myalgias.   Skin:  Negative for color change and rash.   Neurological:  Negative for weakness and confusion.   Psychiatric/Behavioral:  Negative for agitation and depressed mood.        Patient Active Problem List   Diagnosis    Bradycardia, sinus    Sleep-disordered breathing    Symptomatic bradycardia    AV block    Prostate cancer    Pacemaker    Insomnia    RYAN (obstructive sleep apnea)    Nephrolithiasis    Hyperglycemia    GERD (gastroesophageal reflux disease)    Osteoarthritis    COPD (chronic  obstructive pulmonary disease)    Inability to attain erection    Coronary artery disease    Visit for suture removal    Medicare annual wellness visit, subsequent    Mixed hyperlipidemia    COPD with acute exacerbation    Essential hypertension    Paroxysmal atrial fibrillation    Other age-related cataract    High risk medication use    Asymptomatic microscopic hematuria    Type 2 diabetes mellitus with circulatory disorder, without long-term current use of insulin    Other hemorrhoids    Benign paroxysmal positional vertigo    First degree heart block    Anal fissure    Rectal pain    Personal history of colonic polyps    Right rotator cuff tendonitis       No Known Allergies      Current Outpatient Medications:     albuterol sulfate HFA (Ventolin HFA) 108 (90 Base) MCG/ACT inhaler, Inhale 2 puffs Every 4 (Four) Hours As Needed for Wheezing., Disp: 18 g, Rfl: 3    aspirin (aspirin) 81 MG EC tablet, Take 1 tablet by mouth Daily., Disp: , Rfl:     atorvastatin (LIPITOR) 20 MG tablet, TAKE 1 TABLET BY MOUTH EVERY NIGHT, Disp: 90 tablet, Rfl: 3    cetirizine (ZyrTEC) 10 MG tablet, Take 1 tablet by mouth Daily., Disp: , Rfl:     lactulose (CHRONULAC) 10 GM/15ML solution, Take 15 mL by mouth 2 (Two) Times a Day., Disp: 900 mL, Rfl: 0    latanoprost (XALATAN) 0.005 % ophthalmic solution, instill 1 drop by ophthalmic route  every day into both eye(s) every day the evening, Disp: , Rfl:     losartan-hydrochlorothiazide (Hyzaar) 100-25 MG per tablet, Take 1 tablet by mouth Daily., Disp: , Rfl:     meclizine (ANTIVERT) 25 MG tablet, TAKE 1 TABLET BY MOUTH THREE TIMES A DAY AS NEEDED FOR DIZZINESS., Disp: 30 tablet, Rfl: 0    metoprolol succinate XL (TOPROL-XL) 25 MG 24 hr tablet, Take 1 tablet by mouth Daily., Disp: 90 tablet, Rfl: 3    nitroglycerin (NITROSTAT) 0.4 MG SL tablet, Place 1 tablet under the tongue Every 5 (Five) Minutes As Needed for Chest Pain (Chest Pain With Systolic Blood Pressure Greater Than 100)., Disp:  30 tablet, Rfl: 12    pantoprazole (PROTONIX) 40 MG EC tablet, Take 1 tablet by mouth Daily., Disp: 90 tablet, Rfl: 2    QUEtiapine (SEROquel) 100 MG tablet, TAKE 1 TABLET BY MOUTH EVERY DAY AT NIGHT, Disp: 90 tablet, Rfl: 2    warfarin (Jantoven) 3 MG tablet, TAKE 1 TABLET BY MOUTH EVERY DAY or as directed by MD depending on INR., Disp: 90 tablet, Rfl: 2    warfarin (COUMADIN) 5 MG tablet, Take 1 tablet by mouth Daily., Disp: , Rfl:     Past Medical History:   Diagnosis Date    Abnormal CXR 09/2016    Alcohol use 08/18/2016    SEEN AT [facility]    Allergic rhinitis     Anal fissure 07/26/2023    Anticoagulant long-term use     Anxiety     Arm mass, right     ASCVD (arteriosclerotic cardiovascular disease)     Asthma     MILD, INTERMITTENT    AV block, complete     BCC (basal cell carcinoma) 10/2020    OF TRUNK, FOLLOWED BY DR. GUZMAN RIVERS    Biceps muscle tear 03/01/2017    BPH (benign prostatic hyperplasia)     FOLLOWED BY DR. BENNY OVALLE    Bradycardia 01/2022    CAD (coronary artery disease)     Cardiac pacemaker in situ     Cataract     BILATERAL    Chronic constipation     Colon polyps     FOLLOWED BY DR. ELLIE KUMAR    COPD (chronic obstructive pulmonary disease)     FOLLOWED BY DR. ALAN COLE    Cyst of kidney, acquired     Dacryoadenitis of both lacrimal glands     FOLLOWED BY DR. JADA NEW    Depression     Diplopia     Eczema     ED (erectile dysfunction)     Gastroduodenitis 11/2017    GERD (gastroesophageal reflux disease)     Glaucoma     BILATERAL    Hematuria     Hemorrhoids     Hyperglycemia     Hyperlipidemia     MIXED HLD    Hypertension     Inability to attain erection     Insect bite of right upper arm 09/07/2017    Insomnia     Kidney stones     Laceration of foot 10/16/2019    LEFT FOOT, SEEN AT [facility]    Left kidney mass     BEING FOLLOWED BY DR. 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    Pleural effusion 06/2019    Pneumothorax 04/2019    Prostate cancer 05/2017    Pulmonary infiltrate 06/2018    Rectal bleeding 12/2022    WITH RECTAL PAIN    Regular astigmatism of left eye     Rotator cuff tendonitis, right     Seborrheic keratosis     Snoring     SSS (sick sinus syndrome)     Syncope and collapse 09/2020    Tachycardia 07/2022    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    Urinary incontinence 01/2022    Vertigo 05/02/2014    SEEN AT [facility]       Past Surgical History:   Procedure Laterality Date    CARDIAC CATHETERIZATION N/A 04/17/2019    Procedure: LEFT HEART CATH;  Surgeon: Lalo Naranjo MD;  Location:  [facility];  Service: Cardiovascular    CARDIAC CATHETERIZATION N/A 04/17/2019    Procedure: CORONARY ANGIOGRAPHY;  Surgeon: Lalo Naranjo MD;  Location:  [facility];  Service: Cardiovascular    CARDIAC CATHETERIZATION N/A 04/17/2019    Procedure: Left ventriculography;  Surgeon: Lalo Naranjo MD;  Location:  [facility];  Service: Cardiovascular    CARDIAC ELECTROPHYSIOLOGY PROCEDURE Left 04/18/2019    Procedure: Pacemaker DC new;  Surgeon: Curtis Moore DO;  Location:  [facility];  Service: Cardiovascular    COLONOSCOPY N/A 04/2017    COLONOSCOPY N/A 2014    [facility]    COLONOSCOPY N/A 09/21/2023    DIVERTICULOSIS IN SIGMOID, MODERATE HEMORRHOIDS, ANAL FISSURE- INJECTED WITH BOTOX, RESCOPE IN 5 YRS, DR. HARDEEP POOL AT [facility]    COLONOSCOPY W/ POLYPECTOMY N/A 03/2011    POLYPS, RESCOPE IN 5 YRS    CORONARY ANGIOPLASTY WITH STENT PLACEMENT      CYST REMOVAL      CYSTOSCOPY RETROGRADE PYELOGRAM N/A 05/28/2014    DR. YOHANA REIS AT [place]    ENDOSCOPY N/A 12/04/2003    DUODENAL POLYPS X3, PATH: MILD CHRONIC DUODENITIS, DR. KELLER  SAIRA AT [facility]    LACERATION REPAIR Left 10/16/2019    LEFT FOOT, DONE AT [facility] ER    PENILE PROSTHESIS IMPLANT N/A     PROSTATE BIOPSY Bilateral 2015    NEEDLE BIOPSY, WNL    RECTAL BOTOX INJECTION N/A 2023    ANAL FISSURE INJECTED WITH 70 UNITS OF BOTOX, DR. HARDEEP POOL AT [facility]    VASECTOMY N/A     REVERSED- ?       Family History   Problem Relation Age of Onset    No Known Problems Mother     Heart disease Father     Coronary artery disease Father     Heart disease Sister     Depression Sister     Depression Brother     Heart disease Brother     Malig Hyperthermia Neg Hx        Social History     Tobacco Use    Smoking status: Former     Current packs/day: 0.00     Average packs/day: 1 pack/day for 47.0 years (47.0 ttl pk-yrs)     Types: Cigarettes     Start date: 10/1952     Quit date: 10/1999     Years since quittin.6     Passive exposure: Past    Smokeless tobacco: Never    Tobacco comments:     Quit    Substance Use Topics    Alcohol use: No            Objective     Vitals:    24 1117   BP: 124/62   Pulse: 60   Resp: 18   Temp: 97.3 °F (36.3 °C)   SpO2: 95%     Body mass index is 31 kg/m².    Physical Exam  Vitals reviewed.   Constitutional:       Appearance: He is well-developed. He is not diaphoretic.   HENT:      Head: Normocephalic and atraumatic.   Eyes:      General: No scleral icterus.     Pupils: Pupils are equal, round, and reactive to light.   Neck:      Thyroid: No thyromegaly.   Cardiovascular:      Rate and Rhythm: Normal rate and regular rhythm.      Heart sounds: No murmur heard.     No friction rub. No gallop.   Pulmonary:      Effort: Pulmonary effort is normal. No respiratory distress.      Breath sounds: No wheezing or rales.   Chest:      Chest wall: No tenderness.   Abdominal:      General: Bowel sounds are normal. There is no distension.      Palpations: Abdomen is soft.      Tenderness: There is no abdominal tenderness.   Musculoskeletal:         General: No  deformity. Normal range of motion.   Lymphadenopathy:      Cervical: No cervical adenopathy.   Skin:     General: Skin is warm and dry.      Findings: No rash.   Neurological:      Cranial Nerves: No cranial nerve deficit.      Motor: No abnormal muscle tone.         Lab Results   Component Value Date    GLUCOSE 117 (H) 11/20/2023    BUN 15 11/20/2023    CREATININE 1.15 11/20/2023    EGFRIFNONA 61 11/11/2021    EGFRIFAFRI 71 11/11/2021    BCR 13.0 11/20/2023    K 3.8 11/20/2023    CO2 28.5 11/20/2023    CALCIUM 9.1 11/20/2023    PROTENTOTREF 6.8 11/20/2023    ALBUMIN 4.5 11/20/2023    LABIL2 2.0 11/20/2023    AST 21 11/20/2023    ALT 16 11/20/2023       WBC   Date Value Ref Range Status   11/16/2022 5.18 3.40 - 10.80 10*3/mm3 Final     RBC   Date Value Ref Range Status   11/16/2022 4.76 4.14 - 5.80 10*6/mm3 Final     Hemoglobin   Date Value Ref Range Status   11/16/2022 15.2 13.0 - 17.7 g/dL Final   07/21/2019 15.2 13.0 - 17.7 g/dL Final     Hematocrit   Date Value Ref Range Status   11/16/2022 45.4 37.5 - 51.0 % Final   07/21/2019 45.8 37.5 - 51.0 % Final     MCV   Date Value Ref Range Status   11/16/2022 95.4 79.0 - 97.0 fL Final   07/21/2019 97.2 (H) 79.0 - 97.0 fL Final     MCH   Date Value Ref Range Status   11/16/2022 31.9 26.6 - 33.0 pg Final   07/21/2019 32.3 26.6 - 33.0 pg Final     MCHC   Date Value Ref Range Status   11/16/2022 33.5 31.5 - 35.7 g/dL Final   07/21/2019 33.2 31.5 - 35.7 g/dL Final     RDW   Date Value Ref Range Status   11/16/2022 12.9 12.3 - 15.4 % Final   07/21/2019 12.4 12.3 - 15.4 % Final     RDW-SD   Date Value Ref Range Status   07/21/2019 44.6 37.0 - 54.0 fl Final     MPV   Date Value Ref Range Status   07/21/2019 10.0 6.0 - 12.0 fL Final     Platelets   Date Value Ref Range Status   11/16/2022 145 140 - 450 10*3/mm3 Final   07/21/2019 115 (L) 140 - 450 10*3/mm3 Final     Neutrophil Rel %   Date Value Ref Range Status   11/16/2022 36.5 (L) 42.7 - 76.0 % Final     Neutrophil %   Date  Value Ref Range Status   07/21/2019 60.2 42.7 - 76.0 % Final     Lymphocyte Rel %   Date Value Ref Range Status   11/16/2022 49.2 (H) 19.6 - 45.3 % Final     Lymphocyte %   Date Value Ref Range Status   07/21/2019 32.2 19.6 - 45.3 % Final     Monocyte Rel %   Date Value Ref Range Status   11/16/2022 9.1 5.0 - 12.0 % Final     Monocyte %   Date Value Ref Range Status   07/21/2019 6.4 5.0 - 12.0 % Final     Eosinophil Rel %   Date Value Ref Range Status   11/16/2022 4.4 0.3 - 6.2 % Final     Eosinophil %   Date Value Ref Range Status   07/21/2019 0.8 0.3 - 6.2 % Final     Basophil Rel %   Date Value Ref Range Status   11/16/2022 0.6 0.0 - 1.5 % Final     Basophil %   Date Value Ref Range Status   07/21/2019 0.2 0.0 - 1.5 % Final     Immature Grans %   Date Value Ref Range Status   08/18/2016 0.0 0.0 - 0.5 % Final     Neutrophils Absolute   Date Value Ref Range Status   11/16/2022 1.89 1.70 - 7.00 10*3/mm3 Final     Neutrophils, Absolute   Date Value Ref Range Status   07/21/2019 2.84 1.70 - 7.00 10*3/mm3 Final     Lymphocytes Absolute   Date Value Ref Range Status   11/16/2022 2.55 0.70 - 3.10 10*3/mm3 Final     Lymphocytes, Absolute   Date Value Ref Range Status   07/21/2019 1.52 0.70 - 3.10 10*3/mm3 Final     Monocytes Absolute   Date Value Ref Range Status   11/16/2022 0.47 0.10 - 0.90 10*3/mm3 Final     Monocytes, Absolute   Date Value Ref Range Status   07/21/2019 0.30 0.10 - 0.90 10*3/mm3 Final     Eosinophils Absolute   Date Value Ref Range Status   11/16/2022 0.23 0.00 - 0.40 10*3/mm3 Final     Eosinophils, Absolute   Date Value Ref Range Status   07/21/2019 0.04 0.00 - 0.40 10*3/mm3 Final     Basophils Absolute   Date Value Ref Range Status   11/16/2022 0.03 0.00 - 0.20 10*3/mm3 Final     Basophils, Absolute   Date Value Ref Range Status   07/21/2019 0.01 0.00 - 0.20 10*3/mm3 Final     Immature Grans, Absolute   Date Value Ref Range Status   08/18/2016 0.00 0.00 - 0.03 10*3/mm3 Final     nRBC   Date Value Ref  Range Status   11/16/2022 0.0 0.0 - 0.2 /100 WBC Final       Lab Results   Component Value Date    HGBA1C 6.10 (H) 11/20/2023       Lab Results   Component Value Date    HIOPDFNV93 239 11/11/2021       TSH   Date Value Ref Range Status   04/21/2022 1.360 0.450 - 4.500 uIU/mL Final   04/15/2019 1.030 0.270 - 4.200 mIU/mL Final       Lab Results   Component Value Date    CHOL 149 04/18/2019     Lab Results   Component Value Date    TRIG 171 (H) 11/20/2023     Lab Results   Component Value Date    HDL 42 11/20/2023     Lab Results   Component Value Date    LDL 97 11/20/2023     Lab Results   Component Value Date    VLDL 30 11/20/2023     Lab Results   Component Value Date    LDLHDL 2.84 04/18/2019         Procedures    Assessment & Plan   Problems Addressed this Visit       Mixed hyperlipidemia    Relevant Orders    Comprehensive Metabolic Panel    Lipid Panel With / Chol / HDL Ratio    Essential hypertension    Relevant Medications    losartan-hydrochlorothiazide (Hyzaar) 100-25 MG per tablet    metoprolol succinate XL (TOPROL-XL) 25 MG 24 hr tablet    Type 2 diabetes mellitus with circulatory disorder, without long-term current use of insulin - Primary    Relevant Orders    Comprehensive Metabolic Panel    Hemoglobin A1c    Microalbumin / Creatinine Urine Ratio - Urine, Clean Catch     Diagnoses         Codes Comments    Type 2 diabetes mellitus with other circulatory complication, without long-term current use of insulin    -  Primary ICD-10-CM: E11.59  ICD-9-CM: 250.70     Essential hypertension     ICD-10-CM: I10  ICD-9-CM: 401.9     Mixed hyperlipidemia     ICD-10-CM: E78.2  ICD-9-CM: 272.2           Dm2.  Controlled.  Check A1c, CMP, urine micro.      HTN.  Controlled. RF losartan/hct and metoprolol.  Hyperlipidmeia. Check FLP, CMP.  Continue atorvastatin 20 a day.      Orders Placed This Encounter   Procedures    Comprehensive Metabolic Panel     Order Specific Question:   Release to patient     Answer:    Routine Release [1400000002]    Lipid Panel With / Chol / HDL Ratio     Order Specific Question:   Release to patient     Answer:   Routine Release [1400000002]    Hemoglobin A1c     Order Specific Question:   Release to patient     Answer:   Routine Release [1400000002]    Microalbumin / Creatinine Urine Ratio - Urine, Clean Catch     Order Specific Question:   Release to patient     Answer:   Routine Release [1400000002]       Current Outpatient Medications   Medication Sig Dispense Refill    albuterol sulfate HFA (Ventolin HFA) 108 (90 Base) MCG/ACT inhaler Inhale 2 puffs Every 4 (Four) Hours As Needed for Wheezing. 18 g 3    aspirin (aspirin) 81 MG EC tablet Take 1 tablet by mouth Daily.      atorvastatin (LIPITOR) 20 MG tablet TAKE 1 TABLET BY MOUTH EVERY NIGHT 90 tablet 3    cetirizine (ZyrTEC) 10 MG tablet Take 1 tablet by mouth Daily.      lactulose (CHRONULAC) 10 GM/15ML solution Take 15 mL by mouth 2 (Two) Times a Day. 900 mL 0    latanoprost (XALATAN) 0.005 % ophthalmic solution instill 1 drop by ophthalmic route  every day into both eye(s) every day the evening      losartan-hydrochlorothiazide (Hyzaar) 100-25 MG per tablet Take 1 tablet by mouth Daily.      meclizine (ANTIVERT) 25 MG tablet TAKE 1 TABLET BY MOUTH THREE TIMES A DAY AS NEEDED FOR DIZZINESS. 30 tablet 0    metoprolol succinate XL (TOPROL-XL) 25 MG 24 hr tablet Take 1 tablet by mouth Daily. 90 tablet 3    nitroglycerin (NITROSTAT) 0.4 MG SL tablet Place 1 tablet under the tongue Every 5 (Five) Minutes As Needed for Chest Pain (Chest Pain With Systolic Blood Pressure Greater Than 100). 30 tablet 12    pantoprazole (PROTONIX) 40 MG EC tablet Take 1 tablet by mouth Daily. 90 tablet 2    QUEtiapine (SEROquel) 100 MG tablet TAKE 1 TABLET BY MOUTH EVERY DAY AT NIGHT 90 tablet 2    warfarin (Jantoven) 3 MG tablet TAKE 1 TABLET BY MOUTH EVERY DAY or as directed by MD depending on INR. 90 tablet 2    warfarin (COUMADIN) 5 MG tablet Take 1 tablet by  mouth Daily.       No current facility-administered medications for this visit.       Godfrey Shah had no medications administered during this visit.    No follow-ups on file.    There are no Patient Instructions on file for this visit.

## 2024-05-21 LAB
ALBUMIN SERPL-MCNC: 4.2 G/DL (ref 3.8–4.8)
ALBUMIN/CREAT UR: 11 MG/G CREAT (ref 0–29)
ALBUMIN/GLOB SERPL: 1.6 {RATIO} (ref 1.2–2.2)
ALP SERPL-CCNC: 76 IU/L (ref 44–121)
ALT SERPL-CCNC: 9 IU/L (ref 0–44)
AST SERPL-CCNC: 18 IU/L (ref 0–40)
BILIRUB SERPL-MCNC: 0.8 MG/DL (ref 0–1.2)
BUN SERPL-MCNC: 15 MG/DL (ref 8–27)
BUN/CREAT SERPL: 12 (ref 10–24)
CALCIUM SERPL-MCNC: 9 MG/DL (ref 8.6–10.2)
CHLORIDE SERPL-SCNC: 102 MMOL/L (ref 96–106)
CHOLEST SERPL-MCNC: 139 MG/DL (ref 100–199)
CHOLEST/HDLC SERPL: 3.9 RATIO (ref 0–5)
CO2 SERPL-SCNC: 27 MMOL/L (ref 20–29)
CREAT SERPL-MCNC: 1.28 MG/DL (ref 0.76–1.27)
CREAT UR-MCNC: 112.3 MG/DL
EGFRCR SERPLBLD CKD-EPI 2021: 57 ML/MIN/1.73
GLOBULIN SER CALC-MCNC: 2.6 G/DL (ref 1.5–4.5)
GLUCOSE SERPL-MCNC: 110 MG/DL (ref 70–99)
HBA1C MFR BLD: 6.1 % (ref 4.8–5.6)
HDLC SERPL-MCNC: 36 MG/DL
LDLC SERPL CALC-MCNC: 76 MG/DL (ref 0–99)
MICROALBUMIN UR-MCNC: 12.1 UG/ML
POTASSIUM SERPL-SCNC: 3.8 MMOL/L (ref 3.5–5.2)
PROT SERPL-MCNC: 6.8 G/DL (ref 6–8.5)
SODIUM SERPL-SCNC: 142 MMOL/L (ref 134–144)
TRIGL SERPL-MCNC: 158 MG/DL (ref 0–149)
VLDLC SERPL CALC-MCNC: 27 MG/DL (ref 5–40)

## 2024-08-30 ENCOUNTER — LAB (OUTPATIENT)
Dept: LAB | Facility: HOSPITAL | Age: 79
End: 2024-08-30
Payer: MEDICARE

## 2024-08-30 ENCOUNTER — TRANSCRIBE ORDERS (OUTPATIENT)
Dept: ADMINISTRATIVE | Facility: HOSPITAL | Age: 79
End: 2024-08-30
Payer: MEDICARE

## 2024-08-30 ENCOUNTER — HOSPITAL ENCOUNTER (OUTPATIENT)
Dept: CARDIOLOGY | Facility: HOSPITAL | Age: 79
Discharge: HOME | End: 2024-08-30
Payer: MEDICARE

## 2024-08-30 DIAGNOSIS — C61 MALIGNANT NEOPLASM OF PROSTATE: Primary | ICD-10-CM

## 2024-08-30 DIAGNOSIS — C61 MALIGNANT NEOPLASM OF PROSTATE: ICD-10-CM

## 2024-08-30 LAB
ANION GAP SERPL CALCULATED.3IONS-SCNC: 8.8 MMOL/L (ref 5–15)
BASOPHILS # BLD AUTO: 0.03 10*3/MM3 (ref 0–0.2)
BASOPHILS NFR BLD AUTO: 0.5 % (ref 0–1.5)
BUN SERPL-MCNC: 15 MG/DL (ref 8–23)
BUN/CREAT SERPL: 11.1 (ref 7–25)
CALCIUM SPEC-SCNC: 9 MG/DL (ref 8.6–10.5)
CHLORIDE SERPL-SCNC: 104 MMOL/L (ref 98–107)
CO2 SERPL-SCNC: 29.2 MMOL/L (ref 22–29)
CREAT SERPL-MCNC: 1.35 MG/DL (ref 0.76–1.27)
DEPRECATED RDW RBC AUTO: 48.1 FL (ref 37–54)
EGFRCR SERPLBLD CKD-EPI 2021: 53.4 ML/MIN/1.73
EOSINOPHIL # BLD AUTO: 0.12 10*3/MM3 (ref 0–0.4)
EOSINOPHIL NFR BLD AUTO: 1.9 % (ref 0.3–6.2)
ERYTHROCYTE [DISTWIDTH] IN BLOOD BY AUTOMATED COUNT: 13.4 % (ref 12.3–15.4)
GLUCOSE SERPL-MCNC: 123 MG/DL (ref 65–99)
HCT VFR BLD AUTO: 45.4 % (ref 37.5–51)
HGB BLD-MCNC: 15.6 G/DL (ref 13–17.7)
IMM GRANULOCYTES # BLD AUTO: 0.01 10*3/MM3 (ref 0–0.05)
IMM GRANULOCYTES NFR BLD AUTO: 0.2 % (ref 0–0.5)
LYMPHOCYTES # BLD AUTO: 2.21 10*3/MM3 (ref 0.7–3.1)
LYMPHOCYTES NFR BLD AUTO: 34.4 % (ref 19.6–45.3)
MCH RBC QN AUTO: 33.3 PG (ref 26.6–33)
MCHC RBC AUTO-ENTMCNC: 34.4 G/DL (ref 31.5–35.7)
MCV RBC AUTO: 97 FL (ref 79–97)
MONOCYTES # BLD AUTO: 0.58 10*3/MM3 (ref 0.1–0.9)
MONOCYTES NFR BLD AUTO: 9 % (ref 5–12)
NEUTROPHILS NFR BLD AUTO: 3.48 10*3/MM3 (ref 1.7–7)
NEUTROPHILS NFR BLD AUTO: 54 % (ref 42.7–76)
NRBC BLD AUTO-RTO: 0 /100 WBC (ref 0–0.2)
PLATELET # BLD AUTO: 122 10*3/MM3 (ref 140–450)
PMV BLD AUTO: 9.4 FL (ref 6–12)
POTASSIUM SERPL-SCNC: 3.9 MMOL/L (ref 3.5–5.2)
QT INTERVAL: 452 MS
QTC INTERVAL: 470 MS
RBC # BLD AUTO: 4.68 10*6/MM3 (ref 4.14–5.8)
SODIUM SERPL-SCNC: 142 MMOL/L (ref 136–145)
WBC NRBC COR # BLD AUTO: 6.43 10*3/MM3 (ref 3.4–10.8)

## 2024-08-30 PROCEDURE — 85025 COMPLETE CBC W/AUTO DIFF WBC: CPT

## 2024-08-30 PROCEDURE — 93005 ELECTROCARDIOGRAM TRACING: CPT | Performed by: UROLOGY

## 2024-08-30 PROCEDURE — 36415 COLL VENOUS BLD VENIPUNCTURE: CPT

## 2024-08-30 PROCEDURE — 80048 BASIC METABOLIC PNL TOTAL CA: CPT

## 2024-09-09 RX ORDER — PANTOPRAZOLE SODIUM 40 MG/1
40 TABLET, DELAYED RELEASE ORAL DAILY
Qty: 90 TABLET | Refills: 3 | Status: SHIPPED | OUTPATIENT
Start: 2024-09-09

## 2024-09-09 RX ORDER — QUETIAPINE FUMARATE 100 MG/1
TABLET, FILM COATED ORAL
Qty: 90 TABLET | Refills: 3 | Status: SHIPPED | OUTPATIENT
Start: 2024-09-09

## 2024-11-22 ENCOUNTER — TELEPHONE (OUTPATIENT)
Dept: FAMILY MEDICINE CLINIC | Facility: CLINIC | Age: 79
End: 2024-11-22
Payer: MEDICARE

## 2024-11-22 DIAGNOSIS — I25.10 CORONARY ARTERY DISEASE INVOLVING NATIVE CORONARY ARTERY OF NATIVE HEART WITHOUT ANGINA PECTORIS: ICD-10-CM

## 2024-11-22 RX ORDER — ATORVASTATIN CALCIUM 20 MG/1
20 TABLET, FILM COATED ORAL NIGHTLY
Qty: 90 TABLET | Refills: 3 | Status: SHIPPED | OUTPATIENT
Start: 2024-11-22

## 2024-11-22 NOTE — TELEPHONE ENCOUNTER
Caller: Godfrey Shah    Relationship: Self    Best call back number:     [phone] (Home)       Requested Prescriptions:   atorvastatin (LIPITOR) 20 MG tablet        Pharmacy where request should be sent:  [id number] [address] [facility] [phone] [facility] [phone]      Last office visit with prescribing clinician: 5/20/2024   Last telemedicine visit with prescribing clinician: Visit date not found   Next office visit with prescribing clinician: 11/27/2024     Additional details provided by patient: PATIENT CALLED TO REQUEST A MEDICATION REFILL ON MEDICATION. PATIENT HAS A 2 DAY SUPPLY LEFT.      Does the patient have less than a 3 day supply:  [] Yes  [x] No    Would you like a call back once the refill request has been completed: [] Yes [] No    If the office needs to give you a call back, can they leave a voicemail: [] Yes [] No    Chela Bustillos Rep   11/22/24 09:45 EST         THANKS

## 2024-11-27 ENCOUNTER — OFFICE VISIT (OUTPATIENT)
Dept: FAMILY MEDICINE CLINIC | Facility: CLINIC | Age: 79
End: 2024-11-27
Payer: MEDICARE

## 2024-11-27 VITALS
WEIGHT: 211.5 LBS | DIASTOLIC BLOOD PRESSURE: 92 MMHG | BODY MASS INDEX: 31.32 KG/M2 | SYSTOLIC BLOOD PRESSURE: 148 MMHG | HEART RATE: 76 BPM | RESPIRATION RATE: 17 BRPM | OXYGEN SATURATION: 95 % | HEIGHT: 69 IN | TEMPERATURE: 97.8 F

## 2024-11-27 DIAGNOSIS — E11.59 TYPE 2 DIABETES MELLITUS WITH OTHER CIRCULATORY COMPLICATION, WITHOUT LONG-TERM CURRENT USE OF INSULIN: ICD-10-CM

## 2024-11-27 DIAGNOSIS — Z00.00 MEDICARE ANNUAL WELLNESS VISIT, SUBSEQUENT: Primary | ICD-10-CM

## 2024-11-27 DIAGNOSIS — I50.22 CHRONIC SYSTOLIC CONGESTIVE HEART FAILURE: ICD-10-CM

## 2024-11-27 DIAGNOSIS — I25.10 CORONARY ARTERY DISEASE INVOLVING NATIVE CORONARY ARTERY OF NATIVE HEART WITHOUT ANGINA PECTORIS: ICD-10-CM

## 2024-11-27 PROBLEM — R23.2 HOT FLASHES: Status: ACTIVE | Noted: 2024-11-27

## 2024-11-27 PROBLEM — Z95.810 PRESENCE OF CARDIAC DEFIBRILLATOR: Status: ACTIVE | Noted: 2024-11-27

## 2024-11-27 PROCEDURE — 1170F FXNL STATUS ASSESSED: CPT | Performed by: FAMILY MEDICINE

## 2024-11-27 PROCEDURE — 3080F DIAST BP >= 90 MM HG: CPT | Performed by: FAMILY MEDICINE

## 2024-11-27 PROCEDURE — 3077F SYST BP >= 140 MM HG: CPT | Performed by: FAMILY MEDICINE

## 2024-11-27 PROCEDURE — 1126F AMNT PAIN NOTED NONE PRSNT: CPT | Performed by: FAMILY MEDICINE

## 2024-11-27 PROCEDURE — G0439 PPPS, SUBSEQ VISIT: HCPCS | Performed by: FAMILY MEDICINE

## 2024-11-27 PROCEDURE — 99214 OFFICE O/P EST MOD 30 MIN: CPT | Performed by: FAMILY MEDICINE

## 2024-11-27 RX ORDER — ATORVASTATIN CALCIUM 40 MG/1
40 TABLET, FILM COATED ORAL NIGHTLY
Qty: 90 TABLET | Refills: 3 | Status: SHIPPED | OUTPATIENT
Start: 2024-11-27

## 2024-11-27 RX ORDER — CLONIDINE HYDROCHLORIDE 0.1 MG/1
0.1 TABLET ORAL 2 TIMES DAILY
Qty: 60 TABLET | Refills: 11 | Status: SHIPPED | OUTPATIENT
Start: 2024-11-27

## 2024-11-27 RX ORDER — FUROSEMIDE 20 MG/1
TABLET ORAL
Qty: 30 TABLET | Refills: 5 | Status: SHIPPED | OUTPATIENT
Start: 2024-11-27

## 2024-11-27 NOTE — ASSESSMENT & PLAN NOTE
Orders:    furosemide (Lasix) 20 MG tablet; One a day prn chest congestion or swelling in legs.  CHF. Uncontrolled.  Start furosemide 20 a day prn swelling.

## 2024-11-27 NOTE — ASSESSMENT & PLAN NOTE
Orders:    Comprehensive Metabolic Panel    Lipid Panel With / Chol / HDL Ratio    Hemoglobin A1c  Hot flashes.  Uncontrolled.  Due to hormonal tx.  Start clonidine 0.1 BID.  Hyperlipidmeia with CAD.  Uncontrolled.  Increase atorva to 40 a day.

## 2024-11-27 NOTE — PROGRESS NOTES
Subjective   The ABCs of the Annual Wellness Visit  Medicare Wellness Visit      Godfrey Shah is a 79 y.o. patient who presents for a Medicare Wellness Visit.    The following portions of the patient's history were reviewed and   updated as appropriate: allergies, current medications, past family history, past medical history, past social history, past surgical history, and problem list.    Compared to one year ago, the patient's physical   health is the same.  Compared to one year ago, the patient's mental   health is the same.    Recent Hospitalizations:  He was not admitted to the hospital during the last year.     Current Medical Providers:  Patient Care Team:  Godfrey Jackson MD as PCP - General (Family Medicine)  Hanane Perez PA-C as Physician Assistant (Colon and Rectal Surgery)  Jean Pierre Diaz MD as Consulting Physician (Gastroenterology)  Godfrey Rogers MD as Consulting Physician (Dermatology)  Jed Neville Jr., MD as Consulting Physician (Urology)  Lindy Cui MD (Inactive) as Consulting Physician (Ophthalmology)  Rajinder Naidu MD as Consulting Physician (Sleep Medicine)    Outpatient Medications Prior to Visit   Medication Sig Dispense Refill    albuterol sulfate HFA (Ventolin HFA) 108 (90 Base) MCG/ACT inhaler Inhale 2 puffs Every 4 (Four) Hours As Needed for Wheezing. 18 g 3    aspirin (aspirin) 81 MG EC tablet Take 1 tablet by mouth Daily.      atorvastatin (LIPITOR) 20 MG tablet Take 1 tablet by mouth Every Night. 90 tablet 3    cetirizine (ZyrTEC) 10 MG tablet Take 1 tablet by mouth Daily.      latanoprost (XALATAN) 0.005 % ophthalmic solution instill 1 drop by ophthalmic route  every day into both eye(s) every day the evening      losartan-hydrochlorothiazide (Hyzaar) 100-25 MG per tablet Take 1 tablet by mouth Daily.      metoprolol succinate XL (TOPROL-XL) 25 MG 24 hr tablet Take 1 tablet by mouth Daily. 90 tablet 3    nitroglycerin (NITROSTAT) 0.4 MG SL tablet  Place 1 tablet under the tongue Every 5 (Five) Minutes As Needed for Chest Pain (Chest Pain With Systolic Blood Pressure Greater Than 100). 30 tablet 12    pantoprazole (PROTONIX) 40 MG EC tablet TAKE 1 TABLET BY MOUTH EVERY DAY 90 tablet 3    QUEtiapine (SEROquel) 100 MG tablet TAKE 1 TABLET BY MOUTH EVERY DAY AT NIGHT 90 tablet 3    warfarin (COUMADIN) 5 MG tablet Take 1 tablet by mouth Daily.      warfarin (Jantoven) 3 MG tablet TAKE 1 TABLET BY MOUTH EVERY DAY or as directed by MD depending on INR. 90 tablet 2    lactulose (CHRONULAC) 10 GM/15ML solution Take 15 mL by mouth 2 (Two) Times a Day. (Patient not taking: Reported on 11/27/2024) 900 mL 0    meclizine (ANTIVERT) 25 MG tablet TAKE 1 TABLET BY MOUTH THREE TIMES A DAY AS NEEDED FOR DIZZINESS. (Patient not taking: Reported on 11/27/2024) 30 tablet 0     No facility-administered medications prior to visit.     No opioid medication identified on active medication list. I have reviewed chart for other potential  high risk medication/s and harmful drug interactions in the elderly.      Aspirin is on active medication list. Aspirin use is indicated based on review of current medical condition/s. Pros and cons of this therapy have been discussed today. Benefits of this medication outweigh potential harm.  Patient has been encouraged to continue taking this medication.  .      Patient Active Problem List   Diagnosis    Bradycardia, sinus    Sleep-disordered breathing    Symptomatic bradycardia    AV block    Prostate cancer    Pacemaker    Insomnia    RYAN (obstructive sleep apnea)    Nephrolithiasis    Hyperglycemia    GERD (gastroesophageal reflux disease)    Osteoarthritis    COPD (chronic obstructive pulmonary disease)    Inability to attain erection    Coronary artery disease    Visit for suture removal    Medicare annual wellness visit, subsequent    Mixed hyperlipidemia    COPD with acute exacerbation    Essential hypertension    Paroxysmal atrial  "fibrillation    Other age-related cataract    High risk medication use    Asymptomatic microscopic hematuria    Type 2 diabetes mellitus with circulatory disorder, without long-term current use of insulin    Other hemorrhoids    Benign paroxysmal positional vertigo    First degree heart block    Anal fissure    Rectal pain    Personal history of colonic polyps    Right rotator cuff tendonitis    Presence of cardiac defibrillator    Hot flashes    Chronic systolic congestive heart failure     Advance Care Planning Advance Directive is on file.  ACP discussion was held with the patient during this visit. Patient has an advance directive in EMR which is still valid.             Objective   Vitals:    24 0953   BP: 148/92   BP Location: Left arm   Patient Position: Sitting   Cuff Size: Adult   Pulse: 76   Resp: 17   Temp: 97.8 °F (36.6 °C)   TempSrc: Temporal   SpO2: 95%   Weight: 95.9 kg (211 lb 8 oz)   Height: 175.3 cm (69.02\")       Estimated body mass index is 31.22 kg/m² as calculated from the following:    Height as of this encounter: 175.3 cm (69.02\").    Weight as of this encounter: 95.9 kg (211 lb 8 oz).            Does the patient have evidence of cognitive impairment? No                                                                                                Health  Risk Assessment    Smoking Status:  Social History     Tobacco Use   Smoking Status Former    Current packs/day: 0.00    Average packs/day: 1 pack/day for 47.0 years (47.0 ttl pk-yrs)    Types: Cigarettes    Start date: 10/1952    Quit date: 10/1999    Years since quittin.1    Passive exposure: Past   Smokeless Tobacco Never   Tobacco Comments    Quit      Alcohol Consumption:  Social History     Substance and Sexual Activity   Alcohol Use No       Fall Risk Screen  STEADI Fall Risk Assessment was completed, and patient is at LOW risk for falls.Assessment completed on:2024    Depression Screening   Little interest or " pleasure in doing things? Not at all   Feeling down, depressed, or hopeless? Several days   PHQ-2 Total Score 1      Health Habits and Functional and Cognitive Screenin/27/2024     9:59 AM   Functional & Cognitive Status   Do you have difficulty preparing food and eating? No   Do you have difficulty bathing yourself, getting dressed or grooming yourself? No   Do you have difficulty using the toilet? No   Do you have difficulty moving around from place to place? No   Do you have trouble with steps or getting out of a bed or a chair? No   Current Diet Well Balanced Diet   Dental Exam Up to date   Eye Exam Up to date   Exercise (times per week) 3 times per week   Current Exercises Include Walking   Do you need help using the phone?  No   Are you deaf or do you have serious difficulty hearing?  No   Do you need help to go to places out of walking distance? No   Do you need help shopping? No   Do you need help preparing meals?  No   Do you need help with housework?  No   Do you need help taking your medications? No   Do you need help managing money? No   Do you ever drive or ride in a car without wearing a seat belt? No   Have you felt unusual stress, anger or loneliness in the last month? No   Who do you live with? Spouse   If you need help, do you have trouble finding someone available to you? No   Have you been bothered in the last four weeks by sexual problems? No   Do you have difficulty concentrating, remembering or making decisions? No           Age-appropriate Screening Schedule:  Refer to the list below for future screening recommendations based on patient's age, sex and/or medical conditions. Orders for these recommended tests are listed in the plan section. The patient has been provided with a written plan.    Health Maintenance List  Health Maintenance   Topic Date Due    DIABETIC EYE EXAM  2023    HEMOGLOBIN A1C  2024    LIPID PANEL  2025    ANNUAL WELLNESS VISIT  2025     BMI FOLLOWUP  11/27/2025    TDAP/TD VACCINES (2 - Tdap) 06/16/2026    COLORECTAL CANCER SCREENING  09/21/2033    HEPATITIS C SCREENING  Completed    COVID-19 Vaccine  Completed    RSV Vaccine - Adults  Completed    INFLUENZA VACCINE  Completed    Pneumococcal Vaccine 65+  Completed    ZOSTER VACCINE  Completed    URINE MICROALBUMIN  Discontinued                                                                                                                                                CMS Preventative Services Quick Reference  Risk Factors Identified During Encounter  Inactivity/Sedentary: Patient was advised to exercise at least 150 minutes a week per CDC recommendations.    The above risks/problems have been discussed with the patient.  Pertinent information has been shared with the patient in the After Visit Summary.  An After Visit Summary and PPPS were made available to the patient.    Follow Up:   Next Medicare Wellness visit to be scheduled in 1 year.         Additional E&M Note during same encounter follows:  Patient has additional, significant, and separately identifiable condition(s)/problem(s) that require work above and beyond the Medicare Wellness Visit     Chief Complaint  Medicare Wellness-subsequent    Subjective   HPI  Godfrey is also being seen today for additional medical problem/s.    C/o hot flashes with initiation of hormonally based prostate ca treatment.      Review of Systems   Constitutional:  Negative for chills, diaphoresis and fatigue.   HENT:  Negative for rhinorrhea.    Respiratory:  Negative for cough and shortness of breath.    Cardiovascular:  Negative for chest pain and leg swelling.   Gastrointestinal:  Negative for abdominal distention and abdominal pain.   Musculoskeletal:  Negative for arthralgias and back pain.   Skin:  Negative for rash.              Objective   Vital Signs:  /92 (BP Location: Left arm, Patient Position: Sitting, Cuff Size: Adult)   Pulse 76   Temp  "97.8 °F (36.6 °C) (Temporal)   Resp 17   Ht 175.3 cm (69.02\")   Wt 95.9 kg (211 lb 8 oz)   SpO2 95%   BMI 31.22 kg/m²   Physical Exam  Vitals reviewed.   Constitutional:       Appearance: He is well-developed. He is not diaphoretic.   HENT:      Head: Normocephalic and atraumatic.   Eyes:      General: No scleral icterus.     Pupils: Pupils are equal, round, and reactive to light.   Neck:      Thyroid: No thyromegaly.   Cardiovascular:      Rate and Rhythm: Normal rate and regular rhythm.      Heart sounds: No murmur heard.     No friction rub. No gallop.   Pulmonary:      Effort: Pulmonary effort is normal. No respiratory distress.      Breath sounds: Rales present. No wheezing.      Comments: Crackles b at bases  Chest:      Chest wall: No tenderness.   Abdominal:      General: Bowel sounds are normal. There is no distension.      Palpations: Abdomen is soft.      Tenderness: There is no abdominal tenderness.   Musculoskeletal:         General: No deformity. Normal range of motion.   Lymphadenopathy:      Cervical: No cervical adenopathy.   Skin:     General: Skin is warm and dry.      Findings: No rash.   Neurological:      Cranial Nerves: No cranial nerve deficit.      Motor: No abnormal muscle tone.                       Assessment and Plan            Coronary artery disease involving native coronary artery of native heart without angina pectoris      Orders:    atorvastatin (LIPITOR) 40 MG tablet; Take 1 tablet by mouth Every Night.    Medicare annual wellness visit, subsequent         Type 2 diabetes mellitus with other circulatory complication, without long-term current use of insulin      Orders:    Comprehensive Metabolic Panel    Lipid Panel With / Chol / HDL Ratio    Hemoglobin A1c  Hot flashes.  Uncontrolled.  Due to hormonal tx.  Start clonidine 0.1 BID.  Hyperlipidmeia with CAD.  Uncontrolled.  Increase atorva to 40 a day.      Chronic systolic congestive heart failure          Orders:    " furosemide (Lasix) 20 MG tablet; One a day prn chest congestion or swelling in legs.  CHF. Uncontrolled.  Start furosemide 20 a day prn swelling.     Preventive Counseling:  Encouraged to stay active.  Covid vaccine UTD.  Flu utd.  Pneumovax UTD.  Colonoscopy UTD.    Dentist UTD.  Optho UTD.           Follow Up   Return in about 4 months (around 3/27/2025).  Patient was given instructions and counseling regarding his condition or for health maintenance advice. Please see specific information pulled into the AVS if appropriate.

## 2024-11-28 LAB
ALBUMIN SERPL-MCNC: 3.9 G/DL (ref 3.5–5.2)
ALBUMIN/GLOB SERPL: 1.4 G/DL
ALP SERPL-CCNC: 115 U/L (ref 39–117)
ALT SERPL-CCNC: 25 U/L (ref 1–41)
AST SERPL-CCNC: 27 U/L (ref 1–40)
BILIRUB SERPL-MCNC: 0.6 MG/DL (ref 0–1.2)
BUN SERPL-MCNC: 24 MG/DL (ref 8–23)
BUN/CREAT SERPL: 20.9 (ref 7–25)
CALCIUM SERPL-MCNC: 9 MG/DL (ref 8.6–10.5)
CHLORIDE SERPL-SCNC: 105 MMOL/L (ref 98–107)
CHOLEST SERPL-MCNC: 158 MG/DL (ref 0–200)
CHOLEST/HDLC SERPL: 3.59 {RATIO}
CO2 SERPL-SCNC: 30.2 MMOL/L (ref 22–29)
CREAT SERPL-MCNC: 1.15 MG/DL (ref 0.76–1.27)
EGFRCR SERPLBLD CKD-EPI 2021: 64.7 ML/MIN/1.73
GLOBULIN SER CALC-MCNC: 2.8 GM/DL
GLUCOSE SERPL-MCNC: 109 MG/DL (ref 65–99)
HBA1C MFR BLD: 6.2 % (ref 4.8–5.6)
HDLC SERPL-MCNC: 44 MG/DL (ref 40–60)
LDLC SERPL CALC-MCNC: 91 MG/DL (ref 0–100)
POTASSIUM SERPL-SCNC: 4.1 MMOL/L (ref 3.5–5.2)
PROT SERPL-MCNC: 6.7 G/DL (ref 6–8.5)
SODIUM SERPL-SCNC: 143 MMOL/L (ref 136–145)
TRIGL SERPL-MCNC: 129 MG/DL (ref 0–150)
VLDLC SERPL CALC-MCNC: 23 MG/DL (ref 5–40)

## 2024-12-16 ENCOUNTER — TELEPHONE (OUTPATIENT)
Dept: FAMILY MEDICINE CLINIC | Facility: CLINIC | Age: 79
End: 2024-12-16
Payer: MEDICARE

## 2024-12-16 RX ORDER — VENLAFAXINE HYDROCHLORIDE 37.5 MG/1
37.5 CAPSULE, EXTENDED RELEASE ORAL DAILY
Qty: 90 CAPSULE | Refills: 1 | Status: SHIPPED | OUTPATIENT
Start: 2024-12-16

## 2024-12-16 NOTE — TELEPHONE ENCOUNTER
Pt states the clonidine is not working at all for his hot flashes and he wants to know what to do now.  Please advise      [facility]

## 2024-12-16 NOTE — TELEPHONE ENCOUNTER
Caller: Godfrey Shah    Relationship: Self    Best call back number:[phone]     What is the best time to reach you: ANYTIME    Who are you requesting to speak with (clinical staff, provider,  specific staff member):CLINICAL    What was the call regarding: PATIENT CALLING WANTING TO GET CLARIFICATION ON HOW LONG HE IS NEEDING TO TAKE CLONIDINE AND FUROSEMIDE.    Is it okay if the provider responds through MyChart: NO

## 2025-03-25 ENCOUNTER — OFFICE VISIT (OUTPATIENT)
Dept: FAMILY MEDICINE CLINIC | Facility: CLINIC | Age: 80
End: 2025-03-25
Payer: MEDICARE

## 2025-03-25 VITALS
DIASTOLIC BLOOD PRESSURE: 82 MMHG | HEART RATE: 83 BPM | WEIGHT: 211 LBS | SYSTOLIC BLOOD PRESSURE: 134 MMHG | TEMPERATURE: 97.3 F | BODY MASS INDEX: 31.25 KG/M2 | RESPIRATION RATE: 17 BRPM | OXYGEN SATURATION: 95 % | HEIGHT: 69 IN

## 2025-03-25 DIAGNOSIS — R23.2 HOT FLASHES: ICD-10-CM

## 2025-03-25 DIAGNOSIS — I50.22 CHRONIC SYSTOLIC CONGESTIVE HEART FAILURE: ICD-10-CM

## 2025-03-25 DIAGNOSIS — E11.59 TYPE 2 DIABETES MELLITUS WITH OTHER CIRCULATORY COMPLICATION, WITHOUT LONG-TERM CURRENT USE OF INSULIN: ICD-10-CM

## 2025-03-25 DIAGNOSIS — E78.2 MIXED HYPERLIPIDEMIA: ICD-10-CM

## 2025-03-25 DIAGNOSIS — I10 ESSENTIAL HYPERTENSION: Primary | ICD-10-CM

## 2025-03-25 RX ORDER — QUETIAPINE FUMARATE 100 MG/1
100 TABLET, FILM COATED ORAL
Qty: 90 TABLET | Refills: 3 | Status: SHIPPED | OUTPATIENT
Start: 2025-03-25

## 2025-03-25 RX ORDER — FUROSEMIDE 20 MG/1
TABLET ORAL
Start: 2025-03-25

## 2025-03-25 NOTE — PROGRESS NOTES
Chief Complaint   Patient presents with    Congestive Heart Failure    Coronary Artery Disease    Diabetes    Hypertension    Hyperlipidemia       Subjective   Gdofrey Shah is a 79 y.o. male.     Congestive Heart Failure  Pertinent negatives include no abdominal pain, chest pain, fatigue or shortness of breath. His past medical history is significant for CAD.   Coronary Artery Disease  Pertinent negatives include no chest pain, leg swelling or shortness of breath. Risk factors include hyperlipidemia. His past medical history is significant for CHF.   Diabetes  Pertinent negatives for hypoglycemia include no confusion. Pertinent negatives for diabetes include no blurred vision, no chest pain, no fatigue, no polyuria and no weakness.   Hypertension  Pertinent negatives include no blurred vision, chest pain or shortness of breath.   Hyperlipidemia  Pertinent negatives include no chest pain, myalgias or shortness of breath.      F/U HTN.  Doing well with meds.   C/o hot flashes.  Still present.  On leupron every six months.    The following portions of the patient's history were reviewed and updated as appropriate: allergies, current medications, past family history, past medical history, past social history, past surgical history and problem list.    Review of Systems   Constitutional:  Negative for appetite change and fatigue.   HENT:  Negative for nosebleeds and sore throat.    Eyes:  Negative for blurred vision and visual disturbance.   Respiratory:  Negative for shortness of breath and wheezing.    Cardiovascular:  Negative for chest pain and leg swelling.   Gastrointestinal:  Negative for abdominal distention and abdominal pain.   Endocrine: Negative for cold intolerance and polyuria.   Genitourinary:  Negative for dysuria and hematuria.   Musculoskeletal:  Negative for arthralgias and myalgias.   Skin:  Negative for color change and rash.   Neurological:  Negative for weakness and confusion.    Psychiatric/Behavioral:  Negative for agitation and depressed mood.        Patient Active Problem List   Diagnosis    Bradycardia, sinus    Sleep-disordered breathing    Symptomatic bradycardia    AV block    Prostate cancer    Pacemaker    Insomnia    RYAN (obstructive sleep apnea)    Nephrolithiasis    Hyperglycemia    GERD (gastroesophageal reflux disease)    Osteoarthritis    COPD (chronic obstructive pulmonary disease)    Inability to attain erection    Coronary artery disease    Visit for suture removal    Medicare annual wellness visit, subsequent    Mixed hyperlipidemia    COPD with acute exacerbation    Essential hypertension    Paroxysmal atrial fibrillation    Other age-related cataract    High risk medication use    Asymptomatic microscopic hematuria    Type 2 diabetes mellitus with circulatory disorder, without long-term current use of insulin    Other hemorrhoids    Benign paroxysmal positional vertigo    First degree heart block    Anal fissure    Rectal pain    Personal history of colonic polyps    Right rotator cuff tendonitis    Presence of cardiac defibrillator    Hot flashes    Chronic systolic congestive heart failure       No Known Allergies      Current Outpatient Medications:     albuterol sulfate HFA (Ventolin HFA) 108 (90 Base) MCG/ACT inhaler, Inhale 2 puffs Every 4 (Four) Hours As Needed for Wheezing., Disp: 18 g, Rfl: 3    aspirin (aspirin) 81 MG EC tablet, Take 1 tablet by mouth Daily., Disp: , Rfl:     atorvastatin (LIPITOR) 40 MG tablet, Take 1 tablet by mouth Every Night., Disp: 90 tablet, Rfl: 3    cetirizine (ZyrTEC) 10 MG tablet, Take 1 tablet by mouth Daily., Disp: , Rfl:     furosemide (Lasix) 20 MG tablet, One a day prn chest congestion or swelling in legs., Disp: 30 tablet, Rfl: 5    latanoprost (XALATAN) 0.005 % ophthalmic solution, instill 1 drop by ophthalmic route  every day into both eye(s) every day the evening, Disp: , Rfl:     Leuprolide Mesylate, 6 Month, 42 MG  prefilled syringe, Inject  under the skin into the appropriate area as directed., Disp: , Rfl:     losartan-hydrochlorothiazide (Hyzaar) 100-25 MG per tablet, Take 1 tablet by mouth Daily., Disp: , Rfl:     metoprolol succinate XL (TOPROL-XL) 25 MG 24 hr tablet, Take 1 tablet by mouth Daily., Disp: 90 tablet, Rfl: 3    nitroglycerin (NITROSTAT) 0.4 MG SL tablet, Place 1 tablet under the tongue Every 5 (Five) Minutes As Needed for Chest Pain (Chest Pain With Systolic Blood Pressure Greater Than 100)., Disp: 30 tablet, Rfl: 12    pantoprazole (PROTONIX) 40 MG EC tablet, TAKE 1 TABLET BY MOUTH EVERY DAY, Disp: 90 tablet, Rfl: 3    QUEtiapine (SEROquel) 100 MG tablet, TAKE 1 TABLET BY MOUTH EVERY DAY AT NIGHT, Disp: 90 tablet, Rfl: 3    venlafaxine XR (Effexor XR) 37.5 MG 24 hr capsule, Take 1 capsule by mouth Daily., Disp: 90 capsule, Rfl: 1    warfarin (COUMADIN) 5 MG tablet, Take 1 tablet by mouth Daily., Disp: , Rfl:     warfarin (Jantoven) 3 MG tablet, TAKE 1 TABLET BY MOUTH EVERY DAY or as directed by MD depending on INR., Disp: 90 tablet, Rfl: 2    Past Medical History:   Diagnosis Date    Abnormal CXR 09/2016    Alcohol use 08/18/2016    SEEN AT [facility] ER    Allergic rhinitis     Anal fissure 07/26/2023    Anticoagulant long-term use     Anxiety     Arm mass, right     ASCVD (arteriosclerotic cardiovascular disease)     Asthma     MILD, INTERMITTENT    AV block, complete     BCC (basal cell carcinoma) 10/2020    OF TRUNK, FOLLOWED BY DR. GUZMAN RIVERS    Biceps muscle tear 03/01/2017    BPH (benign prostatic hyperplasia)     FOLLOWED BY DR. BENNY OVALLE    Bradycardia 01/2022    CAD (coronary artery disease)     Cardiac pacemaker in situ     Cataract     BILATERAL    Chronic constipation     Colon polyps     FOLLOWED BY DR. ELLIE KUMAR    COPD (chronic obstructive pulmonary disease)     FOLLOWED BY DR. ALAN COLE    Cyst of kidney, acquired     Dacryoadenitis of both lacrimal glands     FOLLOWED BY DR. JADA TIJERINA  SUMMERS    Depression     Diplopia     Eczema     ED (erectile dysfunction)     Gastroduodenitis 11/2017    GERD (gastroesophageal reflux disease)     Glaucoma     BILATERAL    Hematuria     Hemorrhoids     Hyperglycemia     Hyperlipidemia     MIXED HLD    Hypertension     Inability to attain erection     Insect bite of right upper arm 09/07/2017    Insomnia     Kidney stones     Laceration of foot 10/16/2019    LEFT FOOT, SEEN AT [facility] ER    Left kidney mass     BEING FOLLOWED BY DR. 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    Pleural effusion 06/2019    Pneumothorax 04/2019    Prostate cancer 05/2017    Pulmonary infiltrate 06/2018    Rectal bleeding 12/2022    WITH RECTAL PAIN    Regular astigmatism of left eye     Rotator cuff tendonitis, right     Seborrheic keratosis     Snoring     SSS (sick sinus syndrome)     Syncope and collapse 09/2020    Tachycardia 07/2022    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    Urinary incontinence 01/2022    Vertigo 05/02/2014    SEEN AT [facility]       Past Surgical History:   Procedure Laterality Date    CARDIAC CATHETERIZATION N/A 04/17/2019    Procedure: LEFT HEART CATH;  Surgeon: Lalo Naranjo MD;  Location:  [facility];  Service: Cardiovascular    CARDIAC CATHETERIZATION N/A 04/17/2019    Procedure: CORONARY ANGIOGRAPHY;  Surgeon: Lalo Naranjo MD;  Location:  [facility];  Service: Cardiovascular    CARDIAC CATHETERIZATION N/A 04/17/2019    Procedure: Left ventriculography;  Surgeon: Lalo Naranjo MD;  Location:  [facility];  Service: Cardiovascular    CARDIAC ELECTROPHYSIOLOGY PROCEDURE Left 04/18/2019    Procedure: Pacemaker DC new;   Surgeon: Curtis Moore DO;  Location: [facility] INVASIVE LOCATION;  Service: Cardiovascular    COLONOSCOPY N/A 2017    COLONOSCOPY N/A     WNL    COLONOSCOPY N/A 2023    DIVERTICULOSIS IN SIGMOID, MODERATE HEMORRHOIDS, ANAL FISSURE- INJECTED WITH BOTOX, RESCOPE IN 5 YRS, DR. HARDEEP POOL AT [facility]    COLONOSCOPY W/ POLYPECTOMY N/A 2011    POLYPS, RESCOPE IN 5 YRS    CORONARY ANGIOPLASTY WITH STENT PLACEMENT      CYST REMOVAL      CYSTOSCOPY RETROGRADE PYELOGRAM N/A 2014    DR. YOHANA REIS AT [place]    ENDOSCOPY N/A 2003    DUODENAL POLYPS X3, PATH: MILD CHRONIC DUODENITIS, DR. KRISHNA CHÁVEZ AT [facility]    LACERATION REPAIR Left 10/16/2019    LEFT FOOT, DONE AT [facility] ER    PENILE PROSTHESIS IMPLANT N/A     PROSTATE BIOPSY Bilateral 2015    NEEDLE BIOPSY, WNL    RECTAL BOTOX INJECTION N/A 2023    ANAL FISSURE INJECTED WITH 70 UNITS OF BOTOX, DR. HARDEEP POOL AT [facility]    VASECTOMY N/A     - ?       Family History   Problem Relation Age of Onset    No Known Problems Mother     Heart disease Father     Coronary artery disease Father     Heart disease Sister     Depression Sister     Depression Brother     Heart disease Brother     Malig Hyperthermia Neg Hx        Social History     Tobacco Use    Smoking status: Former     Current packs/day: 0.00     Average packs/day: 1 pack/day for 47.0 years (47.0 ttl pk-yrs)     Types: Cigarettes     Start date: 10/1952     Quit date: 10/1999     Years since quittin.4     Passive exposure: Past    Smokeless tobacco: Never    Tobacco comments:     Quit    Substance Use Topics    Alcohol use: No            Objective     Vitals:    25 1305   BP: 134/82   Pulse: 83   Resp: 17   Temp: 97.3 °F (36.3 °C)   SpO2: 95%     Body mass index is 31.14 kg/m².    Physical Exam  Vitals reviewed.   Constitutional:       Appearance: He is well-developed. He is not diaphoretic.   HENT:      Head: Normocephalic and atraumatic.   Eyes:       General: No scleral icterus.     Pupils: Pupils are equal, round, and reactive to light.   Neck:      Thyroid: No thyromegaly.   Cardiovascular:      Rate and Rhythm: Normal rate and regular rhythm.      Heart sounds: No murmur heard.     No friction rub. No gallop.   Pulmonary:      Effort: Pulmonary effort is normal. No respiratory distress.      Breath sounds: No wheezing or rales.   Chest:      Chest wall: No tenderness.   Abdominal:      General: Bowel sounds are normal. There is no distension.      Palpations: Abdomen is soft.      Tenderness: There is no abdominal tenderness.   Musculoskeletal:         General: No deformity. Normal range of motion.   Lymphadenopathy:      Cervical: No cervical adenopathy.   Skin:     General: Skin is warm and dry.      Findings: No rash.   Neurological:      Cranial Nerves: No cranial nerve deficit.      Motor: No abnormal muscle tone.         Lab Results   Component Value Date    GLUCOSE 109 (H) 11/27/2024    BUN 24 (H) 11/27/2024    CREATININE 1.15 11/27/2024    EGFRIFNONA 61 11/11/2021    EGFRIFAFRI 71 11/11/2021    BCR 20.9 11/27/2024    K 4.1 11/27/2024    CO2 30.2 (H) 11/27/2024    CALCIUM 9.0 11/27/2024    ALBUMIN 3.9 11/27/2024    AST 27 11/27/2024    ALT 25 11/27/2024       WBC   Date Value Ref Range Status   08/30/2024 6.43 3.40 - 10.80 10*3/mm3 Final   11/16/2022 5.18 3.40 - 10.80 10*3/mm3 Final     RBC   Date Value Ref Range Status   08/30/2024 4.68 4.14 - 5.80 10*6/mm3 Final   11/16/2022 4.76 4.14 - 5.80 10*6/mm3 Final     Hemoglobin   Date Value Ref Range Status   08/30/2024 15.6 13.0 - 17.7 g/dL Final     Hematocrit   Date Value Ref Range Status   08/30/2024 45.4 37.5 - 51.0 % Final     MCV   Date Value Ref Range Status   08/30/2024 97.0 79.0 - 97.0 fL Final     MCH   Date Value Ref Range Status   08/30/2024 33.3 (H) 26.6 - 33.0 pg Final     MCHC   Date Value Ref Range Status   08/30/2024 34.4 31.5 - 35.7 g/dL Final     RDW   Date Value Ref Range Status    08/30/2024 13.4 12.3 - 15.4 % Final     RDW-SD   Date Value Ref Range Status   08/30/2024 48.1 37.0 - 54.0 fl Final     MPV   Date Value Ref Range Status   08/30/2024 9.4 6.0 - 12.0 fL Final     Platelets   Date Value Ref Range Status   08/30/2024 122 (L) 140 - 450 10*3/mm3 Final     Neutrophil %   Date Value Ref Range Status   08/30/2024 54.0 42.7 - 76.0 % Final     Lymphocyte %   Date Value Ref Range Status   08/30/2024 34.4 19.6 - 45.3 % Final     Monocyte %   Date Value Ref Range Status   08/30/2024 9.0 5.0 - 12.0 % Final     Eosinophil %   Date Value Ref Range Status   08/30/2024 1.9 0.3 - 6.2 % Final     Basophil %   Date Value Ref Range Status   08/30/2024 0.5 0.0 - 1.5 % Final     Immature Grans %   Date Value Ref Range Status   08/30/2024 0.2 0.0 - 0.5 % Final     Neutrophils, Absolute   Date Value Ref Range Status   08/30/2024 3.48 1.70 - 7.00 10*3/mm3 Final     Lymphocytes, Absolute   Date Value Ref Range Status   08/30/2024 2.21 0.70 - 3.10 10*3/mm3 Final     Monocytes, Absolute   Date Value Ref Range Status   08/30/2024 0.58 0.10 - 0.90 10*3/mm3 Final     Eosinophils, Absolute   Date Value Ref Range Status   08/30/2024 0.12 0.00 - 0.40 10*3/mm3 Final     Basophils, Absolute   Date Value Ref Range Status   08/30/2024 0.03 0.00 - 0.20 10*3/mm3 Final     Immature Grans, Absolute   Date Value Ref Range Status   08/30/2024 0.01 0.00 - 0.05 10*3/mm3 Final     nRBC   Date Value Ref Range Status   08/30/2024 0.0 0.0 - 0.2 /100 WBC Final       Lab Results   Component Value Date    HGBA1C 6.20 (H) 11/27/2024       Lab Results   Component Value Date    BNSMVFIA03 239 11/11/2021       TSH   Date Value Ref Range Status   04/21/2022 1.360 0.450 - 4.500 uIU/mL Final   04/15/2019 1.030 0.270 - 4.200 mIU/mL Final       Lab Results   Component Value Date    CHOL 149 04/18/2019     Lab Results   Component Value Date    TRIG 129 11/27/2024     Lab Results   Component Value Date    HDL 44 11/27/2024     Lab Results    Component Value Date    LDL 91 11/27/2024     Lab Results   Component Value Date    VLDL 23 11/27/2024     Lab Results   Component Value Date    LDLHDL 2.84 04/18/2019         Procedures    Assessment & Plan   Problems Addressed this Visit       Mixed hyperlipidemia    Essential hypertension - Primary    Type 2 diabetes mellitus with circulatory disorder, without long-term current use of insulin    Hot flashes    Chronic systolic congestive heart failure     Diagnoses         Codes Comments      Essential hypertension    -  Primary ICD-10-CM: I10  ICD-9-CM: 401.9       Hot flashes     ICD-10-CM: R23.2  ICD-9-CM: 782.62       Type 2 diabetes mellitus with other circulatory complication, without long-term current use of insulin     ICD-10-CM: E11.59  ICD-9-CM: 250.70       Mixed hyperlipidemia     ICD-10-CM: E78.2  ICD-9-CM: 272.2       Chronic systolic congestive heart failure     ICD-10-CM: I50.22  ICD-9-CM: 428.22, 428.0           Hyperlipidemia.  LDL uncontrolled.  Contnue atorva.  Check FLP. CMP.    Hot flashes . Uncontrolled.  Increase venlafaxine ER to 75 a day.    HTN.  Controlled.  Contnue meds.    DM2.  Controlled.  Check A1c, urine micro.    No orders of the defined types were placed in this encounter.      Current Outpatient Medications   Medication Sig Dispense Refill    albuterol sulfate HFA (Ventolin HFA) 108 (90 Base) MCG/ACT inhaler Inhale 2 puffs Every 4 (Four) Hours As Needed for Wheezing. 18 g 3    aspirin (aspirin) 81 MG EC tablet Take 1 tablet by mouth Daily.      atorvastatin (LIPITOR) 40 MG tablet Take 1 tablet by mouth Every Night. 90 tablet 3    cetirizine (ZyrTEC) 10 MG tablet Take 1 tablet by mouth Daily.      furosemide (Lasix) 20 MG tablet One a day prn chest congestion or swelling in legs. 30 tablet 5    latanoprost (XALATAN) 0.005 % ophthalmic solution instill 1 drop by ophthalmic route  every day into both eye(s) every day the evening      Leuprolide Mesylate, 6 Month, 42 MG  prefilled syringe Inject  under the skin into the appropriate area as directed.      losartan-hydrochlorothiazide (Hyzaar) 100-25 MG per tablet Take 1 tablet by mouth Daily.      metoprolol succinate XL (TOPROL-XL) 25 MG 24 hr tablet Take 1 tablet by mouth Daily. 90 tablet 3    nitroglycerin (NITROSTAT) 0.4 MG SL tablet Place 1 tablet under the tongue Every 5 (Five) Minutes As Needed for Chest Pain (Chest Pain With Systolic Blood Pressure Greater Than 100). 30 tablet 12    pantoprazole (PROTONIX) 40 MG EC tablet TAKE 1 TABLET BY MOUTH EVERY DAY 90 tablet 3    QUEtiapine (SEROquel) 100 MG tablet TAKE 1 TABLET BY MOUTH EVERY DAY AT NIGHT 90 tablet 3    venlafaxine XR (Effexor XR) 37.5 MG 24 hr capsule Take 1 capsule by mouth Daily. 90 capsule 1    warfarin (COUMADIN) 5 MG tablet Take 1 tablet by mouth Daily.      warfarin (Jantoven) 3 MG tablet TAKE 1 TABLET BY MOUTH EVERY DAY or as directed by MD depending on INR. 90 tablet 2     No current facility-administered medications for this visit.       Godfrey Shah had no medications administered during this visit.    No follow-ups on file.    There are no Patient Instructions on file for this visit.

## 2025-03-26 ENCOUNTER — TELEPHONE (OUTPATIENT)
Dept: FAMILY MEDICINE CLINIC | Facility: CLINIC | Age: 80
End: 2025-03-26

## 2025-03-26 DIAGNOSIS — R23.2 HOT FLASHES: Primary | ICD-10-CM

## 2025-03-26 LAB
ALBUMIN SERPL-MCNC: 4.4 G/DL (ref 3.8–4.8)
ALBUMIN/CREAT UR: <9 MG/G CREAT (ref 0–29)
ALP SERPL-CCNC: 129 IU/L (ref 44–121)
ALT SERPL-CCNC: 22 IU/L (ref 0–44)
AST SERPL-CCNC: 26 IU/L (ref 0–40)
BILIRUB SERPL-MCNC: 1 MG/DL (ref 0–1.2)
BUN SERPL-MCNC: 24 MG/DL (ref 8–27)
BUN/CREAT SERPL: 18 (ref 10–24)
CALCIUM SERPL-MCNC: 9.5 MG/DL (ref 8.6–10.2)
CHLORIDE SERPL-SCNC: 96 MMOL/L (ref 96–106)
CHOLEST SERPL-MCNC: 165 MG/DL (ref 100–199)
CHOLEST/HDLC SERPL: 4.6 RATIO (ref 0–5)
CO2 SERPL-SCNC: 26 MMOL/L (ref 20–29)
CREAT SERPL-MCNC: 1.3 MG/DL (ref 0.76–1.27)
CREAT UR-MCNC: 34 MG/DL
EGFRCR SERPLBLD CKD-EPI 2021: 56 ML/MIN/1.73
GLOBULIN SER CALC-MCNC: 3 G/DL (ref 1.5–4.5)
GLUCOSE SERPL-MCNC: 110 MG/DL (ref 70–99)
HBA1C MFR BLD: 6.5 % (ref 4.8–5.6)
HDLC SERPL-MCNC: 36 MG/DL
LDLC SERPL CALC-MCNC: 94 MG/DL (ref 0–99)
MICROALBUMIN UR-MCNC: <3 UG/ML
POTASSIUM SERPL-SCNC: 3.9 MMOL/L (ref 3.5–5.2)
PROT SERPL-MCNC: 7.4 G/DL (ref 6–8.5)
SODIUM SERPL-SCNC: 140 MMOL/L (ref 134–144)
TRIGL SERPL-MCNC: 205 MG/DL (ref 0–149)
VLDLC SERPL CALC-MCNC: 35 MG/DL (ref 5–40)

## 2025-03-26 RX ORDER — VENLAFAXINE HYDROCHLORIDE 75 MG/1
75 CAPSULE, EXTENDED RELEASE ORAL DAILY
Qty: 90 CAPSULE | Refills: 2 | Status: SHIPPED | OUTPATIENT
Start: 2025-03-26

## 2025-03-26 NOTE — TELEPHONE ENCOUNTER
Caller: Godfrey Shah    Relationship: Self    Best call back number: [phone]      What was the call regarding: PATIENT STATES PHARMACY NEVER GOT PRESCRIPTION FOR THE MEDICATION      venlafaxine XR (Effexor XR) 37.5 MG 24 hr capsule     STATES IT WAS SUPPOSE TO UP DOSE  75MG     PLEASE GIVE CALLBACK       [id number] - [facility] [address] [phone] [facility] [phone] FX

## 2025-04-21 ENCOUNTER — HOSPITAL ENCOUNTER (EMERGENCY)
Facility: HOSPITAL | Age: 80
Discharge: HOME | End: 2025-04-21
Attending: EMERGENCY MEDICINE | Admitting: EMERGENCY MEDICINE
Payer: MEDICARE

## 2025-04-21 ENCOUNTER — TELEPHONE (OUTPATIENT)
Dept: FAMILY MEDICINE CLINIC | Facility: CLINIC | Age: 80
End: 2025-04-21

## 2025-04-21 ENCOUNTER — APPOINTMENT (OUTPATIENT)
Dept: GENERAL RADIOLOGY | Facility: HOSPITAL | Age: 80
End: 2025-04-21
Payer: MEDICARE

## 2025-04-21 VITALS
TEMPERATURE: 96.9 F | SYSTOLIC BLOOD PRESSURE: 120 MMHG | DIASTOLIC BLOOD PRESSURE: 79 MMHG | OXYGEN SATURATION: 90 % | HEART RATE: 99 BPM | RESPIRATION RATE: 18 BRPM

## 2025-04-21 DIAGNOSIS — S61.452A DOG BITE OF LEFT HAND, INITIAL ENCOUNTER: Primary | ICD-10-CM
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
## 2025-04-21 PROCEDURE — 25010000002 LIDOCAINE 1 % SOLUTION: Performed by: PHYSICIAN ASSISTANT

## 2025-04-21 PROCEDURE — 90715 TDAP VACCINE 7 YRS/> IM: CPT | Performed by: PHYSICIAN ASSISTANT

## 2025-04-21 PROCEDURE — 25010000002 TETANUS-DIPHTH-ACELL PERTUSSIS 5-2.5-18.5 LF-MCG/0.5 SUSPENSION PREFILLED SYRINGE: Performed by: PHYSICIAN ASSISTANT

## 2025-04-21 PROCEDURE — 73140 X-RAY EXAM OF FINGER(S): CPT

## 2025-04-21 PROCEDURE — 90471 IMMUNIZATION ADMIN: CPT | Performed by: PHYSICIAN ASSISTANT

## 2025-04-21 PROCEDURE — 99283 EMERGENCY DEPT VISIT LOW MDM: CPT

## 2025-04-21 RX ORDER — LIDOCAINE HYDROCHLORIDE 10 MG/ML
10 INJECTION, SOLUTION INFILTRATION; PERINEURAL ONCE
Status: COMPLETED | OUTPATIENT
Start: 2025-04-21 | End: 2025-04-21

## 2025-04-21 RX ADMIN — AMOXICILLIN AND CLAVULANATE POTASSIUM 1 TABLET: 875; 125 TABLET, COATED ORAL at 18:23

## 2025-04-21 RX ADMIN — LIDOCAINE HYDROCHLORIDE 10 ML: 10 INJECTION, SOLUTION INFILTRATION; PERINEURAL at 18:23

## 2025-04-21 RX ADMIN — TETANUS TOXOID, REDUCED DIPHTHERIA TOXOID AND ACELLULAR PERTUSSIS VACCINE, ADSORBED 0.5 ML: 5; 2.5; 8; 8; 2.5 SUSPENSION INTRAMUSCULAR at 18:24

## 2025-04-21 NOTE — ED PROVIDER NOTES
EMERGENCY DEPARTMENT ENCOUNTER    Room Number:  43/43  Date of encounter:  4/21/2025  PCP: Godfrey Jackson MD  Historian: Patient, wife  Chronic or social conditions impacting care (social determinants of health): None    HPI:  Chief Complaint: Dog bite  A complete HPI/ROS/PMH/PSH/SH/FH are unobtainable due to: Nothing    Context: Godfrey Shah is a 79 y.o. male with a history of hypercholesterolemia, COPD, A-fib, who presents to the ED c/o acute dog bite to the left ring finger.  Patient reports that he was out cutting his yard when a stray dog bit him on the left ring finger.  He denies any other injuries.  Denies any numbness or tingling distal to the bite.  Unknown last tetanus shot.  Animal control was called and may have captured the dog.    Review of prior external notes (non-ED):   I reviewed primary care office visit dated 3/25/2025.  Patient being followed for CHF, CAD, diabetes, hypertension.    Review of prior external test results outside of this encounter:  I reviewed a CMP dated 3/25/2025.  Creatinine 1.3, potassium 3.9    PAST MEDICAL HISTORY  Active Ambulatory Problems     Diagnosis Date Noted    Bradycardia, sinus 04/15/2019    Sleep-disordered breathing 04/15/2019    Symptomatic bradycardia 04/16/2019    AV block 04/15/2019    Prostate cancer 05/01/2017    Pacemaker     Insomnia     RYAN (obstructive sleep apnea)     Nephrolithiasis     Hyperglycemia     GERD (gastroesophageal reflux disease)     Osteoarthritis     COPD (chronic obstructive pulmonary disease)     Inability to attain erection     Coronary artery disease     Visit for suture removal 10/24/2019    Medicare annual wellness visit, subsequent 12/10/2019    Mixed hyperlipidemia 12/10/2019    COPD with acute exacerbation 01/28/2020    Essential hypertension 01/28/2020    Paroxysmal atrial fibrillation 06/16/2020    Other age-related cataract 05/19/2021    High risk medication use 11/11/2021    Asymptomatic microscopic hematuria  05/11/2022    Type 2 diabetes mellitus with circulatory disorder, without long-term current use of insulin 11/16/2022    Other hemorrhoids 11/16/2022    Benign paroxysmal positional vertigo 01/28/2021    First degree heart block 01/28/2021    Anal fissure 07/26/2023    Rectal pain 07/26/2023    Personal history of colonic polyps 07/26/2023    Right rotator cuff tendonitis 11/20/2023    Presence of cardiac defibrillator 11/27/2024    Hot flashes 11/27/2024    Chronic systolic congestive heart failure 11/27/2024     Resolved Ambulatory Problems     Diagnosis Date Noted    No Resolved Ambulatory Problems     Past Medical History:   Diagnosis Date    Abnormal CXR 09/2016    Alcohol use 08/18/2016    Allergic rhinitis     Anticoagulant long-term use     Anxiety     Arm mass, right     ASCVD (arteriosclerotic cardiovascular disease)     Asthma     AV block, complete     BCC (basal cell carcinoma) 10/2020    Biceps muscle tear 03/01/2017    BPH (benign prostatic hyperplasia)     Bradycardia 01/2022    CAD (coronary artery disease)     Cardiac pacemaker in situ     Cataract     Chronic constipation     Colon polyps     Cyst of kidney, acquired     Dacryoadenitis of both lacrimal glands     Depression     Diplopia     Eczema     ED (erectile dysfunction)     Gastroduodenitis 11/2017    Glaucoma     Hematuria     Hemorrhoids     Hyperlipidemia     Hypertension     Insect bite of right upper arm 09/07/2017    Kidney stones     Laceration of foot 10/16/2019    Left kidney mass     Long term (current) use of anticoagulants     Mediastinal adenopathy 08/2020    Other fatigue     PAF (paroxysmal atrial fibrillation)     Peripheral vertigo 07/21/2019    Plantar fascial fibromatosis 07/2016    Pleural effusion 06/2019    Pneumothorax 04/2019    Pulmonary infiltrate 06/2018    Rectal bleeding 12/2022    Regular astigmatism of left eye     Rotator cuff tendonitis, right     Seborrheic keratosis     Snoring     SSS (sick sinus  syndrome)     Syncope and collapse 09/2020    Tachycardia 07/2022    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    Urinary incontinence 01/2022    Vertigo 05/02/2014         PAST SURGICAL HISTORY  Past Surgical History:   Procedure Laterality Date    CARDIAC CATHETERIZATION N/A 04/17/2019    Procedure: LEFT HEART CATH;  Surgeon: Lalo Naranjo MD;  Location:  [facility];  Service: Cardiovascular    CARDIAC CATHETERIZATION N/A 04/17/2019    Procedure: CORONARY ANGIOGRAPHY;  Surgeon: Lalo Naranjo MD;  Location:  [facility];  Service: Cardiovascular    CARDIAC CATHETERIZATION N/A 04/17/2019    Procedure: Left ventriculography;  Surgeon: Lalo Naranjo MD;  Location:  [facility];  Service: Cardiovascular    CARDIAC ELECTROPHYSIOLOGY PROCEDURE Left 04/18/2019    Procedure: Pacemaker DC new;  Surgeon: Curtis Moore DO;  Location:  [facility];  Service: Cardiovascular    COLONOSCOPY N/A 04/2017    COLONOSCOPY N/A 2014    WNL    COLONOSCOPY N/A 09/21/2023    DIVERTICULOSIS IN SIGMOID, MODERATE HEMORRHOIDS, ANAL FISSURE- INJECTED WITH BOTOX, RESCOPE IN 5 YRS, DR. HARDEEP POOL AT [facility]    COLONOSCOPY W/ POLYPECTOMY N/A 03/2011    POLYPS, RESCOPE IN 5 YRS    CORONARY ANGIOPLASTY WITH STENT PLACEMENT      CYST REMOVAL      CYSTOSCOPY RETROGRADE PYELOGRAM N/A 05/28/2014    DR. YOHANA REIS AT [place]    ENDOSCOPY N/A 12/04/2003    DUODENAL POLYPS X3, PATH: MILD CHRONIC DUODENITIS, DR. KRISHNA CHÁVEZ AT [facility]    LACERATION REPAIR Left 10/16/2019    LEFT FOOT, DONE AT [facility] ER    PENILE PROSTHESIS IMPLANT N/A     PROSTATE BIOPSY Bilateral 01/2015    NEEDLE BIOPSY, WNL    RECTAL BOTOX INJECTION N/A 09/21/2023    ANAL FISSURE INJECTED WITH 70 UNITS OF BOTOX, DR. HARDEEP POOL AT [facility]    VASECTOMY N/A     REVERSED- 1977?         FAMILY HISTORY  Family History   Problem Relation Age of Onset    No Known Problems Mother     Heart disease Father     Coronary artery  disease Father     Heart disease Sister     Depression Sister     Depression Brother     Heart disease Brother     Malonofre Hyperthermia Neg Hx          SOCIAL HISTORY  Social History     Socioeconomic History    Marital status:    Tobacco Use    Smoking status: Former     Current packs/day: 0.00     Average packs/day: 1 pack/day for 47.0 years (47.0 ttl pk-yrs)     Types: Cigarettes     Start date: 10/1952     Quit date: 10/1999     Years since quittin.5     Passive exposure: Past    Smokeless tobacco: Never    Tobacco comments:     Quit    Vaping Use    Vaping status: Never Used   Substance and Sexual Activity    Alcohol use: No    Drug use: Never    Sexual activity: Defer         ALLERGIES  Patient has no known allergies.        REVIEW OF SYSTEMS  All systems reviewed and negative except for those discussed in HPI.       PHYSICAL EXAM    I have reviewed the triage vital signs and nursing notes.    ED Triage Vitals   Temp Heart Rate Resp BP SpO2   25 1503 25 1503 25 1503 25 1508 25 1503   96.9 °F (36.1 °C) 99 18 120/79 90 %      Temp src Heart Rate Source Patient Position BP Location FiO2 (%)   25 1503 -- -- -- --   Tympanic           Physical Exam  GENERAL:, Alert, oriented, not distressed  HENT: head atraumatic, no nuchal rigidity  EYES: no scleral icterus, EOMI  CV: regular rhythm, regular rate, no murmur  RESPIRATORY: normal effort, CTA  ABDOMEN: soft, nontender  MUSCULOSKELETAL: Multiple superficial lacerations to the left ring finger over the dorsal surface.  Larger laceration over the palmar surface.  Neurovascularly intact distally.  Preserved flexion and extension.  No significant bony tenderness.  NEURO: alert, moves all extremities, follows commands  SKIN: warm, dry      RADIOLOGY  XR Finger 2+ View Left  Result Date: 2025  XR FINGER 2+ VW LEFT-2025  HISTORY: Dog bite.  There is soft tissue deformity of the fourth finger from the dog bite. There  is a crush injury with fracture of the proximal aspect of the middle phalanx along the dorsal aspect.  No other fractures or dislocations are seen.      1. Soft tissue deformity of the left fourth finger with crush type fracture of the middle phalanx.         I ordered the above noted radiological studies. Reviewed by me and discussed with radiologist.  See dictation for official radiology interpretation.      MEDICATIONS GIVEN IN ER    Medications   lidocaine (XYLOCAINE) 1 % injection 10 mL (10 mL Injection Given 4/21/25 1823)   Tetanus-Diphth-Acell Pertussis (BOOSTRIX) injection 0.5 mL (0.5 mL Intramuscular Given 4/21/25 1824)   amoxicillin-clavulanate (AUGMENTIN) 875-125 MG per tablet 1 tablet (1 tablet Oral Given 4/21/25 1823)     Laceration Repair    Date/Time: 4/21/2025 8:02 PM    Performed by: Lalit Lunsford PA  Authorized by: Yaron Arita MD    Consent:     Consent obtained:  Verbal    Consent given by:  Patient  Universal protocol:     Patient identity confirmed:  Verbally with patient  Anesthesia:     Anesthesia method:  Local infiltration    Local anesthetic:  Lidocaine 1% w/o epi  Laceration details:     Location:  Finger    Finger location:  L ring finger    Length (cm):  2.8  Pre-procedure details:     Preparation:  Patient was prepped and draped in usual sterile fashion and imaging obtained to evaluate for foreign bodies  Exploration:     Hemostasis achieved with:  Direct pressure    Imaging obtained: x-ray      Wound extent: no foreign bodies/material noted, no nerve damage noted and no tendon damage noted    Treatment:     Area cleansed with:  Chlorhexidine    Amount of cleaning:  Extensive    Irrigation solution:  Sterile saline    Debridement:  Minimal  Skin repair:     Repair method:  Sutures    Suture size:  5-0    Suture material:  Nylon    Suture technique:  Simple interrupted and horizontal mattress  Approximation:     Approximation:  Loose  Repair type:     Repair type:   Simple  Post-procedure details:     Dressing:  Antibiotic ointment and splint for protection    Procedure completion:  Tolerated well, no immediate complications        ADDITIONAL ORDERS CONSIDERED BUT NOT ORDERED:  Admission was considered but after careful review of the patient's presentation, physical examination, diagnostic results, and response to treatment the patient may be safely discharged with outpatient follow-up.       PROGRESS, DATA ANALYSIS, CONSULTS, AND MEDICAL DECISION MAKING    All labs have been independently interpreted by myself.  All radiology studies have been independently interpreted by myself and discussed with radiologist dictating the report.   EKGs independently interpreted by myself.  Discussion below represents my analysis of pertinent findings related to patient's condition, differential diagnosis, treatment plan and final disposition.    I have discussed case with Dr. Arita, emergency room physician.  He has performed his own bedside examination and agrees with treatment plan.    ED Course as of 04/21/25 2004 Mon Apr 21, 2025   1731 Patient presents after being bit by dog on the left ring finger.  Patient is neurovascularly intact distally.  Unknown tetanus shot.    The dog was an apparent stray.  Animal control was on the scene.  Family is inquiring to see if the dog was captured or not. [EE]   1749 XR Finger 2+ View Left  My independent interpretation of the imaging study is no dislocation [TR]   1830 Patient was contacted by animal service.  The dog is captured and in quarantine.  We will loosely approximate the laceration.  We will ensure that he has appropriate hand follow-up. [EE]   1936 Wound has been thoroughly cleaned, irrigated and loosely closed.  Patient really has no significant tenderness over the middle phalanx.  I do not believe the bone is actually fractured.  We will still have the patient follow-up with hand surgery.  They understand risk of infection and  return for any redness or fever.   [EE]      ED Course User Index  [EE] Lalit Lunsford PA  [TR] Yaron Arita MD       AS OF 20:04 EDT VITALS:    BP - 120/79  HR - 99  TEMP - 96.9 °F (36.1 °C) (Tympanic)  O2 SATS - 90%        DIAGNOSIS  Final diagnoses:   Dog bite of left hand, initial encounter         DISPOSITION  Discharged    Admission was considered but after careful review of the patient's presentation, physical examination, diagnostic results, and response to treatment the patient may be safely discharged with outpatient follow-up.         Dictated utilizing Dragon dictation     Lalit Lunsford PA  04/21/25 2004

## 2025-04-21 NOTE — ED PROVIDER NOTES
EMERGENCY DEPARTMENT MD ATTESTATION NOTE    Room Number:  43/43  PCP: Godfrey Jackson MD  Independent Historians: Patient and Family    HPI:  A complete HPI/ROS/PMH/PSH/SH/FH are unobtainable due to: None    Chronic or social conditions impacting patient care (Social Determinants of Health): None      Context: Godfrey Shah is a 79 y.o. male with a medical history of anticoagulation on Coumadin, sinus bradycardia, COPD, paroxysmal A-fib who presents to the ED c/o acute dog bite.  The patient reports that he was bitten in the left fourth digit by a dog today just prior to arrival.  They called the animal control services but they are uncertain whether the dog was caught or not.  He does not know when he last had a tetanus shot.  He denies any other injury.  He reports multiple wounds on the left fourth digit.        Review of prior external notes (non-ED) -and- Review of prior external test results outside of this encounter:  Laboratory evaluation 3/25/2025 shows a CMP with an elevated creatinine of 1.3.  He had a hemoglobin A1c of 6.5 on 3/25/2025    Prescription drug monitoring program review:           PHYSICAL EXAM    I have reviewed the triage vital signs and nursing notes.    ED Triage Vitals   Temp Heart Rate Resp BP SpO2   04/21/25 1503 04/21/25 1503 04/21/25 1503 04/21/25 1508 04/21/25 1503   96.9 °F (36.1 °C) 99 18 120/79 90 %      Temp src Heart Rate Source Patient Position BP Location FiO2 (%)   04/21/25 1503 -- -- -- --   Tympanic           Physical Exam  GENERAL: Awake, alert, no acute distress  SKIN: Warm, dry  HENT: Normocephalic, atraumatic  EYES: no scleral icterus  CV: regular rhythm, regular rate  RESPIRATORY: normal effort, lungs clear  ABDOMEN: soft, nontender, nondistended  MUSCULOSKELETAL: no deformity.  Left fourth finger with laceration on the volar surface.  Strength is intact on flexion and extension.  NEURO: alert, moves all extremities, follows commands            MEDICATIONS  GIVEN IN ER  Medications   lidocaine (XYLOCAINE) 1 % injection 10 mL (10 mL Injection Given 4/21/25 1823)   Tetanus-Diphth-Acell Pertussis (BOOSTRIX) injection 0.5 mL (0.5 mL Intramuscular Given 4/21/25 1824)   amoxicillin-clavulanate (AUGMENTIN) 875-125 MG per tablet 1 tablet (1 tablet Oral Given 4/21/25 1823)         ORDERS PLACED DURING THIS VISIT:  Orders Placed This Encounter   Procedures    Laceration Repair    XR Finger 2+ View Left         PROCEDURES  Procedures            PROGRESS, DATA ANALYSIS, CONSULTS, AND MEDICAL DECISION MAKING  All labs have been independently interpreted by me.  All radiology studies have been reviewed by me. All EKG's have been independently viewed and interpreted by me.  Discussion below represents my analysis of pertinent findings related to patient's condition, differential diagnosis, treatment plan and final disposition.    Differential diagnosis includes but is not limited to laceration, fracture, tendon laceration.    Clinical Scores:                                     ED Course as of 04/22/25 1626   Mon Apr 21, 2025   1731 Patient presents after being bit by dog on the left ring finger.  Patient is neurovascularly intact distally.  Unknown tetanus shot.    The dog was an apparent stray.  Animal control was on the scene.  Family is inquiring to see if the dog was captured or not. [EE]   1749 XR Finger 2+ View Left  My independent interpretation of the imaging study is no dislocation [TR]   1830 Patient was contacted by animal service.  The dog is captured and in quarantine.  We will loosely approximate the laceration.  We will ensure that he has appropriate hand follow-up. [EE]   1936 Wound has been thoroughly cleaned, irrigated and loosely closed.  Patient really has no significant tenderness over the middle phalanx.  I do not believe the bone is actually fractured.  We will still have the patient follow-up with hand surgery.  They understand risk of infection and return  for any redness or fever.   [EE]      ED Course User Index  [EE] Lalit Lunsford PA  [TR] Yaron Arita MD       MDM: The patient presents with a dog bite of his left fourth digit.  He is on blood thinners but is not having any significant bleeding.  He denies any other injury.  Plan to x-ray the left fourth digit to rule out fracture.  We will update his tetanus status.  We will cover him with antibiotics.  Given that he is on Coumadin and we are starting antibiotics he will need his INR checked more frequently as an outpatient to ensure that he does not get toxic on his warfarin.      COMPLEXITY OF CARE  Admission was considered but after careful review of the patient's presentation, physical examination, diagnostic results, and response to treatment the patient may be safely discharged with outpatient follow-up.    Please note that portions of this document were completed with a voice recognition program.    Note Disclaimer: At [facility], we believe that sharing information builds trust and better relationships. You are receiving this note because you recently visited [facility]. It is possible you will see health information before a provider has talked with you about it. This kind of information can be easy to misunderstand. To help you fully understand what it means for your health, we urge you to discuss this note with your provider.         Yaron Arita MD  04/21/25 1752       Yaron Arita MD  04/21/25 1819       Yaron Arita MD  04/21/25 1855       Yaron Arita MD  04/21/25 1855       Yaron Arita MD  04/22/25 1626

## 2025-04-21 NOTE — TELEPHONE ENCOUNTER
Caller: Silvia Sahh    Relationship to patient: Emergency Contact    Best call back number: [phone]     Chief complaint:   WHILE MOWING HIS LAWN, A STRAY DOG CAME UP TO HIM AND BIT HIM.  THE PATIENT IS BLEEDING AND IT IS NOT STOPPING DUE TO HIM BEING ON BLOOD THINNERS.  THEY WILL GO TO THE ER.  ALSO CONCERNED ABOUT RABIES    Patient directed to call 911 or go to their nearest emergency room.     Patient verbalized understanding: [x] Yes  [] No  If no, why?

## 2025-04-21 NOTE — DISCHARGE INSTRUCTIONS
Change dressing in 48 hours.  Change once daily until seen by hand surgery.    Return for any fever or redness.    The antibiotics will affect your INR.  Please have this rechecked in the next 3 to 4 days.

## 2025-04-22 ENCOUNTER — TELEPHONE (OUTPATIENT)
Dept: FAMILY MEDICINE CLINIC | Facility: CLINIC | Age: 80
End: 2025-04-22

## 2025-04-22 NOTE — TELEPHONE ENCOUNTER
Caller: Godfrey Shah    Relationship to patient: Self    Best call back number:     Patient is needing: PATIENT STATES THAT YESTERDAY HE GOT BIT BY A DOG WITH NO TAGS.   HE STATES HE WENT TO THE EMERGENCY ROOM TO HAVE HIS HAND SUTURED.  PATIENT IS STILL WAITING FOR THE DOG POUND TO GET BACK WITH HIM TO SHOW HIM PICTURES OF THE DOG THAT POSSIBLY BIT HIM, BUT THEY DID NOT CALL HIM BACK TODAY.    HE IS WORRIED ABOUT RABIES, AND WANTS TO KNOW FROM DR. MURGUIA WHAT THE BEST STEPS ARE FOR HIM TO TAKE.   HE STATES THAT HE WANTS A CALLBACK TO ADVISE IF HE SHOULD WAIT 10 DAYS OR GET A RABIES VACCINATION NOW.

## 2025-04-24 ENCOUNTER — PATIENT OUTREACH (OUTPATIENT)
Dept: CASE MANAGEMENT | Facility: OTHER | Age: 80
End: 2025-04-24
Payer: MEDICARE

## 2025-04-24 ENCOUNTER — TELEPHONE (OUTPATIENT)
Dept: CASE MANAGEMENT | Facility: OTHER | Age: 80
End: 2025-04-24
Payer: MEDICARE

## 2025-04-24 NOTE — OUTREACH NOTE
AMBULATORY CASE MANAGEMENT NOTE    Names and Relationships of Patient/Support Persons: Contact: Godfrey Shah; Relationship: Self -     CCM Interim Update    Received a call back from patient and wife. Introduced self, role and reason for the call. Patient visit ER for recent dog bite. Patient to see Hand surgeon tomorrow as he sustain fracture. Patient also did go to [facility] ER for rabies treatment but they were told that they do not do that. They were redirected to [place] for this treatment. He did receive preventative shots and to have upcoming consecutive shots scheduled. They state the dog is still not captured. Patient and wife verbalizes undertstanding of what to do and they deny further assistance needed at this time. A1C controlled, HTN controlled. Denies further assistance with Lipid control - on meds and following low fat diet/choosing healthier oils.     Education Documentation  No documentation found.        Jessica VICKERS  Ambulatory Case Management    4/24/2025, 14:42 EDT

## 2025-05-12 DIAGNOSIS — I50.22 CHRONIC SYSTOLIC CONGESTIVE HEART FAILURE: ICD-10-CM

## 2025-05-12 RX ORDER — FUROSEMIDE 20 MG/1
TABLET ORAL
Qty: 30 TABLET | Refills: 4 | Status: SHIPPED | OUTPATIENT
Start: 2025-05-12

## 2025-05-22 RX ORDER — ALBUTEROL SULFATE 90 UG/1
2 INHALANT RESPIRATORY (INHALATION) EVERY 4 HOURS PRN
Qty: 18 G | Refills: 3 | Status: SHIPPED | OUTPATIENT
Start: 2025-05-22

## 2025-05-22 NOTE — TELEPHONE ENCOUNTER
Caller: Godfrey Shah    Relationship: Self    Best call back number: [phone]     Requested Prescriptions:   Requested Prescriptions     Pending Prescriptions Disp Refills    albuterol sulfate HFA (Ventolin HFA) 108 (90 Base) MCG/ACT inhaler 18 g 3     Sig: Inhale 2 puffs Every 4 (Four) Hours As Needed for Wheezing.        Pharmacy where request should be sent: [id number] - [place], [place] - [address] [phone] [facility] [phone]      Last office visit with prescribing clinician: 3/25/2025   Last telemedicine visit with prescribing clinician: Visit date not found   Next office visit with prescribing clinician: 7/21/2025     Additional details provided by patient: PATIENT IS OUT OF REFILLS ON THIS    Does the patient have less than a 3 day supply:  [x] Yes  [] No    Chela Dumont Rep   05/22/25 15:36 EDT

## 2025-07-21 ENCOUNTER — OFFICE VISIT (OUTPATIENT)
Dept: FAMILY MEDICINE CLINIC | Facility: CLINIC | Age: 80
End: 2025-07-21
Payer: MEDICARE

## 2025-07-21 VITALS
TEMPERATURE: 97.1 F | WEIGHT: 205.5 LBS | DIASTOLIC BLOOD PRESSURE: 70 MMHG | OXYGEN SATURATION: 92 % | HEART RATE: 62 BPM | SYSTOLIC BLOOD PRESSURE: 122 MMHG | BODY MASS INDEX: 30.33 KG/M2

## 2025-07-21 DIAGNOSIS — I10 ESSENTIAL HYPERTENSION: ICD-10-CM

## 2025-07-21 DIAGNOSIS — E11.59 TYPE 2 DIABETES MELLITUS WITH OTHER CIRCULATORY COMPLICATION, WITHOUT LONG-TERM CURRENT USE OF INSULIN: Primary | ICD-10-CM

## 2025-07-21 DIAGNOSIS — E78.2 MIXED HYPERLIPIDEMIA: ICD-10-CM

## 2025-07-21 PROCEDURE — 99214 OFFICE O/P EST MOD 30 MIN: CPT | Performed by: FAMILY MEDICINE

## 2025-07-21 PROCEDURE — 1126F AMNT PAIN NOTED NONE PRSNT: CPT | Performed by: FAMILY MEDICINE

## 2025-07-21 PROCEDURE — G2211 COMPLEX E/M VISIT ADD ON: HCPCS | Performed by: FAMILY MEDICINE

## 2025-07-21 PROCEDURE — 3074F SYST BP LT 130 MM HG: CPT | Performed by: FAMILY MEDICINE

## 2025-07-21 PROCEDURE — 3078F DIAST BP <80 MM HG: CPT | Performed by: FAMILY MEDICINE

## 2025-07-21 RX ORDER — LOSARTAN POTASSIUM AND HYDROCHLOROTHIAZIDE 12.5; 5 MG/1; MG/1
1 TABLET ORAL DAILY
Qty: 90 TABLET | Refills: 3 | Status: SHIPPED | OUTPATIENT
Start: 2025-07-21

## 2025-07-21 NOTE — PROGRESS NOTES
Chief Complaint   Patient presents with    Hypertension       Subjective   Godfrey Shah is a 80 y.o. male.     Hypertension  Associated symptoms: no blurred vision, no chest pain and no shortness of breath       FU HTN . Gets dizzy with standing on occasion.    F/U DM2.  Doing well with meds.  No Se.      The following portions of the patient's history were reviewed and updated as appropriate: allergies, current medications, past family history, past medical history, past social history, past surgical history and problem list.    Review of Systems   Constitutional:  Negative for appetite change and fatigue.   HENT:  Negative for nosebleeds and sore throat.    Eyes:  Negative for blurred vision and visual disturbance.   Respiratory:  Negative for shortness of breath and wheezing.    Cardiovascular:  Negative for chest pain and leg swelling.   Gastrointestinal:  Negative for abdominal distention and abdominal pain.   Endocrine: Negative for cold intolerance and polyuria.   Genitourinary:  Negative for dysuria and hematuria.   Musculoskeletal:  Negative for arthralgias and myalgias.   Skin:  Negative for color change and rash.   Neurological:  Negative for weakness and confusion.   Psychiatric/Behavioral:  Negative for agitation and depressed mood.        Patient Active Problem List   Diagnosis    Bradycardia, sinus    Sleep-disordered breathing    Symptomatic bradycardia    AV block    Prostate cancer    Pacemaker    Insomnia    RYAN (obstructive sleep apnea)    Nephrolithiasis    GERD (gastroesophageal reflux disease)    Osteoarthritis    COPD (chronic obstructive pulmonary disease)    Inability to attain erection    Coronary artery disease    Visit for suture removal    Medicare annual wellness visit, subsequent    Mixed hyperlipidemia    COPD with acute exacerbation    Essential hypertension    Paroxysmal atrial fibrillation    Other age-related cataract    High risk medication use    Asymptomatic microscopic  hematuria    Type 2 diabetes mellitus with circulatory disorder, without long-term current use of insulin    Other hemorrhoids    Benign paroxysmal positional vertigo    First degree heart block    Anal fissure    Rectal pain    Personal history of colonic polyps    Right rotator cuff tendonitis    Presence of cardiac defibrillator    Hot flashes    Chronic systolic congestive heart failure       No Known Allergies      Current Outpatient Medications:     albuterol sulfate HFA (Ventolin HFA) 108 (90 Base) MCG/ACT inhaler, Inhale 2 puffs Every 4 (Four) Hours As Needed for Wheezing., Disp: 18 g, Rfl: 3    aspirin (aspirin) 81 MG EC tablet, Take 1 tablet by mouth Daily., Disp: , Rfl:     atorvastatin (LIPITOR) 40 MG tablet, Take 1 tablet by mouth Every Night., Disp: 90 tablet, Rfl: 3    cetirizine (ZyrTEC) 10 MG tablet, Take 1 tablet by mouth Daily., Disp: , Rfl:     furosemide (LASIX) 20 MG tablet, TAKE 1 TABLET BY MOUTH EVERY DAY AS NEEDED FOR chest congestion or swelling in legs., Disp: 30 tablet, Rfl: 4    latanoprost (XALATAN) 0.005 % ophthalmic solution, instill 1 drop by ophthalmic route  every day into both eye(s) every day the evening, Disp: , Rfl:     Leuprolide Mesylate, 6 Month, 42 MG prefilled syringe, Inject  under the skin into the appropriate area as directed., Disp: , Rfl:     metoprolol succinate XL (TOPROL-XL) 25 MG 24 hr tablet, Take 1 tablet by mouth Daily., Disp: 90 tablet, Rfl: 3    nitroglycerin (NITROSTAT) 0.4 MG SL tablet, Place 1 tablet under the tongue Every 5 (Five) Minutes As Needed for Chest Pain (Chest Pain With Systolic Blood Pressure Greater Than 100)., Disp: 30 tablet, Rfl: 12    pantoprazole (PROTONIX) 40 MG EC tablet, TAKE 1 TABLET BY MOUTH EVERY DAY, Disp: 90 tablet, Rfl: 3    QUEtiapine (SEROquel) 100 MG tablet, Take 1 tablet by mouth every night at bedtime., Disp: 90 tablet, Rfl: 3    venlafaxine XR (Effexor XR) 75 MG 24 hr capsule, Take 1 capsule by mouth Daily., Disp: 90  capsule, Rfl: 2    warfarin (COUMADIN) 5 MG tablet, Take 1 tablet by mouth Daily., Disp: , Rfl:     warfarin (Jantoven) 3 MG tablet, TAKE 1 TABLET BY MOUTH EVERY DAY or as directed by MD depending on INR., Disp: 90 tablet, Rfl: 2    losartan-hydrochlorothiazide (Hyzaar) 50-12.5 MG per tablet, Take 1 tablet by mouth Daily., Disp: 90 tablet, Rfl: 3    Past Medical History:   Diagnosis Date    Abnormal CXR 09/2016    Alcohol use 08/18/2016    SEEN AT [facility] ER    Allergic rhinitis     Anal fissure 07/26/2023    Anticoagulant long-term use     Anxiety     Arm mass, right     ASCVD (arteriosclerotic cardiovascular disease)     Asthma     MILD, INTERMITTENT    AV block, complete     BCC (basal cell carcinoma) 10/2020    OF TRUNK, FOLLOWED BY DR. GUZMAN RIVERS    Biceps muscle tear 03/01/2017    BPH (benign prostatic hyperplasia)     FOLLOWED BY DR. BENNY OVALLE    Bradycardia 01/2022    CAD (coronary artery disease)     Cardiac pacemaker in situ     Cataract     BILATERAL    Chronic constipation     Colon polyps     FOLLOWED BY DR. ELLIE KUMAR    COPD (chronic obstructive pulmonary disease)     FOLLOWED BY DR. ALAN COLE    Cyst of kidney, acquired     Dacryoadenitis of both lacrimal glands     FOLLOWED BY DR. JADA NEW    Depression     Diplopia     Eczema     ED (erectile dysfunction)     Gastroduodenitis 11/2017    GERD (gastroesophageal reflux disease)     Glaucoma     BILATERAL    Hematuria     Hemorrhoids     Hyperglycemia     Hyperlipidemia     MIXED HLD    Hypertension     Inability to attain erection     Insect bite of right upper arm 09/07/2017    Insomnia     Kidney stones     Laceration of foot 10/16/2019    LEFT FOOT, SEEN AT [facility] ER    Left kidney mass     BEING FOLLOWED BY DR. BENNY OVALLE    Long term (current) use of anticoagulants     Mediastinal adenopathy 08/2020    RYAN (obstructive sleep apnea)     PT STATES I DONT HAVE THAT ANYMORE    Osteoarthritis     Other fatigue     PAF (paroxysmal atrial  fibrillation)     Peripheral vertigo 07/21/2019    SEEN AT 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    Urinary incontinence 01/2022    Vertigo 05/02/2014    SEEN AT [place] ER       Past Surgical History:   Procedure Laterality Date    CARDIAC CATHETERIZATION N/A 04/17/2019    Procedure: LEFT HEART CATH;  Surgeon: Lalo Naranjo MD;  Location:  [facility];  Service: Cardiovascular    CARDIAC CATHETERIZATION N/A 04/17/2019    Procedure: CORONARY ANGIOGRAPHY;  Surgeon: Lalo Naranjo MD;  Location:  [facility];  Service: Cardiovascular    CARDIAC CATHETERIZATION N/A 04/17/2019    Procedure: Left ventriculography;  Surgeon: Lalo Naranjo MD;  Location:  [facility];  Service: Cardiovascular    CARDIAC ELECTROPHYSIOLOGY PROCEDURE Left 04/18/2019    Procedure: Pacemaker DC new;  Surgeon: Curtis Moore DO;  Location:  [facility];  Service: Cardiovascular    COLONOSCOPY N/A 04/2017    COLONOSCOPY N/A 2014    WNL    COLONOSCOPY N/A 09/21/2023    DIVERTICULOSIS IN SIGMOID, MODERATE HEMORRHOIDS, ANAL FISSURE- INJECTED WITH BOTOX, RESCOPE IN 5 YRS, DR. HARDEEP POOL AT [facility]    COLONOSCOPY W/ POLYPECTOMY N/A 03/2011    POLYPS, RESCOPE IN 5 YRS    CORONARY ANGIOPLASTY WITH STENT PLACEMENT      CYST REMOVAL      CYSTOSCOPY RETROGRADE PYELOGRAM N/A 05/28/2014    DR. YOHANA REIS AT [place]    ENDOSCOPY N/A 12/04/2003    DUODENAL POLYPS X3, PATH: MILD CHRONIC DUODENITIS, DR. KRISHNA CHÁVEZ AT [facility]    LACERATION REPAIR Left 10/16/2019    LEFT FOOT, DONE AT [facility] ER     PENILE PROSTHESIS IMPLANT N/A     PROSTATE BIOPSY Bilateral 2015    NEEDLE BIOPSY, WNL    RECTAL BOTOX INJECTION N/A 2023    ANAL FISSURE INJECTED WITH 70 UNITS OF BOTOX, DR. HARDEEP POOL AT [facility]    VASECTOMY N/A     - ?       Family History   Problem Relation Age of Onset    No Known Problems Mother     Heart disease Father     Coronary artery disease Father     Heart disease Sister     Depression Sister     Depression Brother     Heart disease Brother     Malig Hyperthermia Neg Hx        Social History     Tobacco Use    Smoking status: Former     Current packs/day: 0.00     Average packs/day: 1 pack/day for 47.0 years (47.0 ttl pk-yrs)     Types: Cigarettes     Start date: 10/1952     Quit date: 10/1999     Years since quittin.8     Passive exposure: Past    Smokeless tobacco: Never    Tobacco comments:     Quit    Substance Use Topics    Alcohol use: No            Objective     Vitals:    25 1441   BP: 122/70   Pulse: 62   Temp: 97.1 °F (36.2 °C)   SpO2: 92%     Body mass index is 30.33 kg/m².    Physical Exam  Vitals reviewed.   Constitutional:       Appearance: He is well-developed. He is not diaphoretic.   HENT:      Head: Normocephalic and atraumatic.   Eyes:      General: No scleral icterus.     Pupils: Pupils are equal, round, and reactive to light.   Neck:      Thyroid: No thyromegaly.   Cardiovascular:      Rate and Rhythm: Normal rate and regular rhythm.      Heart sounds: No murmur heard.     No friction rub. No gallop.   Pulmonary:      Effort: Pulmonary effort is normal. No respiratory distress.      Breath sounds: No wheezing or rales.   Chest:      Chest wall: No tenderness.   Abdominal:      General: Bowel sounds are normal. There is no distension.      Palpations: Abdomen is soft.      Tenderness: There is no abdominal tenderness.   Musculoskeletal:         General: No deformity. Normal range of motion.   Lymphadenopathy:      Cervical: No cervical adenopathy.    Skin:     General: Skin is warm and dry.      Findings: No rash.   Neurological:      Cranial Nerves: No cranial nerve deficit.      Motor: No abnormal muscle tone.         Lab Results   Component Value Date    GLUCOSE 110 (H) 03/25/2025    BUN 24 03/25/2025    CREATININE 1.30 (H) 03/25/2025    EGFRIFNONA 61 11/11/2021    EGFRIFAFRI 71 11/11/2021    BCR 18 03/25/2025    K 3.9 03/25/2025    CO2 26 03/25/2025    CALCIUM 9.5 03/25/2025    ALBUMIN 4.4 03/25/2025    AST 26 03/25/2025    ALT 22 03/25/2025       WBC   Date Value Ref Range Status   08/30/2024 6.43 3.40 - 10.80 10*3/mm3 Final   11/16/2022 5.18 3.40 - 10.80 10*3/mm3 Final     RBC   Date Value Ref Range Status   08/30/2024 4.68 4.14 - 5.80 10*6/mm3 Final   11/16/2022 4.76 4.14 - 5.80 10*6/mm3 Final     Hemoglobin   Date Value Ref Range Status   08/30/2024 15.6 13.0 - 17.7 g/dL Final     Hematocrit   Date Value Ref Range Status   08/30/2024 45.4 37.5 - 51.0 % Final     MCV   Date Value Ref Range Status   08/30/2024 97.0 79.0 - 97.0 fL Final     MCH   Date Value Ref Range Status   08/30/2024 33.3 (H) 26.6 - 33.0 pg Final     MCHC   Date Value Ref Range Status   08/30/2024 34.4 31.5 - 35.7 g/dL Final     RDW   Date Value Ref Range Status   08/30/2024 13.4 12.3 - 15.4 % Final     RDW-SD   Date Value Ref Range Status   08/30/2024 48.1 37.0 - 54.0 fl Final     MPV   Date Value Ref Range Status   08/30/2024 9.4 6.0 - 12.0 fL Final     Platelets   Date Value Ref Range Status   08/30/2024 122 (L) 140 - 450 10*3/mm3 Final     Neutrophil %   Date Value Ref Range Status   08/30/2024 54.0 42.7 - 76.0 % Final     Lymphocyte %   Date Value Ref Range Status   08/30/2024 34.4 19.6 - 45.3 % Final     Monocyte %   Date Value Ref Range Status   08/30/2024 9.0 5.0 - 12.0 % Final     Eosinophil %   Date Value Ref Range Status   08/30/2024 1.9 0.3 - 6.2 % Final     Basophil %   Date Value Ref Range Status   08/30/2024 0.5 0.0 - 1.5 % Final     Immature Grans %   Date Value Ref  Range Status   08/30/2024 0.2 0.0 - 0.5 % Final     Neutrophils, Absolute   Date Value Ref Range Status   08/30/2024 3.48 1.70 - 7.00 10*3/mm3 Final     Lymphocytes, Absolute   Date Value Ref Range Status   08/30/2024 2.21 0.70 - 3.10 10*3/mm3 Final     Monocytes, Absolute   Date Value Ref Range Status   08/30/2024 0.58 0.10 - 0.90 10*3/mm3 Final     Eosinophils, Absolute   Date Value Ref Range Status   08/30/2024 0.12 0.00 - 0.40 10*3/mm3 Final     Basophils, Absolute   Date Value Ref Range Status   08/30/2024 0.03 0.00 - 0.20 10*3/mm3 Final     Immature Grans, Absolute   Date Value Ref Range Status   08/30/2024 0.01 0.00 - 0.05 10*3/mm3 Final     nRBC   Date Value Ref Range Status   08/30/2024 0.0 0.0 - 0.2 /100 WBC Final       Lab Results   Component Value Date    HGBA1C 6.5 (H) 03/25/2025       Lab Results   Component Value Date    MAAZJGYD36 239 11/11/2021       TSH   Date Value Ref Range Status   04/21/2022 1.360 0.450 - 4.500 uIU/mL Final   04/15/2019 1.030 0.270 - 4.200 mIU/mL Final       Lab Results   Component Value Date    CHOL 149 04/18/2019     Lab Results   Component Value Date    TRIG 205 (H) 03/25/2025     Lab Results   Component Value Date    HDL 36 (L) 03/25/2025     Lab Results   Component Value Date    LDL 94 03/25/2025     Lab Results   Component Value Date    VLDL 35 03/25/2025     Lab Results   Component Value Date    LDLHDL 2.84 04/18/2019         Procedures    Assessment & Plan   Problems Addressed this Visit       Mixed hyperlipidemia    Relevant Orders    Lipid Panel With / Chol / HDL Ratio    Comprehensive Metabolic Panel    Essential hypertension    Relevant Medications    losartan-hydrochlorothiazide (Hyzaar) 50-12.5 MG per tablet    Type 2 diabetes mellitus with circulatory disorder, without long-term current use of insulin - Primary    Relevant Orders    Hemoglobin A1c    Lipid Panel With / Chol / HDL Ratio    Comprehensive Metabolic Panel     Diagnoses         Codes Comments       Type 2 diabetes mellitus with other circulatory complication, without long-term current use of insulin    -  Primary ICD-10-CM: E11.59  ICD-9-CM: 250.70       Essential hypertension     ICD-10-CM: I10  ICD-9-CM: 401.9       Mixed hyperlipidemia     ICD-10-CM: E78.2  ICD-9-CM: 272.2           DM2.  Controlled.  Check A1c, CMP.  Contnue TLC.    HTN with orthostasis.  Uncontrolled.  Decrease losartan/hctz to 50/12.5 a day.    Hyperlipidmeia.  Uncontrolled.  Check FLP.  Contnue atorvastatin 40 a day.    Orders Placed This Encounter   Procedures    Hemoglobin A1c     Release to patient:   Routine Release [[id number]]    Lipid Panel With / Chol / HDL Ratio     Release to patient:   Routine Release [[id number]]    Comprehensive Metabolic Panel     Release to patient:   Routine Release [[id number]]       Current Outpatient Medications   Medication Sig Dispense Refill    albuterol sulfate HFA (Ventolin HFA) 108 (90 Base) MCG/ACT inhaler Inhale 2 puffs Every 4 (Four) Hours As Needed for Wheezing. 18 g 3    aspirin (aspirin) 81 MG EC tablet Take 1 tablet by mouth Daily.      atorvastatin (LIPITOR) 40 MG tablet Take 1 tablet by mouth Every Night. 90 tablet 3    cetirizine (ZyrTEC) 10 MG tablet Take 1 tablet by mouth Daily.      furosemide (LASIX) 20 MG tablet TAKE 1 TABLET BY MOUTH EVERY DAY AS NEEDED FOR chest congestion or swelling in legs. 30 tablet 4    latanoprost (XALATAN) 0.005 % ophthalmic solution instill 1 drop by ophthalmic route  every day into both eye(s) every day the evening      Leuprolide Mesylate, 6 Month, 42 MG prefilled syringe Inject  under the skin into the appropriate area as directed.      metoprolol succinate XL (TOPROL-XL) 25 MG 24 hr tablet Take 1 tablet by mouth Daily. 90 tablet 3    nitroglycerin (NITROSTAT) 0.4 MG SL tablet Place 1 tablet under the tongue Every 5 (Five) Minutes As Needed for Chest Pain (Chest Pain With Systolic Blood Pressure Greater Than 100). 30 tablet 12    pantoprazole  (PROTONIX) 40 MG EC tablet TAKE 1 TABLET BY MOUTH EVERY DAY 90 tablet 3    QUEtiapine (SEROquel) 100 MG tablet Take 1 tablet by mouth every night at bedtime. 90 tablet 3    venlafaxine XR (Effexor XR) 75 MG 24 hr capsule Take 1 capsule by mouth Daily. 90 capsule 2    warfarin (COUMADIN) 5 MG tablet Take 1 tablet by mouth Daily.      warfarin (Jantoven) 3 MG tablet TAKE 1 TABLET BY MOUTH EVERY DAY or as directed by MD depending on INR. 90 tablet 2    losartan-hydrochlorothiazide (Hyzaar) 50-12.5 MG per tablet Take 1 tablet by mouth Daily. 90 tablet 3     No current facility-administered medications for this visit.       Godfrey Shah had no medications administered during this visit.    Return in about 19 weeks (around 12/1/2025) for Medicare wellness and OV, 30 minutes.    There are no Patient Instructions on file for this visit.

## 2025-07-22 LAB
ALBUMIN SERPL-MCNC: 4.3 G/DL (ref 3.8–4.8)
ALP SERPL-CCNC: 132 IU/L (ref 44–121)
ALT SERPL-CCNC: 20 IU/L (ref 0–44)
AST SERPL-CCNC: 28 IU/L (ref 0–40)
BILIRUB SERPL-MCNC: 0.8 MG/DL (ref 0–1.2)
BUN SERPL-MCNC: 16 MG/DL (ref 8–27)
BUN/CREAT SERPL: 15 (ref 10–24)
CALCIUM SERPL-MCNC: 9.3 MG/DL (ref 8.6–10.2)
CHLORIDE SERPL-SCNC: 97 MMOL/L (ref 96–106)
CHOLEST SERPL-MCNC: 165 MG/DL (ref 100–199)
CHOLEST/HDLC SERPL: 4.5 RATIO (ref 0–5)
CO2 SERPL-SCNC: 24 MMOL/L (ref 20–29)
CREAT SERPL-MCNC: 1.06 MG/DL (ref 0.76–1.27)
EGFRCR SERPLBLD CKD-EPI 2021: 71 ML/MIN/1.73
GLOBULIN SER CALC-MCNC: 2.8 G/DL (ref 1.5–4.5)
GLUCOSE SERPL-MCNC: 108 MG/DL (ref 70–99)
HBA1C MFR BLD: 6.2 % (ref 4.8–5.6)
HDLC SERPL-MCNC: 37 MG/DL
LDLC SERPL CALC-MCNC: 90 MG/DL (ref 0–99)
POTASSIUM SERPL-SCNC: 3.9 MMOL/L (ref 3.5–5.2)
PROT SERPL-MCNC: 7.1 G/DL (ref 6–8.5)
SODIUM SERPL-SCNC: 139 MMOL/L (ref 134–144)
TRIGL SERPL-MCNC: 226 MG/DL (ref 0–149)
VLDLC SERPL CALC-MCNC: 38 MG/DL (ref 5–40)

## 2025-07-28 RX ORDER — METOPROLOL SUCCINATE 25 MG/1
25 TABLET, EXTENDED RELEASE ORAL DAILY
Qty: 90 TABLET | Refills: 3 | Status: SHIPPED | OUTPATIENT
Start: 2025-07-28

## 2025-08-03 ENCOUNTER — HOSPITAL ENCOUNTER (EMERGENCY)
Facility: HOSPITAL | Age: 80
Discharge: HOME | End: 2025-08-03
Attending: EMERGENCY MEDICINE | Admitting: EMERGENCY MEDICINE
Payer: MEDICARE

## 2025-08-03 ENCOUNTER — APPOINTMENT (OUTPATIENT)
Dept: GENERAL RADIOLOGY | Facility: HOSPITAL | Age: 80
End: 2025-08-03
Payer: MEDICARE

## 2025-08-03 VITALS
TEMPERATURE: 98.1 F | HEIGHT: 68 IN | BODY MASS INDEX: 31.25 KG/M2 | OXYGEN SATURATION: 93 % | HEART RATE: 98 BPM | RESPIRATION RATE: 16 BRPM | DIASTOLIC BLOOD PRESSURE: 76 MMHG | SYSTOLIC BLOOD PRESSURE: 139 MMHG

## 2025-08-03 DIAGNOSIS — S61.215A LACERATION OF LEFT RING FINGER WITHOUT FOREIGN BODY WITHOUT DAMAGE TO NAIL, INITIAL ENCOUNTER: Primary | ICD-10-CM

## 2025-08-03 PROCEDURE — 25010000002 LIDOCAINE 1 % SOLUTION: Performed by: PHYSICIAN ASSISTANT

## 2025-08-03 PROCEDURE — 99283 EMERGENCY DEPT VISIT LOW MDM: CPT

## 2025-08-03 PROCEDURE — 73130 X-RAY EXAM OF HAND: CPT

## 2025-08-03 RX ORDER — LIDOCAINE HYDROCHLORIDE 10 MG/ML
10 INJECTION, SOLUTION INFILTRATION; PERINEURAL ONCE
Status: COMPLETED | OUTPATIENT
Start: 2025-08-03 | End: 2025-08-03

## 2025-08-03 RX ADMIN — LIDOCAINE HYDROCHLORIDE 10 ML: 10 INJECTION, SOLUTION INFILTRATION; PERINEURAL at 20:31

## 2025-08-20 ENCOUNTER — OFFICE VISIT (OUTPATIENT)
Dept: FAMILY MEDICINE CLINIC | Facility: CLINIC | Age: 80
End: 2025-08-20
Payer: MEDICARE

## 2025-08-20 VITALS
BODY MASS INDEX: 29.67 KG/M2 | DIASTOLIC BLOOD PRESSURE: 70 MMHG | OXYGEN SATURATION: 94 % | SYSTOLIC BLOOD PRESSURE: 118 MMHG | HEART RATE: 75 BPM | TEMPERATURE: 98.3 F | WEIGHT: 195.8 LBS | HEIGHT: 68 IN

## 2025-08-20 DIAGNOSIS — Z48.02 VISIT FOR SUTURE REMOVAL: ICD-10-CM

## 2025-08-20 DIAGNOSIS — E78.2 MIXED HYPERLIPIDEMIA: Primary | ICD-10-CM

## 2025-08-20 DIAGNOSIS — I25.10 CORONARY ARTERY DISEASE INVOLVING NATIVE CORONARY ARTERY OF NATIVE HEART WITHOUT ANGINA PECTORIS: ICD-10-CM

## 2025-08-20 RX ORDER — ATORVASTATIN CALCIUM 80 MG/1
80 TABLET, FILM COATED ORAL NIGHTLY
Qty: 90 TABLET | Refills: 3 | Status: SHIPPED | OUTPATIENT
Start: 2025-08-20

## (undated) DEVICE — LOU PACE DEFIB: Brand: MEDLINE INDUSTRIES, INC.

## (undated) DEVICE — ADAPT CLN BIOGUARD AIR/H2O DISP

## (undated) DEVICE — LN SMPL CO2 SHTRM SD STREAM W/M LUER

## (undated) DEVICE — GLIDESHEATH SLENDER STAINLESS STEEL KIT: Brand: GLIDESHEATH SLENDER

## (undated) DEVICE — CANN O2 ETCO2 FITS ALL CONN CO2 SMPL A/ 7IN DISP LF

## (undated) DEVICE — PK CATH CARD 40

## (undated) DEVICE — KT MANIFLD CARDIAC

## (undated) DEVICE — CATH DIAG IMPULSE FL3.5 5F 100CM

## (undated) DEVICE — ELECTRD ECG CARBON/SNP RL FM A/ 5PK

## (undated) DEVICE — Device

## (undated) DEVICE — INTRO SHEATH PRELUDE SNAP .038 7F 13CM W/SDPRT

## (undated) DEVICE — INTRO SHEATH PRELUDE SNAP .038 6F 13CM W/SDPRT

## (undated) DEVICE — TUBING, SUCTION, 1/4" X 10', STRAIGHT: Brand: MEDLINE

## (undated) DEVICE — CATH DIAG IMPULSE PIG 5F 100CM

## (undated) DEVICE — GW INQWIRE FC PTFE J/3MM .035 180

## (undated) DEVICE — CATH DIAG IMPULSE FR4 5F 100CM

## (undated) DEVICE — GW EMR FIX EXCHG J STD .035 3MM 260CM

## (undated) DEVICE — SENSR O2 OXIMAX FNGR A/ 18IN NONSTR

## (undated) DEVICE — KT ORCA ORCAPOD DISP STRL

## (undated) DEVICE — PENCL E/S HNDSWCH ROCKR CB

## (undated) DEVICE — MASK,OXY,ADLT,NON-REB,SAFETY VENT,7,UC: Brand: MEDLINE